# Patient Record
Sex: FEMALE | Race: WHITE | NOT HISPANIC OR LATINO | Employment: FULL TIME | URBAN - NONMETROPOLITAN AREA
[De-identification: names, ages, dates, MRNs, and addresses within clinical notes are randomized per-mention and may not be internally consistent; named-entity substitution may affect disease eponyms.]

---

## 2017-10-05 ENCOUNTER — APPOINTMENT (OUTPATIENT)
Dept: CARDIOLOGY | Facility: HOSPITAL | Age: 37
End: 2017-10-05
Attending: INTERNAL MEDICINE

## 2017-10-05 ENCOUNTER — HOSPITAL ENCOUNTER (INPATIENT)
Facility: HOSPITAL | Age: 37
LOS: 2 days | Discharge: HOME | End: 2017-10-07
Attending: INTERNAL MEDICINE | Admitting: INTERNAL MEDICINE

## 2017-10-05 PROBLEM — I21.3 STEMI (ST ELEVATION MYOCARDIAL INFARCTION): Status: ACTIVE | Noted: 2017-10-05

## 2017-10-05 LAB
ACT BLD: 224 SECONDS (ref 82–152)
BH CV ECHO MEAS - AO MAX PG (FULL): 2 MMHG
BH CV ECHO MEAS - AO MAX PG: 6.4 MMHG
BH CV ECHO MEAS - AO MEAN PG (FULL): 1 MMHG
BH CV ECHO MEAS - AO MEAN PG: 3 MMHG
BH CV ECHO MEAS - AO ROOT AREA (BSA CORRECTED): 1.4
BH CV ECHO MEAS - AO ROOT AREA: 5.7 CM^2
BH CV ECHO MEAS - AO ROOT DIAM: 2.7 CM
BH CV ECHO MEAS - AO V2 MAX: 126 CM/SEC
BH CV ECHO MEAS - AO V2 MEAN: 70.3 CM/SEC
BH CV ECHO MEAS - AO V2 VTI: 29 CM
BH CV ECHO MEAS - AVA(I,A): 2.9 CM^2
BH CV ECHO MEAS - AVA(I,D): 2.9 CM^2
BH CV ECHO MEAS - AVA(V,A): 2.9 CM^2
BH CV ECHO MEAS - AVA(V,D): 2.9 CM^2
BH CV ECHO MEAS - BSA(HAYCOCK): 2.1 M^2
BH CV ECHO MEAS - BSA: 2 M^2
BH CV ECHO MEAS - BZI_BMI: 33.3 KILOGRAMS/M^2
BH CV ECHO MEAS - BZI_METRIC_HEIGHT: 165.1 CM
BH CV ECHO MEAS - BZI_METRIC_WEIGHT: 90.7 KG
BH CV ECHO MEAS - CONTRAST EF 4CH: 67.5 ML/M^2
BH CV ECHO MEAS - EDV(CUBED): 161 ML
BH CV ECHO MEAS - EDV(MOD-SP4): 139 ML
BH CV ECHO MEAS - EDV(TEICH): 143.7 ML
BH CV ECHO MEAS - EF(CUBED): 74.3 %
BH CV ECHO MEAS - EF(TEICH): 65.6 %
BH CV ECHO MEAS - ESV(CUBED): 41.4 ML
BH CV ECHO MEAS - ESV(MOD-SP4): 45.2 ML
BH CV ECHO MEAS - ESV(TEICH): 49.5 ML
BH CV ECHO MEAS - FS: 36.4 %
BH CV ECHO MEAS - IVS/LVPW: 1
BH CV ECHO MEAS - IVSD: 1.1 CM
BH CV ECHO MEAS - LA DIMENSION: 3.5 CM
BH CV ECHO MEAS - LA/AO: 1.3
BH CV ECHO MEAS - LAT PEAK E' VEL: 9.3 CM/SEC
BH CV ECHO MEAS - LV DIASTOLIC VOL/BSA (35-75): 70.3 ML/M^2
BH CV ECHO MEAS - LV MASS(C)D: 233.3 GRAMS
BH CV ECHO MEAS - LV MASS(C)DI: 118 GRAMS/M^2
BH CV ECHO MEAS - LV MAX PG: 4.3 MMHG
BH CV ECHO MEAS - LV MEAN PG: 2 MMHG
BH CV ECHO MEAS - LV SYSTOLIC VOL/BSA (12-30): 22.8 ML/M^2
BH CV ECHO MEAS - LV V1 MAX: 104 CM/SEC
BH CV ECHO MEAS - LV V1 MEAN: 57.4 CM/SEC
BH CV ECHO MEAS - LV V1 VTI: 24.4 CM
BH CV ECHO MEAS - LVIDD: 5.4 CM
BH CV ECHO MEAS - LVIDS: 3.5 CM
BH CV ECHO MEAS - LVLD AP4: 8 CM
BH CV ECHO MEAS - LVLS AP4: 7 CM
BH CV ECHO MEAS - LVOT AREA (M): 3.5 CM^2
BH CV ECHO MEAS - LVOT AREA: 3.5 CM^2
BH CV ECHO MEAS - LVOT DIAM: 2.1 CM
BH CV ECHO MEAS - LVPWD: 1.1 CM
BH CV ECHO MEAS - MED PEAK E' VEL: 8.38 CM/SEC
BH CV ECHO MEAS - MV A MAX VEL: 76.2 CM/SEC
BH CV ECHO MEAS - MV DEC TIME: 0.23 SEC
BH CV ECHO MEAS - MV E MAX VEL: 91.2 CM/SEC
BH CV ECHO MEAS - MV E/A: 1.2
BH CV ECHO MEAS - RAP SYSTOLE: 5 MMHG
BH CV ECHO MEAS - RVDD: 4.5 CM
BH CV ECHO MEAS - RVSP: 21.8 MMHG
BH CV ECHO MEAS - SI(AO): 83.9 ML/M^2
BH CV ECHO MEAS - SI(CUBED): 60.4 ML/M^2
BH CV ECHO MEAS - SI(LVOT): 42.7 ML/M^2
BH CV ECHO MEAS - SI(MOD-SP4): 47.4 ML/M^2
BH CV ECHO MEAS - SI(TEICH): 47.7 ML/M^2
BH CV ECHO MEAS - SV(AO): 166 ML
BH CV ECHO MEAS - SV(CUBED): 119.6 ML
BH CV ECHO MEAS - SV(LVOT): 84.5 ML
BH CV ECHO MEAS - SV(MOD-SP4): 93.8 ML
BH CV ECHO MEAS - SV(TEICH): 94.3 ML
BH CV ECHO MEAS - TR MAX VEL: 205 CM/SEC
E/E' RATIO: 10.9
LEFT ATRIUM VOLUME INDEX: 17.2 ML/M2
LEFT ATRIUM VOLUME: 34 CM3

## 2017-10-05 PROCEDURE — 25010000002 MIDAZOLAM PER 1 MG: Performed by: INTERNAL MEDICINE

## 2017-10-05 PROCEDURE — 93010 ELECTROCARDIOGRAM REPORT: CPT | Performed by: INTERNAL MEDICINE

## 2017-10-05 PROCEDURE — 4A023N7 MEASUREMENT OF CARDIAC SAMPLING AND PRESSURE, LEFT HEART, PERCUTANEOUS APPROACH: ICD-10-PCS | Performed by: INTERNAL MEDICINE

## 2017-10-05 PROCEDURE — 99152 MOD SED SAME PHYS/QHP 5/>YRS: CPT | Performed by: INTERNAL MEDICINE

## 2017-10-05 PROCEDURE — 93005 ELECTROCARDIOGRAM TRACING: CPT | Performed by: INTERNAL MEDICINE

## 2017-10-05 PROCEDURE — 0 IOPAMIDOL PER 1 ML: Performed by: INTERNAL MEDICINE

## 2017-10-05 PROCEDURE — 93306 TTE W/DOPPLER COMPLETE: CPT

## 2017-10-05 PROCEDURE — C1725 CATH, TRANSLUMIN NON-LASER: HCPCS | Performed by: INTERNAL MEDICINE

## 2017-10-05 PROCEDURE — C9606 PERC D-E COR REVASC W AMI S: HCPCS | Performed by: INTERNAL MEDICINE

## 2017-10-05 PROCEDURE — 027034Z DILATION OF CORONARY ARTERY, ONE ARTERY WITH DRUG-ELUTING INTRALUMINAL DEVICE, PERCUTANEOUS APPROACH: ICD-10-PCS | Performed by: INTERNAL MEDICINE

## 2017-10-05 PROCEDURE — 99223 1ST HOSP IP/OBS HIGH 75: CPT | Performed by: INTERNAL MEDICINE

## 2017-10-05 PROCEDURE — 25010000002 HEPARIN (PORCINE) PER 1000 UNITS: Performed by: INTERNAL MEDICINE

## 2017-10-05 PROCEDURE — 25010000002 FENTANYL CITRATE (PF) 100 MCG/2ML SOLUTION: Performed by: INTERNAL MEDICINE

## 2017-10-05 PROCEDURE — 93454 CORONARY ARTERY ANGIO S&I: CPT | Performed by: INTERNAL MEDICINE

## 2017-10-05 PROCEDURE — 92941 PRQ TRLML REVSC TOT OCCL AMI: CPT | Performed by: INTERNAL MEDICINE

## 2017-10-05 PROCEDURE — C1769 GUIDE WIRE: HCPCS | Performed by: INTERNAL MEDICINE

## 2017-10-05 PROCEDURE — B2151ZZ FLUOROSCOPY OF LEFT HEART USING LOW OSMOLAR CONTRAST: ICD-10-PCS | Performed by: INTERNAL MEDICINE

## 2017-10-05 PROCEDURE — 85347 COAGULATION TIME ACTIVATED: CPT

## 2017-10-05 PROCEDURE — 25010000002 DIPHENHYDRAMINE PER 50 MG: Performed by: INTERNAL MEDICINE

## 2017-10-05 PROCEDURE — C1894 INTRO/SHEATH, NON-LASER: HCPCS | Performed by: INTERNAL MEDICINE

## 2017-10-05 PROCEDURE — C1887 CATHETER, GUIDING: HCPCS | Performed by: INTERNAL MEDICINE

## 2017-10-05 PROCEDURE — C1874 STENT, COATED/COV W/DEL SYS: HCPCS | Performed by: INTERNAL MEDICINE

## 2017-10-05 PROCEDURE — B2111ZZ FLUOROSCOPY OF MULTIPLE CORONARY ARTERIES USING LOW OSMOLAR CONTRAST: ICD-10-PCS | Performed by: INTERNAL MEDICINE

## 2017-10-05 PROCEDURE — 99201: CPT

## 2017-10-05 PROCEDURE — 25010000002 PERFLUTREN 6.52 MG/ML SUSPENSION: Performed by: INTERNAL MEDICINE

## 2017-10-05 PROCEDURE — 93306 TTE W/DOPPLER COMPLETE: CPT | Performed by: INTERNAL MEDICINE

## 2017-10-05 DEVICE — XIENCE ALPINE EVEROLIMUS ELUTING CORONARY STENT SYSTEM 3.50 MM X 23 MM / RAPID-EXCHANGE
Type: IMPLANTABLE DEVICE | Status: FUNCTIONAL
Brand: XIENCE ALPINE

## 2017-10-05 RX ORDER — HEPARIN SODIUM 1000 [USP'U]/ML
INJECTION, SOLUTION INTRAVENOUS; SUBCUTANEOUS AS NEEDED
Status: DISCONTINUED | OUTPATIENT
Start: 2017-10-05 | End: 2017-10-05 | Stop reason: HOSPADM

## 2017-10-05 RX ORDER — NICOTINE 21 MG/24HR
1 PATCH, TRANSDERMAL 24 HOURS TRANSDERMAL EVERY 24 HOURS
Status: DISCONTINUED | OUTPATIENT
Start: 2017-10-05 | End: 2017-10-07 | Stop reason: HOSPADM

## 2017-10-05 RX ORDER — ATORVASTATIN CALCIUM 40 MG/1
80 TABLET, FILM COATED ORAL NIGHTLY
Status: DISCONTINUED | OUTPATIENT
Start: 2017-10-05 | End: 2017-10-07 | Stop reason: HOSPADM

## 2017-10-05 RX ORDER — NITROGLYCERIN 5 MG/ML
INJECTION, SOLUTION INTRAVENOUS AS NEEDED
Status: DISCONTINUED | OUTPATIENT
Start: 2017-10-05 | End: 2017-10-05 | Stop reason: HOSPADM

## 2017-10-05 RX ORDER — ASPIRIN 81 MG/1
81 TABLET ORAL DAILY
Status: DISCONTINUED | OUTPATIENT
Start: 2017-10-05 | End: 2017-10-07 | Stop reason: HOSPADM

## 2017-10-05 RX ORDER — SODIUM CHLORIDE 0.9 % (FLUSH) 0.9 %
1-10 SYRINGE (ML) INJECTION AS NEEDED
Status: DISCONTINUED | OUTPATIENT
Start: 2017-10-05 | End: 2017-10-07 | Stop reason: HOSPADM

## 2017-10-05 RX ORDER — DIPHENHYDRAMINE HYDROCHLORIDE 50 MG/ML
INJECTION INTRAMUSCULAR; INTRAVENOUS AS NEEDED
Status: DISCONTINUED | OUTPATIENT
Start: 2017-10-05 | End: 2017-10-05 | Stop reason: HOSPADM

## 2017-10-05 RX ORDER — ACETAMINOPHEN 325 MG/1
650 TABLET ORAL EVERY 4 HOURS PRN
Status: DISCONTINUED | OUTPATIENT
Start: 2017-10-05 | End: 2017-10-05 | Stop reason: SDUPTHER

## 2017-10-05 RX ORDER — SODIUM CHLORIDE 9 MG/ML
100 INJECTION, SOLUTION INTRAVENOUS CONTINUOUS
Status: DISCONTINUED | OUTPATIENT
Start: 2017-10-05 | End: 2017-10-07 | Stop reason: HOSPADM

## 2017-10-05 RX ORDER — ONDANSETRON 2 MG/ML
4 INJECTION INTRAMUSCULAR; INTRAVENOUS EVERY 6 HOURS PRN
Status: DISCONTINUED | OUTPATIENT
Start: 2017-10-05 | End: 2017-10-07 | Stop reason: HOSPADM

## 2017-10-05 RX ORDER — LIDOCAINE HYDROCHLORIDE 20 MG/ML
INJECTION, SOLUTION INFILTRATION; PERINEURAL AS NEEDED
Status: DISCONTINUED | OUTPATIENT
Start: 2017-10-05 | End: 2017-10-05 | Stop reason: HOSPADM

## 2017-10-05 RX ORDER — MIDAZOLAM HYDROCHLORIDE 1 MG/ML
INJECTION INTRAMUSCULAR; INTRAVENOUS AS NEEDED
Status: DISCONTINUED | OUTPATIENT
Start: 2017-10-05 | End: 2017-10-05 | Stop reason: HOSPADM

## 2017-10-05 RX ORDER — NITROGLYCERIN 20 MG/100ML
INJECTION INTRAVENOUS CONTINUOUS PRN
Status: DISCONTINUED | OUTPATIENT
Start: 2017-10-05 | End: 2017-10-05 | Stop reason: HOSPADM

## 2017-10-05 RX ORDER — NITROGLYCERIN 0.4 MG/1
0.4 TABLET SUBLINGUAL
Status: DISCONTINUED | OUTPATIENT
Start: 2017-10-05 | End: 2017-10-07 | Stop reason: HOSPADM

## 2017-10-05 RX ORDER — ACETAMINOPHEN 325 MG/1
650 TABLET ORAL EVERY 4 HOURS PRN
Status: DISCONTINUED | OUTPATIENT
Start: 2017-10-05 | End: 2017-10-07 | Stop reason: HOSPADM

## 2017-10-05 RX ORDER — FENTANYL CITRATE 50 UG/ML
INJECTION, SOLUTION INTRAMUSCULAR; INTRAVENOUS AS NEEDED
Status: DISCONTINUED | OUTPATIENT
Start: 2017-10-05 | End: 2017-10-05 | Stop reason: HOSPADM

## 2017-10-05 RX ADMIN — SODIUM CHLORIDE 100 ML/HR: 9 INJECTION, SOLUTION INTRAVENOUS at 12:51

## 2017-10-05 RX ADMIN — PERFLUTREN 8.48 MG: 6.52 INJECTION, SUSPENSION INTRAVENOUS at 14:04

## 2017-10-05 RX ADMIN — ASPIRIN 81 MG: 81 TABLET ORAL at 12:49

## 2017-10-05 RX ADMIN — TICAGRELOR 90 MG: 90 TABLET ORAL at 20:16

## 2017-10-05 RX ADMIN — METOPROLOL TARTRATE 25 MG: 25 TABLET, FILM COATED ORAL at 20:16

## 2017-10-05 RX ADMIN — ATORVASTATIN CALCIUM 80 MG: 40 TABLET, FILM COATED ORAL at 20:16

## 2017-10-05 RX ADMIN — METOPROLOL TARTRATE 25 MG: 25 TABLET, FILM COATED ORAL at 12:49

## 2017-10-05 RX ADMIN — NICOTINE 1 PATCH: 21 PATCH, EXTENDED RELEASE TRANSDERMAL at 12:49

## 2017-10-06 LAB
ANION GAP SERPL CALCULATED.3IONS-SCNC: 11 MMOL/L (ref 4–13)
ARTICHOKE IGE QN: 202 MG/DL (ref 0–99)
BUN BLD-MCNC: 8 MG/DL (ref 5–21)
BUN/CREAT SERPL: 11.8 (ref 7–25)
CALCIUM SPEC-SCNC: 9 MG/DL (ref 8.4–10.4)
CHLORIDE SERPL-SCNC: 104 MMOL/L (ref 98–110)
CHOLEST SERPL-MCNC: 248 MG/DL (ref 130–200)
CO2 SERPL-SCNC: 25 MMOL/L (ref 24–31)
CREAT BLD-MCNC: 0.68 MG/DL (ref 0.5–1.4)
DEPRECATED RDW RBC AUTO: 44.8 FL (ref 40–54)
ERYTHROCYTE [DISTWIDTH] IN BLOOD BY AUTOMATED COUNT: 13.6 % (ref 12–15)
GFR SERPL CREATININE-BSD FRML MDRD: 97 ML/MIN/1.73
GLUCOSE BLD-MCNC: 91 MG/DL (ref 70–100)
HBA1C MFR BLD: 5.1 %
HCT VFR BLD AUTO: 43.6 % (ref 37–47)
HDLC SERPL-MCNC: 27 MG/DL
HGB BLD-MCNC: 15.2 G/DL (ref 12–16)
LDLC/HDLC SERPL: 6.16 {RATIO}
MAGNESIUM SERPL-MCNC: 1.8 MG/DL (ref 1.4–2.2)
MCH RBC QN AUTO: 31.7 PG (ref 28–32)
MCHC RBC AUTO-ENTMCNC: 34.9 G/DL (ref 33–36)
MCV RBC AUTO: 90.8 FL (ref 82–98)
PLATELET # BLD AUTO: 281 10*3/MM3 (ref 130–400)
PMV BLD AUTO: 9.7 FL (ref 6–12)
POTASSIUM BLD-SCNC: 3.7 MMOL/L (ref 3.5–5.3)
RBC # BLD AUTO: 4.8 10*6/MM3 (ref 4.2–5.4)
SODIUM BLD-SCNC: 140 MMOL/L (ref 135–145)
TRIGL SERPL-MCNC: 273 MG/DL (ref 0–149)
WBC NRBC COR # BLD: 17.7 10*3/MM3 (ref 4.8–10.8)

## 2017-10-06 PROCEDURE — 94799 UNLISTED PULMONARY SVC/PX: CPT

## 2017-10-06 PROCEDURE — 85027 COMPLETE CBC AUTOMATED: CPT | Performed by: INTERNAL MEDICINE

## 2017-10-06 PROCEDURE — 83735 ASSAY OF MAGNESIUM: CPT | Performed by: INTERNAL MEDICINE

## 2017-10-06 PROCEDURE — 99232 SBSQ HOSP IP/OBS MODERATE 35: CPT | Performed by: INTERNAL MEDICINE

## 2017-10-06 PROCEDURE — 83036 HEMOGLOBIN GLYCOSYLATED A1C: CPT | Performed by: INTERNAL MEDICINE

## 2017-10-06 PROCEDURE — 80061 LIPID PANEL: CPT | Performed by: INTERNAL MEDICINE

## 2017-10-06 PROCEDURE — 93010 ELECTROCARDIOGRAM REPORT: CPT | Performed by: INTERNAL MEDICINE

## 2017-10-06 PROCEDURE — 25010000002 ENOXAPARIN PER 10 MG: Performed by: INTERNAL MEDICINE

## 2017-10-06 PROCEDURE — 93005 ELECTROCARDIOGRAM TRACING: CPT | Performed by: INTERNAL MEDICINE

## 2017-10-06 PROCEDURE — 80048 BASIC METABOLIC PNL TOTAL CA: CPT | Performed by: INTERNAL MEDICINE

## 2017-10-06 RX ADMIN — ATORVASTATIN CALCIUM 80 MG: 40 TABLET, FILM COATED ORAL at 21:19

## 2017-10-06 RX ADMIN — NICOTINE 1 PATCH: 21 PATCH, EXTENDED RELEASE TRANSDERMAL at 12:26

## 2017-10-06 RX ADMIN — METOPROLOL TARTRATE 25 MG: 25 TABLET, FILM COATED ORAL at 21:19

## 2017-10-06 RX ADMIN — TICAGRELOR 90 MG: 90 TABLET ORAL at 08:14

## 2017-10-06 RX ADMIN — ASPIRIN 81 MG: 81 TABLET ORAL at 08:14

## 2017-10-06 RX ADMIN — TICAGRELOR 90 MG: 90 TABLET ORAL at 21:19

## 2017-10-06 RX ADMIN — METOPROLOL TARTRATE 25 MG: 25 TABLET, FILM COATED ORAL at 08:14

## 2017-10-06 RX ADMIN — ENOXAPARIN SODIUM 40 MG: 40 INJECTION SUBCUTANEOUS at 08:14

## 2017-10-07 VITALS
BODY MASS INDEX: 33.22 KG/M2 | SYSTOLIC BLOOD PRESSURE: 121 MMHG | TEMPERATURE: 97 F | HEIGHT: 65 IN | OXYGEN SATURATION: 98 % | HEART RATE: 78 BPM | RESPIRATION RATE: 18 BRPM | DIASTOLIC BLOOD PRESSURE: 84 MMHG | WEIGHT: 199.38 LBS

## 2017-10-07 PROCEDURE — 25010000002 ENOXAPARIN PER 10 MG: Performed by: INTERNAL MEDICINE

## 2017-10-07 PROCEDURE — 99239 HOSP IP/OBS DSCHRG MGMT >30: CPT | Performed by: INTERNAL MEDICINE

## 2017-10-07 RX ORDER — ASPIRIN 81 MG/1
81 TABLET ORAL DAILY
Qty: 30 TABLET | Refills: 11
Start: 2017-10-08

## 2017-10-07 RX ORDER — ATORVASTATIN CALCIUM 80 MG/1
80 TABLET, FILM COATED ORAL NIGHTLY
Qty: 30 TABLET | Refills: 11 | Status: SHIPPED | OUTPATIENT
Start: 2017-10-07 | End: 2018-10-01 | Stop reason: SDUPTHER

## 2017-10-07 RX ORDER — NITROGLYCERIN 0.4 MG/1
0.4 TABLET SUBLINGUAL
Qty: 30 TABLET | Refills: 12 | Status: SHIPPED | OUTPATIENT
Start: 2017-10-07 | End: 2021-05-19 | Stop reason: SDUPTHER

## 2017-10-07 RX ADMIN — ENOXAPARIN SODIUM 40 MG: 40 INJECTION SUBCUTANEOUS at 08:20

## 2017-10-07 RX ADMIN — TICAGRELOR 90 MG: 90 TABLET ORAL at 08:20

## 2017-10-07 RX ADMIN — ASPIRIN 81 MG: 81 TABLET ORAL at 08:20

## 2017-10-07 RX ADMIN — METOPROLOL TARTRATE 25 MG: 25 TABLET, FILM COATED ORAL at 10:03

## 2017-10-11 ENCOUNTER — TELEPHONE (OUTPATIENT)
Dept: CARDIAC REHAB | Facility: HOSPITAL | Age: 37
End: 2017-10-11

## 2017-11-09 ENCOUNTER — OFFICE VISIT (OUTPATIENT)
Dept: CARDIOLOGY | Facility: CLINIC | Age: 37
End: 2017-11-09

## 2017-11-09 VITALS
BODY MASS INDEX: 33.82 KG/M2 | HEART RATE: 82 BPM | DIASTOLIC BLOOD PRESSURE: 60 MMHG | WEIGHT: 203 LBS | HEIGHT: 65 IN | SYSTOLIC BLOOD PRESSURE: 110 MMHG | OXYGEN SATURATION: 99 %

## 2017-11-09 DIAGNOSIS — E78.2 MIXED HYPERLIPIDEMIA: ICD-10-CM

## 2017-11-09 DIAGNOSIS — Z72.0 TOBACCO ABUSE: ICD-10-CM

## 2017-11-09 DIAGNOSIS — I25.10 CORONARY ARTERY DISEASE INVOLVING NATIVE CORONARY ARTERY OF NATIVE HEART WITHOUT ANGINA PECTORIS: Primary | ICD-10-CM

## 2017-11-09 PROBLEM — I21.3 STEMI (ST ELEVATION MYOCARDIAL INFARCTION) (HCC): Status: RESOLVED | Noted: 2017-10-05 | Resolved: 2017-11-09

## 2017-11-09 PROBLEM — E78.5 HYPERLIPIDEMIA: Status: ACTIVE | Noted: 2017-11-09

## 2017-11-09 PROCEDURE — 99214 OFFICE O/P EST MOD 30 MIN: CPT | Performed by: INTERNAL MEDICINE

## 2017-11-09 PROCEDURE — 93000 ELECTROCARDIOGRAM COMPLETE: CPT | Performed by: INTERNAL MEDICINE

## 2017-11-09 NOTE — PROGRESS NOTES
Subjective:     Encounter Date:11/09/2017      Patient ID: Leidy Lerma is a 37 y.o. female with coronary artery disease, status pot recent RCA PCI at time of STEMI, hyperlipidemia, tobacco abuse, here for follow-up.    Chief Complaint: Hospital follow-up    Coronary Artery Disease   Presents for follow-up visit. Pertinent negatives include no chest pain, chest pressure, chest tightness, dizziness, leg swelling, muscle weakness, palpitations, shortness of breath or weight gain. Risk factors include hyperlipidemia and obesity. The symptoms have been stable. Compliance with diet is good. Compliance with exercise is good. Compliance with medications is good.   Hyperlipidemia   This is a new (newly diagnosed at hospital visit) problem. The current episode started more than 1 month ago. The problem is uncontrolled. Recent lipid tests were reviewed and are high. Exacerbating diseases include obesity. Pertinent negatives include no chest pain, focal sensory loss, focal weakness, leg pain, myalgias or shortness of breath. Current antihyperlipidemic treatment includes statins. There are no compliance problems.  Risk factors for coronary artery disease include dyslipidemia and obesity.   Nicotine Dependence   Presents for follow-up visit. Symptoms are negative for sore throat. Her urge triggers include company of smokers. The symptoms have been stable. She smokes < 1/2 a pack of cigarettes per day. Compliance with prior treatments has been variable.     The patient has been doing well since being out of the hospital.  No recurrent chest pain, and she is participating in cardiac rehab.  No significant SOB, ARMENTA.  No orthopnea, PND, edema.  She has cut back on both tobacco and alcohol but is still smoking a few cigarettes per day.  She has also noted that, due to her decrease in tobacco, she has noted some anxiety.  No problems with medications.    Current Outpatient Prescriptions:   •  aspirin 81 MG EC tablet, Take 1  tablet by mouth Daily., Disp: 30 tablet, Rfl: 11  •  atorvastatin (LIPITOR) 80 MG tablet, Take 1 tablet by mouth Every Night., Disp: 30 tablet, Rfl: 11  •  metoprolol tartrate (LOPRESSOR) 25 MG tablet, Take 1 tablet by mouth Every 12 (Twelve) Hours., Disp: 60 tablet, Rfl: 11  •  nitroglycerin (NITROSTAT) 0.4 MG SL tablet, Place 1 tablet under the tongue Every 5 (Five) Minutes As Needed for Chest Pain. Take no more than 3 doses in 15 minutes., Disp: 30 tablet, Rfl: 12  •  ticagrelor (BRILINTA) 90 MG tablet tablet, Take 1 tablet by mouth Every 12 (Twelve) Hours., Disp: 60 tablet, Rfl: 11    Allergies   Allergen Reactions   • Penicillins Hives     Social History   Substance Use Topics   • Smoking status: Current Every Day Smoker     Packs/day: 1.00     Types: Cigarettes   • Smokeless tobacco: Never Used   • Alcohol use Yes      Comment: 1-2 drinks a week of liquor     Review of Systems   Constitution: Positive for malaise/fatigue. Negative for chills, fever, weakness, night sweats, weight gain and weight loss.   HENT: Negative for congestion and sore throat.    Cardiovascular: Negative for chest pain, claudication, dyspnea on exertion, irregular heartbeat, leg swelling, orthopnea, palpitations, paroxysmal nocturnal dyspnea and syncope.   Respiratory: Negative for chest tightness, cough, hemoptysis, shortness of breath and wheezing.    Endocrine: Negative for cold intolerance, heat intolerance, polydipsia and polyuria.   Hematologic/Lymphatic: Negative for bleeding problem. Does not bruise/bleed easily.   Musculoskeletal: Negative for muscle weakness and myalgias.   Gastrointestinal: Negative for abdominal pain, hematemesis, hematochezia, melena, nausea and vomiting.   Genitourinary: Negative for bladder incontinence, dysuria and hematuria.   Neurological: Negative for dizziness, focal weakness, headaches, loss of balance, numbness, paresthesias and seizures.   Psychiatric/Behavioral: The patient is nervous/anxious.   "      ECG 12 Lead  Date/Time: 11/9/2017 11:22 AM  Performed by: YAHAIRA JULIO  Authorized by: YAHAIRA JULIO   Comparison: compared with previous ECG from 10/6/2017  Similar to previous ECG  Rhythm: sinus rhythm  Rate: normal  BPM: 77  Conduction: conduction normal  QRS axis: normal  Clinical impression: abnormal ECG  Comments: NSTT inferior             Objective:     Physical Exam   Constitutional: She is oriented to person, place, and time. She appears well-developed and well-nourished. No distress.   HENT:   Head: Normocephalic and atraumatic.   Mouth/Throat: Oropharynx is clear and moist.   Eyes: EOM are normal. Pupils are equal, round, and reactive to light.   Neck: Normal range of motion. Neck supple. No JVD present. No thyromegaly present.   Cardiovascular: Normal rate, regular rhythm, S1 normal, S2 normal, normal heart sounds and intact distal pulses.  Exam reveals no gallop and no friction rub.    No murmur heard.  Pulmonary/Chest: Effort normal and breath sounds normal.   Abdominal: Soft. Bowel sounds are normal. She exhibits no distension. There is no tenderness.   Musculoskeletal: Normal range of motion. She exhibits no edema.   Neurological: She is alert and oriented to person, place, and time. No cranial nerve deficit.   Skin: Skin is warm and dry. No rash noted. No cyanosis or erythema. Nails show no clubbing.   Psychiatric: She has a normal mood and affect.   Vitals reviewed.    /60 (BP Location: Left arm, Patient Position: Sitting)  Pulse 82  Ht 65\" (165.1 cm)  Wt 203 lb (92.1 kg)  SpO2 99%  BMI 33.78 kg/m2    Data/Lab Review:     Echocardiogram on 10/5/17:  · Left ventricular systolic function is normal. Estimated EF appears to be in the range of 56 - 60%.  · The following left ventricular wall segments are hypokinetic: basal inferior.  · Left ventricular wall thickness is consistent with borderline concentric hypertrophy.  · Trace tricuspid valve regurgitation is " present. Estimated right ventricular systolic pressure from tricuspid regurgitation is normal (<35 mmHg).    Cardiac Cath 10/5/2017:    Selective Coronary Angiography:     Left Main Coronary Artery: The left main coronary artery arises from the left coronary cusp and bifurcates into the LAD and left circumflex arteries. Luminal irregularities are present, but no significant disease.      Left Anterior Descending Artery: The left anterior descending artery arises from the distal left main coronary artery and supplies one significant diagonal branch and a second very small diagonal branch and the left anterior descending artery terminates by wrapping the apex.  There is mild disease throughout the course of the left anterior descending artery with irregularities up to 30% or so.  The diagonal branch also has mild to moderate disease but no significant obstructive disease identified throughout the LAD of the diagonal branches..       Left Circumflex: The left circumflex artery arises from the distal left main artery and supplies essentially 2 obtuse marginals.  The first obtuse marginal has mild diffuse disease throughout the course the vessel.  The second obtuse marginal essentially terminates as 2 terminal branches each of which have mild irregularities.  The circumflex has no significant obstructive disease but irregularities up to 30%.      Right Coronary Artery: The right coronary artery arises from the right coronary cusp and is dominant for the posterior circulation.  On the initial angiograms, the right coronary artery is open but does have a subtotal occlusion in the midportion the vessel just at the takeoff of a right ventricular marginal branch.  The proximal portion the vessels diffusely diseased and then there is an ectatic portion just proximal to the subtotally occluded segment.  After this, there are 2 right ventricular marginal branches present.  Distally, the right coronary artery provides flow to a  "small posterior descending artery multiple posterolateral branches.  The entire vessel has diffuse disease throughout its course.      Percutaneous Coronary Intervention to the mid right coronary artery:      Guide Catheter: 6 [place] JR4  Anticoagulation: Unfractionated heparin  Wire(s): 0.014\" Prowater     A 0.014 inch Prowater wire was used to cross the stenosis in the mid right coronary artery and advanced distally in a stable position.  The lesion was then dilated with a 2.0 x 12 mm balloon.  After this, the lesion was stented with a Xience 3.5 x 23 mm drug-eluting stent.  After this, the proximal portion was postdilated with a noncompliant 4.0 x 12 mm balloon.  Follow-up angiography revealed no residual stenosis, BRAN-3 flow down the right coronary artery and preserved flow in the right ventricular marginal branches.  There is no evidence of dissection, perforation, distal embolization.  The proximal portion the vessel remains diffusely diseased and was intentionally left untreated.  The result at the stent site is thought to be a suitable angiographic result.    Lipid Panel 10/6/2017:    Total Cholesterol 130 - 200 mg/dL 248 (H)   Triglycerides 0 - 149 mg/dL 273 (H)   HDL Cholesterol >=50 mg/dL 27 (L)   LDL Cholesterol  0 - 99 mg/dL 202 (H)         Assessment:          Diagnosis Plan   1. Coronary artery disease involving native coronary artery of native heart without angina pectoris  ECG 12 Lead   2. Mixed hyperlipidemia     3. Tobacco abuse          Plan:       1. Coronary artery disease:  Clinically stable at this time after recent hospitalization.  No problems with her medications.  Continue aspirin and Brilinta (both, uninterrupted, for 12 months).  Continue other medications as well.    2. Hyperlipidemia:  Will plan to see back in 6 months and repeat lipid panel at that time as her LDL, while  Not on statin therapy, was 202.  Continue high dose statin therapy.    3. Tobacco abuse:  Counseled for 5 " minutes today.  She knows that she needs to quit and has cut back and will continue efforts at complete cessation.    Follow-up: 6 months unless needed sooner.    EMR Dragon/Transcription disclaimer: Much of this encounter note is an electronic transcription/translation of spoken language to printed text. The electronic translation of spoken language may permit erroneous, or at times, nonsensical words or phrases to be inadvertently transcribed; although I have reviewed the note for such errors, some may still exist.

## 2018-05-09 ENCOUNTER — OFFICE VISIT (OUTPATIENT)
Dept: CARDIOLOGY | Facility: CLINIC | Age: 38
End: 2018-05-09

## 2018-05-09 VITALS
OXYGEN SATURATION: 99 % | WEIGHT: 215 LBS | HEIGHT: 65 IN | DIASTOLIC BLOOD PRESSURE: 70 MMHG | HEART RATE: 87 BPM | BODY MASS INDEX: 35.82 KG/M2 | SYSTOLIC BLOOD PRESSURE: 118 MMHG

## 2018-05-09 DIAGNOSIS — Z72.0 TOBACCO ABUSE: ICD-10-CM

## 2018-05-09 DIAGNOSIS — E78.2 MIXED HYPERLIPIDEMIA: ICD-10-CM

## 2018-05-09 DIAGNOSIS — I25.10 CORONARY ARTERY DISEASE INVOLVING NATIVE CORONARY ARTERY OF NATIVE HEART WITHOUT ANGINA PECTORIS: Primary | ICD-10-CM

## 2018-05-09 PROCEDURE — 99406 BEHAV CHNG SMOKING 3-10 MIN: CPT | Performed by: INTERNAL MEDICINE

## 2018-05-09 PROCEDURE — 99214 OFFICE O/P EST MOD 30 MIN: CPT | Performed by: INTERNAL MEDICINE

## 2018-05-09 PROCEDURE — 93000 ELECTROCARDIOGRAM COMPLETE: CPT | Performed by: INTERNAL MEDICINE

## 2018-05-09 RX ORDER — ERGOCALCIFEROL 1.25 MG/1
50000 CAPSULE ORAL WEEKLY
COMMUNITY
End: 2020-05-13

## 2018-05-09 NOTE — PROGRESS NOTES
Subjective:     Encounter Date:05/09/2018      Patient ID: Leidy Lerma is a 37 y.o. female with coronary artery disease, status pot recent RCA PCI at time of STEMI, hyperlipidemia, tobacco abuse, here for follow-up.    Chief Complaint: Follow-up    Coronary Artery Disease   Presents for follow-up visit. Pertinent negatives include no chest pain, chest pressure, chest tightness, dizziness, leg swelling, muscle weakness, palpitations, shortness of breath or weight gain. The symptoms have been stable. Compliance with diet is variable. Compliance with exercise is variable. Compliance with medications is good.   Nicotine Dependence   Presents for follow-up visit. Her urge triggers include company of smokers. The symptoms have been stable. She smokes 1 pack of cigarettes per day. Compliance with prior treatments has been variable.     This patient presents today for follow-up of coronary artery disease.  She says that she has done well since her last office visit with no recurrent chest pain.  Patient denies a shortness of breath or dyspnea on exertion.  The patient denies orthopnea, PND, edema, lightheadedness, dizziness, syncope.  The patient has not had any trouble with her medications.  She does continue to smoke and does that she needs to cut back.  She has cut back on her alcohol consumption.  Overall, the patient seems pleased without she is feeling at this time otherwise.    Current Outpatient Prescriptions:   •  aspirin 81 MG EC tablet, Take 1 tablet by mouth Daily., Disp: 30 tablet, Rfl: 11  •  atorvastatin (LIPITOR) 80 MG tablet, Take 1 tablet by mouth Every Night., Disp: 30 tablet, Rfl: 11  •  metoprolol tartrate (LOPRESSOR) 25 MG tablet, Take 1 tablet by mouth Every 12 (Twelve) Hours., Disp: 60 tablet, Rfl: 11  •  nitroglycerin (NITROSTAT) 0.4 MG SL tablet, Place 1 tablet under the tongue Every 5 (Five) Minutes As Needed for Chest Pain. Take no more than 3 doses in 15 minutes., Disp: 30 tablet, Rfl:  12  •  ticagrelor (BRILINTA) 90 MG tablet tablet, Take 1 tablet by mouth Every 12 (Twelve) Hours., Disp: 60 tablet, Rfl: 11  •  vitamin D (ERGOCALCIFEROL) 16229 units capsule capsule, Take 50,000 Units by mouth 1 (One) Time Per Week., Disp: , Rfl:     Allergies   Allergen Reactions   • Penicillins Hives     Social History   Substance Use Topics   • Smoking status: Current Every Day Smoker     Packs/day: 1.00     Types: Cigarettes   • Smokeless tobacco: Never Used   • Alcohol use Yes      Comment: 1-2 drinks a week of liquor     Review of Systems   Constitution: Negative for chills, fever, weakness, night sweats, weight gain and weight loss.   Cardiovascular: Negative for chest pain, claudication, dyspnea on exertion, irregular heartbeat, leg swelling, orthopnea, palpitations, paroxysmal nocturnal dyspnea and syncope.   Respiratory: Negative for chest tightness, cough, hemoptysis, shortness of breath and wheezing.    Hematologic/Lymphatic: Negative for bleeding problem. Does not bruise/bleed easily.   Musculoskeletal: Negative for muscle weakness.   Gastrointestinal: Negative for abdominal pain, hematemesis, hematochezia, melena, nausea and vomiting.   Genitourinary: Negative for dysuria and hematuria.   Neurological: Negative for dizziness, headaches, loss of balance and numbness.       ECG 12 Lead  Date/Time: 5/9/2018 10:48 AM  Performed by: YAHAIRA JULIO  Authorized by: YAHAIRA JULIO   Comparison: compared with previous ECG from 11/9/2017  Similar to previous ECG  Rhythm: sinus rhythm  Rate: normal  BPM: 81  Conduction: conduction normal  Clinical impression: abnormal ECG  Comments: NSTT inferior             Objective:     Physical Exam   Constitutional: She is oriented to person, place, and time. She appears well-developed and well-nourished. No distress.   HENT:   Head: Normocephalic and atraumatic.   Mouth/Throat: Oropharynx is clear and moist.   Eyes: EOM are normal. Pupils are equal, round,  "and reactive to light.   Neck: Normal range of motion. Neck supple. No JVD present. No thyromegaly present.   Cardiovascular: Normal rate, regular rhythm, S1 normal, S2 normal, normal heart sounds and intact distal pulses.  Exam reveals no gallop and no friction rub.    No murmur heard.  Pulmonary/Chest: Effort normal and breath sounds normal.   Abdominal: Soft. Bowel sounds are normal. She exhibits no distension. There is no tenderness.   Musculoskeletal: Normal range of motion. She exhibits no edema.   Neurological: She is alert and oriented to person, place, and time. No cranial nerve deficit.   Skin: Skin is warm and dry. No rash noted. No cyanosis or erythema. Nails show no clubbing.   Psychiatric: She has a normal mood and affect.   Vitals reviewed.    /70 (BP Location: Left arm, Patient Position: Sitting)   Pulse 87   Ht 165.1 cm (65\")   Wt 97.5 kg (215 lb)   SpO2 99%   BMI 35.78 kg/m²     Lab Review:     Reviewed the report of an echocardiogram from 10/5/17: Normal left ventricular ejection fraction with borderline concentric hypertrophy and no significant valvular abnormalities.    I once again reviewed the report of the cardiac catheterization from 10/5/17.  The patient underwent PCI to the mid right coronary artery at that time.  The remainder of her coronary arteries had no more than mild/moderate disease.      Assessment:          Diagnosis Plan   1. Coronary artery disease involving native coronary artery of native heart without angina pectoris  ECG 12 Lead   2. Mixed hyperlipidemia     3. Tobacco abuse            Plan:         1. Coronary artery disease:  The patient remains clinically stable and on dual antiplatelet therapy at this time.  She remains on Lipitor and metoprolol as well.     2. Hyperlipidemia:   patient states that she rather recently had a lipid bowel check by her primary care physician in [place].  She was told that her cholesterol was at goal.  She does remain on high " intensity statin therapy and is tolerating this well.     3. Tobacco abuse:  I advised the patient of the risks in continuing to use tobacco. During this visit, I spent 5 minutes counseling the patient regarding smoking cessation.     Follow-up: 6 months unless needed sooner.

## 2018-07-24 ENCOUNTER — OUTSIDE FACILITY SERVICE (OUTPATIENT)
Dept: CARDIOLOGY | Facility: CLINIC | Age: 38
End: 2018-07-24

## 2018-07-24 PROCEDURE — 99254 IP/OBS CNSLTJ NEW/EST MOD 60: CPT | Performed by: NURSE PRACTITIONER

## 2018-07-25 ENCOUNTER — OUTSIDE FACILITY SERVICE (OUTPATIENT)
Dept: CARDIOLOGY | Facility: CLINIC | Age: 38
End: 2018-07-25

## 2018-07-25 PROCEDURE — 99232 SBSQ HOSP IP/OBS MODERATE 35: CPT | Performed by: NURSE PRACTITIONER

## 2018-07-26 ENCOUNTER — OUTSIDE FACILITY SERVICE (OUTPATIENT)
Dept: CARDIOLOGY | Facility: CLINIC | Age: 38
End: 2018-07-26

## 2018-07-26 DIAGNOSIS — Z72.0 TOBACCO ABUSE: Primary | ICD-10-CM

## 2018-07-26 PROCEDURE — 99231 SBSQ HOSP IP/OBS SF/LOW 25: CPT | Performed by: NURSE PRACTITIONER

## 2018-07-26 RX ORDER — VARENICLINE TARTRATE 1 MG/1
1 TABLET, FILM COATED ORAL 2 TIMES DAILY
Qty: 60 TABLET | Refills: 4 | Status: SHIPPED | OUTPATIENT
Start: 2018-08-23 | End: 2021-05-19

## 2018-10-01 RX ORDER — ATORVASTATIN CALCIUM 80 MG/1
80 TABLET, FILM COATED ORAL NIGHTLY
Qty: 30 TABLET | Refills: 11 | Status: SHIPPED | OUTPATIENT
Start: 2018-10-01 | End: 2019-09-22 | Stop reason: SDUPTHER

## 2018-11-12 ENCOUNTER — OFFICE VISIT (OUTPATIENT)
Dept: CARDIOLOGY | Facility: CLINIC | Age: 38
End: 2018-11-12

## 2018-11-12 VITALS
SYSTOLIC BLOOD PRESSURE: 100 MMHG | DIASTOLIC BLOOD PRESSURE: 64 MMHG | WEIGHT: 209 LBS | OXYGEN SATURATION: 99 % | HEIGHT: 65 IN | BODY MASS INDEX: 34.82 KG/M2 | HEART RATE: 82 BPM

## 2018-11-12 DIAGNOSIS — Z72.0 TOBACCO ABUSE: ICD-10-CM

## 2018-11-12 DIAGNOSIS — I25.10 CORONARY ARTERY DISEASE INVOLVING NATIVE CORONARY ARTERY OF NATIVE HEART WITHOUT ANGINA PECTORIS: Primary | ICD-10-CM

## 2018-11-12 DIAGNOSIS — R07.89 ATYPICAL CHEST PAIN: ICD-10-CM

## 2018-11-12 DIAGNOSIS — E78.2 MIXED HYPERLIPIDEMIA: ICD-10-CM

## 2018-11-12 PROCEDURE — 99214 OFFICE O/P EST MOD 30 MIN: CPT | Performed by: INTERNAL MEDICINE

## 2018-11-12 PROCEDURE — 99406 BEHAV CHNG SMOKING 3-10 MIN: CPT | Performed by: INTERNAL MEDICINE

## 2018-11-13 PROBLEM — R07.89 ATYPICAL CHEST PAIN: Status: ACTIVE | Noted: 2018-11-13

## 2018-11-13 NOTE — PROGRESS NOTES
Subjective:     Encounter Date:11/12/2018      Patient ID: Leidy Lerma is a 38 y.o. female with coronary artery disease, status post PCI of the right coronary artery at the time of an inferior STEMI, hyperlipidemia, tobacco abuse who presents today for follow-up.    Chief Complaint: Follow-up    Coronary Artery Disease   Presents for follow-up visit. Symptoms include chest pain (rare episodes with increased stress at home). Pertinent negatives include no dizziness, leg swelling, muscle weakness, palpitations, shortness of breath or weight gain. The symptoms have been stable. Compliance with diet is variable. Compliance with exercise is variable. Compliance with medications is good.   Chest Pain    This is a new problem. The onset quality is sudden. The problem occurs rarely. The problem has been unchanged. The pain is present in the substernal region. The pain is at a severity of 2/10. The quality of the pain is described as sharp. The pain does not radiate. Pertinent negatives include no abdominal pain, claudication, cough, dizziness, exertional chest pressure, fever, headaches, hemoptysis, irregular heartbeat, lower extremity edema, malaise/fatigue, nausea, numbness, orthopnea, palpitations, PND, shortness of breath, syncope, vomiting or weakness. The pain is aggravated by emotional upset (stress, anxiety). She has tried nothing for the symptoms.   Her past medical history is significant for CAD.   Pertinent negatives for past medical history include no muscle weakness.     This patient presents today for follow-up of coronary artery disease.  The patient says that in terms of her coronary artery disease, she feels that she is stable.  She says that she will occasionally have some brief episodes of chest discomfort.  She has had multiple life stressors recently including her mother passed away suddenly along with her grandmother who she cared for passing away suddenly.  In addition, she has had some issues  with her ex- and her children.  All of this has created some increased stress and the patient has noted some mild chest discomfort at those times.  She says that these discomforts are sharp pains in the central portion of the chest that are fleeting, no associated symptoms, no modifying factors.  The patient has been reasonably active and has noticed no significant shortness of breath or dyspnea on exertion.  She has not had any trouble with her medications.  Her blood pressure has been well-controlled but she does not necessarily check her blood pressure very often.  She has continued to smoke but is trying to cut back considerably.  She knows that she needs to quit.  Otherwise, no recent problems.      Current Outpatient Medications:   •  aspirin 81 MG EC tablet, Take 1 tablet by mouth Daily., Disp: 30 tablet, Rfl: 11  •  atorvastatin (LIPITOR) 80 MG tablet, Take 1 tablet by mouth Every Night., Disp: 30 tablet, Rfl: 11  •  metoprolol tartrate (LOPRESSOR) 25 MG tablet, Take 1 tablet by mouth Every 12 (Twelve) Hours., Disp: 60 tablet, Rfl: 11  •  nitroglycerin (NITROSTAT) 0.4 MG SL tablet, Place 1 tablet under the tongue Every 5 (Five) Minutes As Needed for Chest Pain. Take no more than 3 doses in 15 minutes., Disp: 30 tablet, Rfl: 12  •  ticagrelor (BRILINTA) 90 MG tablet tablet, Take 1 tablet by mouth Every 12 (Twelve) Hours., Disp: 60 tablet, Rfl: 11  •  venlafaxine (EFFEXOR) 25 MG tablet, Take 25 mg by mouth Daily., Disp: , Rfl:   •  vitamin D (ERGOCALCIFEROL) 09057 units capsule capsule, Take 50,000 Units by mouth 1 (One) Time Per Week., Disp: , Rfl:   •  varenicline (CHANTIX CONTINUING MONTH OTIS) 1 MG tablet, Take 1 tablet by mouth 2 (Two) Times a Day., Disp: 60 tablet, Rfl: 4  •  varenicline (CHANTIX STARTING MONTH OTIS) 0.5 MG X 11 & 1 MG X 42 tablet, Take 0.5 mg one daily on days 1-3 and 0.5 mg twice daily on days 4-7. Then 1 mg twice daily for a total of 12 weeks., Disp: 42 tablet, Rfl:  0    Allergies   Allergen Reactions   • Penicillins Hives     Social History     Tobacco Use   • Smoking status: Current Every Day Smoker     Packs/day: 1.00     Types: Cigarettes   • Smokeless tobacco: Never Used   Substance Use Topics   • Alcohol use: Yes     Comment: 1-2 drinks a week of liquor     Review of Systems   Constitution: Negative for chills, fever, weakness, malaise/fatigue, night sweats, weight gain and weight loss.   Cardiovascular: Positive for chest pain (rare episodes with increased stress at home). Negative for claudication, dyspnea on exertion, irregular heartbeat, leg swelling, orthopnea, palpitations, paroxysmal nocturnal dyspnea and syncope.   Respiratory: Negative for cough, hemoptysis, shortness of breath and wheezing.    Endocrine: Negative for cold intolerance and heat intolerance.   Hematologic/Lymphatic: Negative for bleeding problem. Does not bruise/bleed easily.   Musculoskeletal: Negative for muscle weakness.   Gastrointestinal: Negative for abdominal pain, hematemesis, hematochezia, melena, nausea and vomiting.   Genitourinary: Negative for dysuria and hematuria.   Neurological: Negative for dizziness, headaches, loss of balance and numbness.         ECG 12 Lead  Date/Time: 11/14/2018 10:32 AM  Performed by: Sergo Aguillon MD  Authorized by: Sergo Aguillon MD   Comparison: compared with previous ECG from 5/9/2018  Similar to previous ECG  Rhythm: sinus rhythm  Rate: normal  BPM: 76  Conduction: conduction normal  ST Segments: ST segments normal  QRS axis: normal  Other findings: LAE  Clinical impression: non-specific ECG               Objective:     Physical Exam   Constitutional: She is oriented to person, place, and time. She appears well-developed and well-nourished. No distress.   HENT:   Head: Normocephalic and atraumatic.   Mouth/Throat: Oropharynx is clear and moist.   Eyes: EOM are normal. Pupils are equal, round, and reactive to light.   Neck: Normal range  "of motion. Neck supple. No JVD present. No thyromegaly present.   Cardiovascular: Normal rate, regular rhythm, S1 normal, S2 normal, normal heart sounds and intact distal pulses. Exam reveals no gallop and no friction rub.   No murmur heard.  Pulmonary/Chest: Effort normal and breath sounds normal.   Abdominal: Soft. Bowel sounds are normal. She exhibits no distension. There is no tenderness.   Musculoskeletal: Normal range of motion. She exhibits no edema.   Neurological: She is alert and oriented to person, place, and time. No cranial nerve deficit.   Skin: Skin is warm and dry. No rash noted. No cyanosis or erythema. Nails show no clubbing.   Psychiatric: She has a normal mood and affect.   Vitals reviewed.    /64 (BP Location: Left arm, Patient Position: Sitting, Cuff Size: Large Adult)   Pulse 82   Ht 165.1 cm (65\")   Wt 94.8 kg (209 lb)   SpO2 99%   BMI 34.78 kg/m²     Data/Lab Review:     Lab Results   Component Value Date    CHOL 248 (H) 10/06/2017    TRIG 273 (H) 10/06/2017    HDL 27 (L) 10/06/2017     (H) 10/06/2017     Cardiac Cath 10/17:    Selective Coronary Angiography:     Left Main Coronary Artery: The left main coronary artery arises from the left coronary cusp and bifurcates into the LAD and left circumflex arteries. Luminal irregularities are present, but no significant disease.     Left Anterior Descending Artery: The left anterior descending artery arises from the distal left main coronary artery and supplies one significant diagonal branch and a second very small diagonal branch and the left anterior descending artery terminates by wrapping the apex.  There is mild disease throughout the course of the left anterior descending artery with irregularities up to 30%or so.  The diagonal branch also has mild to moderate disease but no significant obstructive disease identified throughout the LAD of the diagonal branches..      Left Circumflex: The left circumflex artery arises from " "the distal left main artery and supplies essentially 2 obtuse marginals.  The first obtuse marginal has mild diffuse disease throughout the course the vessel.  The second obtuse marginal essentially terminates as 2 terminal branches each of which have mild irregularities.  The circumflex has no significant obstructive disease but irregularities up to 30%.     Right Coronary Artery: The right coronary artery arises from the right coronary cusp and is dominant for the posterior circulation.  On the initial angiograms, the right coronary artery is open but does have a subtotal occlusion in the midportion the vessel just at the takeoff of a right ventricular marginal branch.  The proximal portion the vessels diffusely diseased and then there is an ectatic portion just proximal to the subtotally occluded segment.  After this, there are 2 right ventricular marginal branches present.  Distally, the right coronary artery provides flow to a small posterior descending artery multiple posterolateral branches.  The entire vessel has diffuse disease throughout its course.     Percutaneous Coronary Intervention to the mid right coronary artery:      Guide Catheter: 6 Kazakh JR4  Anticoagulation: Unfractionated heparin  Wire(s): 0.014\" Prowater     A 0.014 inch Prowater wire was used to cross the stenosis in the mid right coronary artery and advanced distally in a stable position.  The lesion was then dilated with a 2.0 x 12 mm balloon.  After this, the lesion was stented with a Xience 3.5 x 23 mm drug-eluting stent.  After this, the proximal portion was postdilated with a noncompliant 4.0 x 12 mm balloon.  Follow-up angiography revealed no residual stenosis, BRAN-3 flow down the right coronary artery and preserved flow in the right ventricular marginal branches.  There is no evidence of dissection, perforation, distal embolization.  The proximal portion the vessel remains diffusely diseased and was intentionally left untreated.  " The result at the stent site is thought to be a suitable angiographic result.      Assessment:          Diagnosis Plan   1. Coronary artery disease involving native coronary artery of native heart without angina pectoris  ECG 12 Lead   2. Atypical chest pain     3. Mixed hyperlipidemia     4. Tobacco abuse            Plan:       1.  Coronary artery disease: I feel that the patient's likely clinically stable at this time.  She does remain on dual antiplatelet therapy.  Technically, the ticagrelor can stop at this time as the patient is greater than 12 months out from her PCI but she should continue aspirin 81 mg daily.  She also remains on statin therapy as well as beta blocker therapy.    2.  Atypical chest pain: Patient's chest discomfort is very atypical.  This seems to be related to stressful situations.  I have encouraged the patient to call or return if symptoms worsen but no further workup seems indicated at this particular time.    3.  Mixed hyperlipidemia: The patient is stable on her statin therapy.  She tells me that her lipids are followed by her primary care provider and have been at goal.    4.  Tobacco abuse: I advised Leidy of the risks of continuing to use tobacco.  During this visit, I spent 5 minutes counseling the patient regarding tobacco cessation.    Patient's Body mass index is 34.78 kg/m². BMI is above normal parameters. Recommendations include: exercise counseling and nutrition counseling.    Follow-up: 6 months unless needed sooner

## 2018-11-14 PROCEDURE — 93000 ELECTROCARDIOGRAM COMPLETE: CPT | Performed by: INTERNAL MEDICINE

## 2019-04-22 ENCOUNTER — OFFICE VISIT (OUTPATIENT)
Dept: PSYCHIATRY | Age: 39
End: 2019-04-22
Payer: COMMERCIAL

## 2019-04-22 VITALS
HEIGHT: 65 IN | OXYGEN SATURATION: 98 % | SYSTOLIC BLOOD PRESSURE: 120 MMHG | HEART RATE: 91 BPM | DIASTOLIC BLOOD PRESSURE: 81 MMHG | BODY MASS INDEX: 34.99 KG/M2 | WEIGHT: 210 LBS

## 2019-04-22 DIAGNOSIS — F41.1 GAD (GENERALIZED ANXIETY DISORDER): ICD-10-CM

## 2019-04-22 DIAGNOSIS — F33.1 MAJOR DEPRESSIVE DISORDER, RECURRENT EPISODE, MODERATE (HCC): Primary | ICD-10-CM

## 2019-04-22 DIAGNOSIS — F12.10 MARIJUANA ABUSE: ICD-10-CM

## 2019-04-22 PROCEDURE — 90791 PSYCH DIAGNOSTIC EVALUATION: CPT | Performed by: COUNSELOR

## 2019-04-22 RX ORDER — ONDANSETRON 4 MG/1
4 TABLET, ORALLY DISINTEGRATING ORAL EVERY 6 HOURS PRN
COMMUNITY

## 2019-04-22 RX ORDER — ATORVASTATIN CALCIUM 80 MG/1
TABLET, FILM COATED ORAL NIGHTLY
Refills: 11 | COMMUNITY
Start: 2019-03-28

## 2019-04-22 RX ORDER — CLONIDINE HYDROCHLORIDE 0.1 MG/1
0.1 TABLET ORAL DAILY PRN
Refills: 2 | COMMUNITY
Start: 2019-04-04 | End: 2019-12-05 | Stop reason: DRUGHIGH

## 2019-04-22 RX ORDER — VENLAFAXINE HYDROCHLORIDE 150 MG/1
CAPSULE, EXTENDED RELEASE ORAL DAILY
Refills: 2 | COMMUNITY
Start: 2019-04-04 | End: 2019-07-12 | Stop reason: DRUGHIGH

## 2019-04-22 RX ORDER — TICAGRELOR 90 MG/1
90 TABLET ORAL NIGHTLY
Refills: 11 | COMMUNITY
Start: 2019-04-01
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
## 2019-05-13 ENCOUNTER — OFFICE VISIT (OUTPATIENT)
Dept: CARDIOLOGY | Facility: CLINIC | Age: 39
End: 2019-05-13

## 2019-05-13 VITALS
OXYGEN SATURATION: 99 % | BODY MASS INDEX: 35.49 KG/M2 | WEIGHT: 213 LBS | SYSTOLIC BLOOD PRESSURE: 110 MMHG | HEART RATE: 74 BPM | DIASTOLIC BLOOD PRESSURE: 76 MMHG | HEIGHT: 65 IN

## 2019-05-13 DIAGNOSIS — Z72.0 TOBACCO ABUSE: ICD-10-CM

## 2019-05-13 DIAGNOSIS — I25.10 CORONARY ARTERY DISEASE INVOLVING NATIVE CORONARY ARTERY OF NATIVE HEART WITHOUT ANGINA PECTORIS: Primary | ICD-10-CM

## 2019-05-13 DIAGNOSIS — E78.2 MIXED HYPERLIPIDEMIA: ICD-10-CM

## 2019-05-13 PROBLEM — R07.89 ATYPICAL CHEST PAIN: Status: RESOLVED | Noted: 2018-11-13 | Resolved: 2019-05-13

## 2019-05-13 PROCEDURE — 99214 OFFICE O/P EST MOD 30 MIN: CPT | Performed by: INTERNAL MEDICINE

## 2019-05-13 PROCEDURE — 93000 ELECTROCARDIOGRAM COMPLETE: CPT | Performed by: INTERNAL MEDICINE

## 2019-05-13 RX ORDER — ADALIMUMAB 40MG/0.4ML
40 KIT SUBCUTANEOUS
COMMUNITY
Start: 2019-05-02

## 2019-05-13 NOTE — PROGRESS NOTES
Subjective:     Encounter Date: 5/13/2019      Patient ID: Leidy Lerma is a 38 y.o. female with coronary artery disease, status post PCI of the right coronary artery at the time of an inferior STEMI (10/2017), hyperlipidemia, tobacco abuse who presents today for follow-up.    Chief Complaint: Follow-up    Coronary Artery Disease   Presents for follow-up visit. Pertinent negatives include no chest pain, dizziness, leg swelling, palpitations or shortness of breath. The symptoms have been stable. Compliance with diet is variable. Compliance with exercise is variable. Compliance with medications is good.     This patient presents today for routine follow-up of coronary artery disease.  She says that from a cardiac standpoint, she has been doing well.  No recurrent chest pain.  No significant shortness of breath or dyspnea on exertion.  She says that she has had some anxiety and even a panic attack.  She has had multiple life stressors.  Unfortunately, because of this, she has continued to smoke.  She denies orthopnea, PND, edema, palpitations, lightheadedness, dizziness, syncope.  She reports that her blood pressure has been well controlled.  She tells me that her cholesterol has been checked although she does not have access to these records at this time.  She does remain on high intensity statin therapy and has not had any side effects from this.  She also remains on dual antiplatelet therapy.  Some intermittent bruising but nothing significant.  Certainly, no significant bleeding episodes.  Otherwise, she has had some skin issues and has been diagnosed with psoriasis.  She is now on Humira for this, and she is tolerating this well so far.      Current Outpatient Medications:   •  aspirin 81 MG EC tablet, Take 1 tablet by mouth Daily., Disp: 30 tablet, Rfl: 11  •  atorvastatin (LIPITOR) 80 MG tablet, Take 1 tablet by mouth Every Night., Disp: 30 tablet, Rfl: 11  •  HUMIRA PEN 40 MG/0.4ML Pen-injector Kit, Inject  40 mg under the skin into the appropriate area as directed Every 14 (Fourteen) Days., Disp: , Rfl:   •  metoprolol tartrate (LOPRESSOR) 25 MG tablet, Take 1 tablet by mouth Every 12 (Twelve) Hours., Disp: 60 tablet, Rfl: 11  •  nitroglycerin (NITROSTAT) 0.4 MG SL tablet, Place 1 tablet under the tongue Every 5 (Five) Minutes As Needed for Chest Pain. Take no more than 3 doses in 15 minutes., Disp: 30 tablet, Rfl: 12  •  ticagrelor (BRILINTA) 90 MG tablet tablet, Take 1 tablet by mouth Every 12 (Twelve) Hours., Disp: 60 tablet, Rfl: 11  •  VENLAFAXINE HCL PO, Take 150 mg by mouth Daily., Disp: , Rfl:   •  vitamin D (ERGOCALCIFEROL) 68222 units capsule capsule, Take 50,000 Units by mouth 1 (One) Time Per Week., Disp: , Rfl:   •  varenicline (CHANTIX CONTINUING MONTH OTIS) 1 MG tablet, Take 1 tablet by mouth 2 (Two) Times a Day., Disp: 60 tablet, Rfl: 4  •  varenicline (CHANTIX STARTING MONTH OTIS) 0.5 MG X 11 & 1 MG X 42 tablet, Take 0.5 mg one daily on days 1-3 and 0.5 mg twice daily on days 4-7. Then 1 mg twice daily for a total of 12 weeks., Disp: 42 tablet, Rfl: 0    Allergies   Allergen Reactions   • Penicillins Hives     Social History     Tobacco Use   • Smoking status: Current Every Day Smoker     Packs/day: 1.00     Types: Cigarettes   • Smokeless tobacco: Never Used   Substance Use Topics   • Alcohol use: Yes     Comment: 1-2 drinks a week of liquor     Review of Systems   Constitution: Negative for weakness and weight loss.   Cardiovascular: Negative for chest pain, claudication, dyspnea on exertion, irregular heartbeat, leg swelling, orthopnea, palpitations, paroxysmal nocturnal dyspnea and syncope.   Respiratory: Negative for cough, hemoptysis, shortness of breath and wheezing.    Endocrine: Negative for cold intolerance and heat intolerance.   Hematologic/Lymphatic: Negative for bleeding problem. Bruises/bleeds easily.   Skin: Positive for itching and rash.   Gastrointestinal: Negative for abdominal pain,  "hematemesis, hematochezia, melena, nausea and vomiting.   Neurological: Negative for dizziness, headaches and loss of balance.   Psychiatric/Behavioral: The patient is nervous/anxious.        ECG 12 Lead  Date/Time: 5/13/2019 2:06 PM  Performed by: Sergo Aguillon MD  Authorized by: Sergo Aguillon MD   Comparison: compared with previous ECG from 11/12/2018  Similar to previous ECG  Rhythm: sinus rhythm  Rate: normal  BPM: 73  Conduction: conduction normal  QRS axis: normal  Other findings: non-specific ST-T wave changes and left atrial abnormality    Clinical impression: non-specific ECG             Objective:     Physical Exam   Constitutional: She is oriented to person, place, and time. She appears well-developed and well-nourished. No distress.   HENT:   Head: Normocephalic and atraumatic.   Mouth/Throat: Oropharynx is clear and moist.   Eyes: EOM are normal. Pupils are equal, round, and reactive to light.   Neck: Normal range of motion. Neck supple. No JVD present. No thyromegaly present.   Cardiovascular: Normal rate, regular rhythm, S1 normal, S2 normal, normal heart sounds and intact distal pulses. Exam reveals no gallop and no friction rub.   No murmur heard.  Pulmonary/Chest: Effort normal and breath sounds normal.   Abdominal: Soft. Bowel sounds are normal. She exhibits no distension. There is no tenderness.   Musculoskeletal: Normal range of motion. She exhibits no edema.   Neurological: She is alert and oriented to person, place, and time. No cranial nerve deficit.   Skin: Skin is warm and dry. No rash noted. No cyanosis or erythema. Nails show no clubbing.   Multiple psoriasis plaques noted on the lower extremities   Psychiatric: She has a normal mood and affect.   Vitals reviewed.    /76 (BP Location: Left arm, Patient Position: Sitting)   Pulse 74   Ht 165.1 cm (65\")   Wt 96.6 kg (213 lb)   SpO2 99%   BMI 35.45 kg/m²     Data/Lab Review:     Lab Results   Component Value " Date    CHOL 248 (H) 10/06/2017    TRIG 273 (H) 10/06/2017    HDL 27 (L) 10/06/2017     (H) 10/06/2017     Cardiac Cath 10/17:    Selective Coronary Angiography:     Left Main Coronary Artery: The left main coronary artery arises from the left coronary cusp and bifurcates into the LAD and left circumflex arteries. Luminal irregularities are present, but no significant disease.     Left Anterior Descending Artery: The left anterior descending artery arises from the distal left main coronary artery and supplies one significant diagonal branch and a second very small diagonal branch and the left anterior descending artery terminates by wrapping the apex.  There is mild disease throughout the course of the left anterior descending artery with irregularities up to 30%or so.  The diagonal branch also has mild to moderate disease but no significant obstructive disease identified throughout the LAD of the diagonal branches..      Left Circumflex: The left circumflex artery arises from the distal left main artery and supplies essentially 2 obtuse marginals.  The first obtuse marginal has mild diffuse disease throughout the course the vessel.  The second obtuse marginal essentially terminates as 2 terminal branches each of which have mild irregularities.  The circumflex has no significant obstructive disease but irregularities up to 30%.     Right Coronary Artery: The right coronary artery arises from the right coronary cusp and is dominant for the posterior circulation.  On the initial angiograms, the right coronary artery is open but does have a subtotal occlusion in the midportion the vessel just at the takeoff of a right ventricular marginal branch.  The proximal portion the vessels diffusely diseased and then there is an ectatic portion just proximal to the subtotally occluded segment.  After this, there are 2 right ventricular marginal branches present.  Distally, the right coronary artery provides flow to a  "small posterior descending artery multiple posterolateral branches.  The entire vessel has diffuse disease throughout its course.     Percutaneous Coronary Intervention to the mid right coronary artery:      Guide Catheter: 6 Estonian JR4  Anticoagulation: Unfractionated heparin  Wire(s): 0.014\" Prowater     A 0.014 inch Prowater wire was used to cross the stenosis in the mid right coronary artery and advanced distally in a stable position.  The lesion was then dilated with a 2.0 x 12 mm balloon.  After this, the lesion was stented with a Xience 3.5 x 23 mm drug-eluting stent.  After this, the proximal portion was postdilated with a noncompliant 4.0 x 12 mm balloon.  Follow-up angiography revealed no residual stenosis, BRAN-3 flow down the right coronary artery and preserved flow in the right ventricular marginal branches.  There is no evidence of dissection, perforation, distal embolization.  The proximal portion the vessel remains diffusely diseased and was intentionally left untreated.  The result at the stent site is thought to be a suitable angiographic result.      Assessment:          Diagnosis Plan   1. Coronary artery disease involving native coronary artery of native heart without angina pectoris  ECG 12 Lead   2. Mixed hyperlipidemia     3. Tobacco abuse          Plan:       1.  Coronary artery disease: Clinically stable at this time with no ischemic symptoms.  At this time, given that this patient is far greater than 12 months out from her PCI, she can discontinue Brilinta, however she should continue aspirin 81 mg daily.  Also, continue other cardiac medications.    2.  Mixed hyperlipidemia: The patient continues high intensity statin therapy.  Her lipid panel at the time of her event is noted above, but this was prior to initiation of high intensity statin therapy.  The patient will try to have her lipid panel sent to us for review.    3.  Tobacco abuse: I advised Leidy of the risks of continuing to " use tobacco.  During this visit, I spent 5 minutes counseling the patient regarding tobacco cessation.    Patient's Body mass index is 35.45 kg/m². BMI is above normal parameters. Recommendations include: exercise counseling and nutrition counseling.    Follow-up: 12 months unless needed sooner

## 2019-05-31 ENCOUNTER — OFFICE VISIT (OUTPATIENT)
Dept: PSYCHIATRY | Age: 39
End: 2019-05-31
Payer: COMMERCIAL

## 2019-05-31 VITALS
OXYGEN SATURATION: 100 % | HEART RATE: 87 BPM | WEIGHT: 210 LBS | SYSTOLIC BLOOD PRESSURE: 131 MMHG | DIASTOLIC BLOOD PRESSURE: 87 MMHG | HEIGHT: 65 IN | BODY MASS INDEX: 34.99 KG/M2

## 2019-05-31 DIAGNOSIS — F41.0 GENERALIZED ANXIETY DISORDER WITH PANIC ATTACKS: ICD-10-CM

## 2019-05-31 DIAGNOSIS — F33.1 MODERATE EPISODE OF RECURRENT MAJOR DEPRESSIVE DISORDER (HCC): Primary | ICD-10-CM

## 2019-05-31 DIAGNOSIS — F41.1 GENERALIZED ANXIETY DISORDER WITH PANIC ATTACKS: ICD-10-CM

## 2019-05-31 PROCEDURE — 90792 PSYCH DIAG EVAL W/MED SRVCS: CPT | Performed by: NURSE PRACTITIONER

## 2019-05-31 RX ORDER — BUSPIRONE HYDROCHLORIDE 10 MG/1
10 TABLET ORAL 3 TIMES DAILY
Qty: 90 TABLET | Refills: 3 | Status: SHIPPED | OUTPATIENT
Start: 2019-05-31 | End: 2019-06-12 | Stop reason: DRUGHIGH

## 2019-05-31 RX ORDER — VENLAFAXINE HYDROCHLORIDE 75 MG/1
75 CAPSULE, EXTENDED RELEASE ORAL DAILY
Qty: 30 CAPSULE | Refills: 3 | Status: SHIPPED | OUTPATIENT
Start: 2019-05-31 | End: 2019-07-12 | Stop reason: DRUGHIGH

## 2019-05-31 SDOH — HEALTH STABILITY: MENTAL HEALTH: HOW OFTEN DO YOU HAVE A DRINK CONTAINING ALCOHOL?: MONTHLY OR LESS

## 2019-05-31 SDOH — HEALTH STABILITY: MENTAL HEALTH: HOW MANY STANDARD DRINKS CONTAINING ALCOHOL DO YOU HAVE ON A TYPICAL DAY?: 1 OR 2
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
## 2019-06-05 ENCOUNTER — HOSPITAL ENCOUNTER (OUTPATIENT)
Dept: PSYCHIATRY | Age: 39
Setting detail: THERAPIES SERIES
Discharge: HOME | End: 2019-06-05
Payer: COMMERCIAL

## 2019-06-05 VITALS
WEIGHT: 218 LBS | HEART RATE: 85 BPM | TEMPERATURE: 98.7 F | RESPIRATION RATE: 18 BRPM | OXYGEN SATURATION: 98 % | HEIGHT: 65 IN | DIASTOLIC BLOOD PRESSURE: 81 MMHG | SYSTOLIC BLOOD PRESSURE: 118 MMHG | BODY MASS INDEX: 36.32 KG/M2

## 2019-06-05 DIAGNOSIS — F41.0 GENERALIZED ANXIETY DISORDER WITH PANIC ATTACKS: ICD-10-CM

## 2019-06-05 DIAGNOSIS — F33.1 MODERATE EPISODE OF RECURRENT MAJOR DEPRESSIVE DISORDER (HCC): Primary | ICD-10-CM

## 2019-06-05 DIAGNOSIS — F41.1 GENERALIZED ANXIETY DISORDER WITH PANIC ATTACKS: ICD-10-CM

## 2019-06-05 PROCEDURE — 90853 GROUP PSYCHOTHERAPY: CPT | Performed by: SOCIAL WORKER

## 2019-06-05 NOTE — BH NOTE
ADMIT NOTE FOR IOP:  Pt referred to IOP by Fior DILLARD due to depression and anxiety that is affecting her ability to function. Pt reports that she took off from her job for 5 weeks from  to May 6 due to her depression and anxiety. She has returned to work and now has less work load. Pt reports some difficulty with her concentration at sometimes \"zones out at [facility]". Pt reports that her anxiety and depression are at a 6 to 7 with 10 being the highest.  She denies any suicidal thoughts or hx of attempt. She does describes intermittent suicidal thoughts but says she does not think she would ever harm herself. She was given resources for if she became unable to keep herself safe to include call 911, call crisis line, go to nearest ER or [address] and given phone number for [facility] in. Pt denies any thoughts to harm others. She denies any psychosis. She says that she used to drink [place] lot\" but now drinks a couple of drinks about once monthly. She reports that she smokes Marijuana of a \"small amount about 3 times a day\". Pt says that she takes Xanax that is not prescribed to her of 2 mg -2 to 3 tabs a day for about 5 yrs. She reports that she last took 1 mg yesterday. She says she wants off of the Xanax--she was made aware of dangers stopping the med abruptly. Maxine Bender is aware of pt's Xanax use. Pt reports that she is isolative and when not working often stays in the bed. She says that she does not experience \"leyla\" in her life. She reports that she worries \"a lot\" and counts her steps often. Pt reports that her gmother who had alzheimer's  one yr ago and her mother  last August.  Pt says her mother was her main support. Medically pt reports: hx of MI with stent placement about 1 1/2 yr ago and is now on anticoagulant. Pt with hx of psoriasis and is on Humira. Pt has VINOD and uses CPAP.   Pt reports that she continues to smoke and recognizes to need to stop juan with her hx of heart disease. Pt did not want to commit to a quit date but did accept written and verbal info on dangers of smoking and option of treatment to stop. A:  Oriented to IOP and support provided. Medications reviewed and discussed. Nursing assessment completed. R:  Pt with sad and worried affect. Pt can benefit from IOP to stabilize her mood and anxiety and to learn effective coping skills.

## 2019-06-05 NOTE — PLAN OF CARE
Problem: Falls - Risk of:  Goal: Absence of falls  Outcome: Ongoing  Note:   D:  Pt admitted to [facility]. Pt denies hx of falls. She is on several meds to include B/P med, antidepressant and anticoagulant. A:  Pt given verbal and written info on things that may increase the RTF and measures to reduce risk. Discussed benefits of changing position slowly especially from lying to standing. Pt encouraged to inform staff of any dizziness, drowsiness or change in gait. Discussed need to avoid falls if possible due to anticoagulant medication. R:  Pt verbalized understanding of info provided and plans to follow.

## 2019-06-05 NOTE — PROGRESS NOTES
Admission Note      Reason for admission/Target Symptom: [facility]-Cl[facility] APRN referred to IOP due to overwhleming life stressors, multiple grieving, work stressors, single mother, DCBS issues, Xanax and marijuana abuse, struggling with ADLS on weekends-hard time getting out of bed, and isolating. Diagnoses: MDD-Moderate    Due you have access to weapons? NO  Do we have permission to call your family/friend? YES    Pt was admitted to [facility]- Intensive Outpatient Group Therapy Program for 3 days a week from 8:00-12:30pm.     LCSW and Treatment team has  identified patients discharge needs as medication management and therapy. Pt validated need for appointments. Pt was also provided a handout of contact information for drug and alcohol treatment centers and other community support service such as [facility] and [facility] . Individual Session Note    LCSW met with pt for 15+ minutes to complete CSSRS, initial treatment plan, and treatment plan signature sheet. In the last 6 months has the pt been a danger to self: NO  In the last 6 months has the pt been a danger to others: NO    Patient refused/declined practical counseling of tobacco practical counseling during the hospital stay. Community Support Groups Pt was informed about: [facility], [facility], NA, [address] [facility] [address], [address] [facility], [facility]orce [facility], Grief Support, and offered to connect with any other community support groups. Pt was also informed about [facility] numbers, [address] [facility] [address], Local ER, and 911. CARE PLAN/ TREATMENT PLAN:      LCSW met with pt to develop care plan goals, developed by pt and [facility] staff. Pt verbalized agreement of care plan goals and was informed goals can be modified as needed. Treatment team and pt completed the signature sheet.

## 2019-06-05 NOTE — PLAN OF CARE
Problem: Altered Mood, Depressive Behavior:  Goal: Participates in care planning  Description  Participates in care planning  Outcome: Ongoing  Note:                                                                       Group Therapy Note    Date: 6/5/2019  Start Time: 1115  End Time:  1215  Number of Participants: 5    Type of Group: Cognitive Skills    Patient's Goal:  Process emotions and actions in how to relate to others or their relationship with others. Notes: Activity: Draw 3 masks, who others see, who you are, and who you want to be. Pt attended process group as schedule. Pt participated by identified an issue to work on today regarding how they interact and relate to others. Participated in group discussion. Pt gave support and encouragement to group members. Status After Intervention:  Unchanged    Participation Level:  Active Listener and Interactive    Participation Quality: Appropriate, Attentive and Sharing      Speech:  normal      Thought Process/Content: Logical  Linear      Affective Functioning: Congruent      Mood: anxious and depressed      Level of consciousness:  Alert, Oriented x4 and Attentive      Response to Learning: Able to verbalize current knowledge/experience and Progressing to goal      Endings: None Reported    Modes of Intervention: Education, Support, Socialization, Exploration and Clarifying      Discipline Responsible: /Counselor      Signature:  PATRICIO Simmons

## 2019-06-06 ENCOUNTER — HOSPITAL ENCOUNTER (OUTPATIENT)
Dept: PSYCHIATRY | Age: 39
Setting detail: THERAPIES SERIES
Discharge: HOME | End: 2019-06-06
Payer: COMMERCIAL

## 2019-06-06 PROCEDURE — 90853 GROUP PSYCHOTHERAPY: CPT | Performed by: SOCIAL WORKER

## 2019-06-06 PROCEDURE — 90837 PSYTX W PT 60 MINUTES: CPT | Performed by: SOCIAL WORKER

## 2019-06-06 ASSESSMENT — ANXIETY QUESTIONNAIRES
1. FEELING NERVOUS, ANXIOUS, OR ON EDGE: 3-NEARLY EVERY DAY
5. BEING SO RESTLESS THAT IT IS HARD TO SIT STILL: 0-NOT AT ALL SURE
6. BECOMING EASILY ANNOYED OR IRRITABLE: 3-NEARLY EVERY DAY
4. TROUBLE RELAXING: 3-NEARLY EVERY DAY
3. WORRYING TOO MUCH ABOUT DIFFERENT THINGS: 3-NEARLY EVERY DAY
7. FEELING AFRAID AS IF SOMETHING AWFUL MIGHT HAPPEN: 3-NEARLY EVERY DAY
GAD7 TOTAL SCORE: 18
2. NOT BEING ABLE TO STOP OR CONTROL WORRYING: 3-NEARLY EVERY DAY

## 2019-06-06 ASSESSMENT — SLEEP AND FATIGUE QUESTIONNAIRES
DO YOU HAVE DIFFICULTY SLEEPING: YES
DIFFICULTY ARISING: NO
SLEEP PATTERN: DIFFICULTY FALLING ASLEEP;DISTURBED/INTERRUPTED SLEEP;EARLY AWAKENING
RESTFUL SLEEP: YES
DIFFICULTY STAYING ASLEEP: YES
DIFFICULTY FALLING ASLEEP: YES
DO YOU USE A SLEEP AID: NO
AVERAGE NUMBER OF SLEEP HOURS: 5

## 2019-06-06 ASSESSMENT — PATIENT HEALTH QUESTIONNAIRE - PHQ9: SUM OF ALL RESPONSES TO PHQ QUESTIONS 1-9: 14

## 2019-06-06 ASSESSMENT — LIFESTYLE VARIABLES: HISTORY_ALCOHOL_USE: NO

## 2019-06-06 NOTE — PLAN OF CARE
Problem: Anxiety:  Goal: Level of anxiety will decrease  Description  Level of anxiety will decrease  Outcome: Ongoing  Note:                                                                       Group Therapy Note    Date: 6/6/2019  Start Time: 1115  End Time:  1215  Number of Participants: 9    Type of Group: Cognitive Skills    Patient's Goal: Discussion: setting own boundaries and deal breakers in relationships with others. Process emotions and actions in how to relate to others or their relationship with others. Notes:  Pt attended process group as schedule. Pt participated by identified an issue to work on today regarding how they interact and relate to others. Participated in group discussion. Pt gave support and encouragement to group members. Status After Intervention:  Unchanged    Participation Level:  Active Listener and Interactive    Participation Quality: Appropriate, Attentive and Sharing      Speech:  normal      Thought Process/Content: Logical  Linear      Affective Functioning: Congruent      Mood: anxious and depressed      Level of consciousness:  Alert, Oriented x4 and Attentive      Response to Learning: Able to verbalize current knowledge/experience, Able to retain information and Progressing to goal      Endings: None Reported    Modes of Intervention: Education, Support, Socialization, Exploration and Clarifying      Discipline Responsible: /Counselor      Signature:  PATRICIO Toscano

## 2019-06-06 NOTE — PLAN OF CARE
Problem: Altered Mood, Depressive Behavior:  Goal: Able to verbalize acceptance of life and situations over which he or she has no control  Description  Able to verbalize acceptance of life and situations over which he or she has no control  Outcome: Ongoing  Note:                                                                       Group Therapy Note    Date: 6/6/2019  Start Time: 0830  End Time:  0945  Number of Participants: 9    Type of Group: Psychotherapy    Patient's Goal:  Process emotions and actions in how to relate to others or their relationship with others. Notes:  Pt attended process group as schedule. Pt participated by identified an issue to work on today regarding how they interact and relate to others. Participated in group discussion. Pt gave support and encouragement to group members. Pt shared losses, work stressors, family stressors, and friend stressors with group members. Status After Intervention:  Unchanged    Participation Level:  Active Listener and Interactive    Participation Quality: Appropriate, Attentive, Sharing and Supportive      Speech:  normal      Thought Process/Content: Linear      Affective Functioning: Congruent      Mood: anxious and depressed      Level of consciousness:  Alert, Oriented x4 and Attentive      Response to Learning: Able to verbalize current knowledge/experience and Progressing to goal      Endings: None Reported    Modes of Intervention: Education, Support, Socialization, Exploration and Clarifying      Discipline Responsible: /Counselor      Signature:  PATRICIO Lucas

## 2019-06-06 NOTE — PROGRESS NOTES
Individual Session Note    LCSW met with pt for 60+ minutes to complete Psychosocial.   In the last 6 months has the pt been a danger to self: NO  In the last 6 months has the pt been a danger to others: NO  Patient refused/declined practical counseling of tobacco practical counseling during the hospital stay. Community Support Groups Pt was informed about: [facility], [facility], NA, [address] [facility] [address] [place], [address] [facility], Divorce Care, Grief Support, and offered to connect with any other community support groups. Pt was also informed about [facility] numbers, [address] [facility], Local ER, and 911.

## 2019-06-06 NOTE — PLAN OF CARE
Problem: Substance Abuse:  Goal: Participates in care planning  Description  Participates in care planning  Outcome: Ongoing  Note:                                                                       Group Therapy Note    Date: 6/6/2019  Start Time: 1000  End Time:  1100  Number of Participants: 9    Type of Group: Psychoeducation    Patient's Goal: Discussion: building resentment versus verbalizing we can choose to do things for other or choose not to. Process emotions and actions in how to relate to others or their relationship with others. Notes:  Pt attended process group as schedule. Pt participated by identified an issue to work on today regarding how they interact and relate to others. Participated in group discussion. Pt gave support and encouragement to group members. Status After Intervention:  Unchanged    Participation Level:  Active Listener and Interactive    Participation Quality: Appropriate, Attentive, Sharing and Supportive      Speech:  normal      Thought Process/Content: Logical  Linear      Affective Functioning: Congruent      Mood: anxious and depressed      Level of consciousness:  Alert, Oriented x4 and Attentive      Response to Learning: Able to verbalize current knowledge/experience, Able to retain information and Progressing to goal      Endings: None Reported    Modes of Intervention: Education, Support, Socialization, Exploration and Clarifying      Discipline Responsible: /Counselor      Signature:  PATRICIO Obrien

## 2019-06-07 ENCOUNTER — HOSPITAL ENCOUNTER (OUTPATIENT)
Dept: PSYCHIATRY | Age: 39
Setting detail: THERAPIES SERIES
Discharge: HOME | End: 2019-06-07
Payer: COMMERCIAL

## 2019-06-07 VITALS
DIASTOLIC BLOOD PRESSURE: 91 MMHG | OXYGEN SATURATION: 100 % | SYSTOLIC BLOOD PRESSURE: 124 MMHG | RESPIRATION RATE: 18 BRPM | HEART RATE: 75 BPM

## 2019-06-07 PROCEDURE — 90853 GROUP PSYCHOTHERAPY: CPT | Performed by: SOCIAL WORKER

## 2019-06-07 NOTE — PLAN OF CARE
Problem: Altered Mood, Depressive Behavior:  Goal: Able to verbalize support systems  Description  Able to verbalize support systems  Outcome: Ongoing  Note:                                                                       Group Therapy Note    Date: 6/7/2019  Start Time: 0830  End Time:  0945  Number of Participants: 5    Type of Group: Psychotherapy    Patient's Goal:  Process emotions and actions in how to relate to others or their relationship with others. Notes:  Pt attended process group as schedule. Pt participated by identified an issue to work on today regarding how they interact and relate to others. Participated in group discussion. Pt gave support and encouragement to group members. Pt shared about her grandmother, today is the one year anniversary of her death. Status After Intervention:  Unchanged    Participation Level:  Active Listener and Interactive    Participation Quality: Appropriate, Attentive and Sharing      Speech:  normal      Thought Process/Content: Linear      Affective Functioning: Congruent      Mood: anxious and depressed      Level of consciousness:  Alert, Oriented x4 and Attentive      Response to Learning: Able to verbalize current knowledge/experience and Progressing to goal      Endings: None Reported    Modes of Intervention: Education, Support, Socialization, Exploration and Clarifying      Discipline Responsible: /Counselor      Signature:  PATRICIO Prater

## 2019-06-07 NOTE — PLAN OF CARE
Problem: Anxiety:  Goal: Level of anxiety will decrease  Description  Level of anxiety will decrease  Outcome: Ongoing  Note:                                                                       Group Therapy Note    Date: 6/7/2019  Start Time: 200  End Time:  4859  Number of Participants: 5    Type of Group: Cognitive Skills    Patient's Goal: Handout: 27 Day Blog Challenge, discussion on how journaling is a positive coping skill and ways to journal. Process emotions and actions in how to relate to others or their relationship with others. Notes:  Pt attended process group as schedule. Pt participated by identified an issue to work on today regarding how they interact and relate to others. Participated in group discussion. Pt gave support and encouragement to group members. Status After Intervention:  Improved    Participation Level:  Active Listener and Interactive    Participation Quality: Appropriate, Attentive, Sharing and Supportive      Speech:  normal      Thought Process/Content: Logical  Linear      Affective Functioning: Congruent      Mood: anxious and depressed      Level of consciousness:  Alert, Oriented x4 and Attentive      Response to Learning: Able to verbalize current knowledge/experience and Progressing to goal      Endings: None Reported    Modes of Intervention: Education, Support, Socialization, Exploration and Clarifying      Discipline Responsible: /Counselor      Signature:  PATRICIO Reynaga

## 2019-06-11 ENCOUNTER — HOSPITAL ENCOUNTER (OUTPATIENT)
Dept: PSYCHIATRY | Age: 39
Setting detail: THERAPIES SERIES
Discharge: HOME | End: 2019-06-11
Payer: COMMERCIAL

## 2019-06-11 VITALS — RESPIRATION RATE: 18 BRPM | DIASTOLIC BLOOD PRESSURE: 88 MMHG | HEART RATE: 83 BPM | SYSTOLIC BLOOD PRESSURE: 126 MMHG

## 2019-06-11 PROCEDURE — 90853 GROUP PSYCHOTHERAPY: CPT | Performed by: SOCIAL WORKER

## 2019-06-11 NOTE — PLAN OF CARE
Problem: Altered Mood, Depressive Behavior:  Goal: Able to verbalize acceptance of life and situations over which he or she has no control  Description  Able to verbalize acceptance of life and situations over which he or she has no control  Outcome: Ongoing  Note:                                                                       Group Therapy Note    Date: 6/11/2019  Start Time: 1000  End Time:  1100  Number of Participants: 9    Type of Group: Psychoeducation    Wellness Binder Information  Module Name:  Women's Issues  Session Number:  4    Patient's Goal:  To raise awareness of how thoughts influence feelings    Notes:  Pt demonstrated improved awareness of how thoughts influence feelings by actively participating in group discussion.     Status After Intervention:  Unchanged    Participation Level: Interactive    Participation Quality: Attentive      Speech:  normal      Thought Process/Content: Logical      Affective Functioning: Congruent      Mood: anxious and depressed      Level of consciousness:  Alert and Oriented x4      Response to Learning: Able to verbalize current knowledge/experience, Able to verbalize/acknowledge new learning and Progressing to goal      Endings: None Reported    Modes of Intervention: Education      Discipline Responsible: Psychoeducational Specialist      Signature:  Nilton Finney

## 2019-06-12 ENCOUNTER — HOSPITAL ENCOUNTER (OUTPATIENT)
Dept: PSYCHIATRY | Age: 39
Setting detail: THERAPIES SERIES
Discharge: HOME | End: 2019-06-12
Payer: COMMERCIAL

## 2019-06-12 PROCEDURE — 90853 GROUP PSYCHOTHERAPY: CPT

## 2019-06-12 RX ORDER — BUSPIRONE HYDROCHLORIDE 15 MG/1
15 TABLET ORAL 3 TIMES DAILY
Qty: 90 TABLET | Refills: 1 | Status: SHIPPED | OUTPATIENT
Start: 2019-06-12 | End: 2019-07-12 | Stop reason: DRUGHIGH

## 2019-06-12 NOTE — PLAN OF CARE
Problem: Altered Mood, Depressive Behavior:  Goal: Able to verbalize support systems  Description  Able to verbalize support systems  Outcome: Ongoing  Note:                                                                       Group Therapy Note    Date: 6/12/2019  Start Time: 1115  End Time:  1215  Number of Participants: 6    Type of Group: Cognitive Skills    Patient's Goal: Discussion: When trying to control things beyond our control, does it matter if we are right in the end? Process emotions and actions in how to relate to others or their relationship with others. Notes:  Pt attended process group as schedule. Pt participated by identified an issue to work on today regarding how they interact and relate to others. Participated in group discussion with knowledged of rationalization and justifications as cognitive errors. Pt gave support and encouragement to group members. Status After Intervention:  Unchanged    Participation Level:  Active Listener and Interactive    Participation Quality: Appropriate, Attentive, Sharing and Supportive      Speech:  normal      Thought Process/Content: Logical  Linear      Affective Functioning: Congruent      Mood: anxious and depressed      Level of consciousness:  Alert, Oriented x4 and Attentive      Response to Learning: Able to verbalize current knowledge/experience and Progressing to goal      Endings: None Reported    Modes of Intervention: Education, Support, Socialization, Exploration and Clarifying      Discipline Responsible: /Counselor      Signature:  PATRICIO Simmons

## 2019-06-13 ENCOUNTER — HOSPITAL ENCOUNTER (OUTPATIENT)
Dept: PSYCHIATRY | Age: 39
Setting detail: THERAPIES SERIES
Discharge: HOME | End: 2019-06-13
Payer: COMMERCIAL

## 2019-06-13 VITALS — RESPIRATION RATE: 18 BRPM | HEART RATE: 79 BPM | SYSTOLIC BLOOD PRESSURE: 133 MMHG | DIASTOLIC BLOOD PRESSURE: 93 MMHG

## 2019-06-13 PROCEDURE — 90853 GROUP PSYCHOTHERAPY: CPT

## 2019-06-13 NOTE — PLAN OF CARE
Problem: Altered Mood, Depressive Behavior:  Goal: Able to verbalize acceptance of life and situations over which he or she has no control  Description  Able to verbalize acceptance of life and situations over which he or she has no control  Outcome: Ongoing  Note:                                                                       Group Therapy Note    Date: 6/13/2019  Start Time: 1000  End Time:  1100  Number of Participants: 8    Type of Group: Psychoeducation    Wellness Binder Information  Module Name:  Stress  Session Number:  5    Patient's Goal:  To raise awareness of the effectiveness of diversionary coping skills    Notes:  Pt demonstrated improved awareness of the effectiveness of diversionary coping skills by actively participating in group discussion.     Status After Intervention:  Unchanged    Participation Level: Interactive    Participation Quality: Attentive      Speech:  normal      Thought Process/Content: Logical      Affective Functioning: Congruent      Mood: anxious and depressed      Level of consciousness:  Alert and Oriented x4      Response to Learning: Able to verbalize current knowledge/experience, Able to verbalize/acknowledge new learning and Progressing to goal      Endings: None Reported    Modes of Intervention: Education      Discipline Responsible: Psychoeducational Specialist      Signature:  Ileene Castleman

## 2019-06-13 NOTE — PLAN OF CARE
Problem: Altered Mood, Depressive Behavior:  Goal: Able to verbalize and/or display a decrease in depressive symptoms  Description  Able to verbalize and/or display a decrease in depressive symptoms  Outcome: Ongoing  Note:                                                                       Group Therapy Note    Date: 6/13/2019  Start Time: 0830  End Time:  0945  Number of Participants: 8    Type of Group: Psychotherapy    Patient's Goal:  Process emotions and actions in how to relate to others or their relationship with others. Notes:  Pt attended process group as schedule. Pt participated by identified an issue to work on today regarding how they interact and relate to others. Participated in group discussion. Pt gave support and encouragement to group members. Pt reported having a discussion with her 2 children on her behaviors that effected them and what they would like see change. Status After Intervention:  Improved    Participation Level:  Active Listener and Interactive    Participation Quality: Appropriate, Attentive, Sharing and Supportive      Speech:  normal      Thought Process/Content: Logical  Linear      Affective Functioning: Congruent      Mood: anxious and depressed      Level of consciousness:  Alert, Oriented x4 and Attentive      Response to Learning: Able to verbalize current knowledge/experience and Progressing to goal      Endings: None Reported    Modes of Intervention: Education, Support, Socialization, Exploration and Clarifying      Discipline Responsible: /Counselor      Signature:  PATRICIO Nichols

## 2019-06-13 NOTE — PLAN OF CARE
Problem: Altered Mood, Depressive Behavior:  Goal: STG-Medication therapy compliance  Description  Pt will report any med side effects to staff. Outcome: Ongoing  Note:   D:  Pt is attending IOP this am.  She reports a fair concentration, motivation, appetite and sleep. She denies any thoughts to harm herself or others. She reports she is now justing taking the Xanax once daily and plans to continue to taper and stop. Buspar was increased yesterday. Pt say she wants to learn how to eliminate/deal with stressors so she can relax and enjoy life again. A:  support provided. Pt provided opportunity to ask any med/treatment questions. R:  Pt with sad affect and tense body language. She has good interaction with peers and staff.

## 2019-06-18 ENCOUNTER — HOSPITAL ENCOUNTER (OUTPATIENT)
Dept: PSYCHIATRY | Age: 39
Setting detail: THERAPIES SERIES
Discharge: HOME | End: 2019-06-18
Payer: COMMERCIAL

## 2019-06-18 PROCEDURE — 90853 GROUP PSYCHOTHERAPY: CPT | Performed by: SOCIAL WORKER

## 2019-06-18 NOTE — PLAN OF CARE
Problem: Altered Mood, Depressive Behavior:  Goal: Participates in care planning  Description  Participates in care planning  Outcome: Completed  Note:                                                                       Group Therapy Note    Date: 6/18/2019  Start Time: 1115  End Time:  1215  Number of Participants: 8    Type of Group: Cognitive Skills    Patient's Goal:  Process emotions and actions in how to relate to others or their relationship with others. Notes: Discussion: Stages of change and healthy language about wanting to change versus knowing change would be good. Pt attended process group as schedule. Pt participated by identified an issue to work on today regarding how they interact and relate to others. Participated in group discussion. Pt gave support and encouragement to group members. Status After Intervention:  Unchanged    Participation Level:  Active Listener and Interactive    Participation Quality: Appropriate, Attentive, Sharing and Supportive      Speech:  normal      Thought Process/Content: Logical  Linear      Affective Functioning: Congruent      Mood: anxious and depressed      Level of consciousness:  Alert, Oriented x4 and Attentive      Response to Learning: Able to verbalize current knowledge/experience and Progressing to goal      Endings: None Reported    Modes of Intervention: Education, Support, Socialization, Exploration and Clarifying      Discipline Responsible: /Counselor      Signature:  PATRICIO Reynaga

## 2019-06-18 NOTE — PLAN OF CARE
Problem: Falls - Risk of:  Goal: Absence of falls  Outcome: Ongoing  Note:   D:  Pt here for IOP this am.  Gait is steady. No c/o dizziness or drowsiness.

## 2019-06-18 NOTE — PLAN OF CARE
Problem: Altered Mood, Depressive Behavior:  Goal: STG-Medication therapy compliance  Description  Pt will report any med side effects to staff. Outcome: Ongoing  Note:   D:  Pt here for IOP this am.  She reports a low level of depression and moderate level of anxiety. She reports fair concentration, motivation and sleep. She reports a good appetite. She reports med compliance with no c/o side effects. Pt reports that she feels her meds are being effective. She reports that she has cut the Xanax lachelle to 1/2 mg at night. She admits that she is using Marijuana with last use last night. A:  support provided. Pt provided opportunity to ask any med/treatment questions. R:  Pt with good interactions with peer and staff. Pt tends to look worried.

## 2019-06-18 NOTE — PLAN OF CARE
Problem: Altered Mood, Depressive Behavior:  Goal: Ability to disclose and discuss suicidal ideas will improve  Description  Ability to disclose and discuss suicidal ideas will improve  Outcome: Ongoing  Note:                                                                       Group Therapy Note    Date: 6/18/2019  Start Time: 0830  End Time:  0945  Number of Participants: 8    Type of Group: Psychotherapy    Patient's Goal:  Process emotions and actions in how to relate to others or their relationship with others. Notes: Pt attended process group as schedule. Pt participated by identified an issue to work on today regarding how they interact and relate to others. Participated in group discussion. Pt gave support and encouragement to group members. Pt reported she was 50% done with moving out of her old house. Pt shared about stressors with changing her work schedule. Status After Intervention:  Unchanged    Participation Level:  Active Listener and Interactive    Participation Quality: Appropriate, Attentive, Sharing and Supportive      Speech:  normal      Thought Process/Content: Logical  Linear      Affective Functioning: Congruent      Mood: anxious and depressed      Level of consciousness:  Alert, Oriented x4 and Attentive      Response to Learning: Able to verbalize current knowledge/experience and Progressing to goal      Endings: None Reported    Modes of Intervention: Education, Support, Socialization, Exploration and Clarifying      Discipline Responsible: /Counselor      Signature:  PATRICIO Gomez

## 2019-06-18 NOTE — PROGRESS NOTES
ADMINISTRATIVE NOTE:  Reviewed and approved group notes authored by Demond fall. We discussed progress of this patient and group issues and participation level.

## 2019-06-19 ENCOUNTER — HOSPITAL ENCOUNTER (OUTPATIENT)
Dept: PSYCHIATRY | Age: 39
Setting detail: THERAPIES SERIES
Discharge: HOME | End: 2019-06-19
Payer: COMMERCIAL

## 2019-06-19 VITALS
RESPIRATION RATE: 18 BRPM | OXYGEN SATURATION: 99 % | SYSTOLIC BLOOD PRESSURE: 137 MMHG | HEART RATE: 85 BPM | DIASTOLIC BLOOD PRESSURE: 94 MMHG

## 2019-06-19 PROCEDURE — 90853 GROUP PSYCHOTHERAPY: CPT | Performed by: SOCIAL WORKER

## 2019-06-19 NOTE — PLAN OF CARE
Problem: Substance Abuse:  Goal: Participates in care planning  Description  Participates in care planning  Outcome: Completed  Note:                                                                       Group Therapy Note    Date: 6/19/2019  Start Time: 1000  End Time:  1100  Number of Participants: 7    Type of Group: Relapse Prevention    Patient's Goal: Discussion: Why having a support systems is important and how to build healthy relationships. Process emotions and actions in how to relate to others or their relationship with others. Notes:  Pt attended process group as schedule. Pt participated by identified an issue to work on today regarding how they interact and relate to others. Participated in group discussion. Pt gave support and encouragement to group members. Status After Intervention:  Unchanged    Participation Level:  Active Listener and Interactive    Participation Quality: Appropriate, Attentive, Sharing and Supportive      Speech:  normal      Thought Process/Content: Logical  Linear      Affective Functioning: Congruent      Mood: anxious and depressed      Level of consciousness:  Alert, Oriented x4 and Attentive      Response to Learning: Able to verbalize current knowledge/experience and Progressing to goal      Endings: None Reported    Modes of Intervention: Education, Support, Socialization, Exploration and Clarifying      Discipline Responsible: /Counselor      Signature:  PATRICIO Mckeon

## 2019-06-19 NOTE — PLAN OF CARE
Problem: Anxiety:  Goal: Level of anxiety will decrease  Description  Level of anxiety will decrease  Outcome: Ongoing  Note:                                                                       Group Therapy Note    Date: 6/19/2019  Start Time: 1115  End Time:  1215  Number of Participants: 7    Type of Group: Cognitive Skills    Patient's Goal: Discussion: Taking ownership of our consequences without find rationalizes and justifications for our behaviors. Process emotions and actions in how to relate to others or their relationship with others. Notes:  Pt attended process group as schedule. Pt participated by identified an issue to work on today regarding how they interact and relate to others. Participated in group discussion. Pt gave support and encouragement to group members. Status After Intervention:  Unchanged    Participation Level:  Active Listener and Interactive    Participation Quality: Appropriate, Attentive, Sharing and Supportive      Speech:  normal      Thought Process/Content: Logical  Linear      Affective Functioning: Congruent      Mood: anxious and depressed      Level of consciousness:  Alert, Oriented x4 and Attentive      Response to Learning: Able to verbalize current knowledge/experience and Progressing to goal      Endings: None Reported    Modes of Intervention: Education, Support, Socialization, Exploration and Clarifying      Discipline Responsible: /Counselor      Signature:  PATRICIO Hope

## 2019-06-20 ENCOUNTER — HOSPITAL ENCOUNTER (OUTPATIENT)
Dept: PSYCHIATRY | Age: 39
Setting detail: THERAPIES SERIES
Discharge: HOME | End: 2019-06-20
Payer: COMMERCIAL

## 2019-06-20 VITALS
SYSTOLIC BLOOD PRESSURE: 144 MMHG | OXYGEN SATURATION: 100 % | DIASTOLIC BLOOD PRESSURE: 98 MMHG | HEART RATE: 88 BPM | RESPIRATION RATE: 18 BRPM

## 2019-06-20 PROCEDURE — 90853 GROUP PSYCHOTHERAPY: CPT | Performed by: SOCIAL WORKER

## 2019-06-20 NOTE — PLAN OF CARE
Problem: Altered Mood, Depressive Behavior:  Goal: Able to verbalize acceptance of life and situations over which he or she has no control  Description  Able to verbalize acceptance of life and situations over which he or she has no control  Outcome: Ongoing  Note:                                                                       Group Therapy Note    Date: 6/20/2019  Start Time: 0830  End Time:  0945  Number of Participants: 5    Type of Group: Psychotherapy     Patient's Goal: Process emotions and actions in how to relate to others or their relationship with others. Notes:  Pt attended process group as schedule. Pt participated by identified an issue to work on today regarding how they interact and relate to others. Participated in group discussion. Pt gave support and encouragement to group members. Pt processed her focus on her brother's \"perfections\" and how damaging this has been to her self esteem. Status After Intervention:  Improved    Participation Level:  Active Listener and Interactive    Participation Quality: Appropriate, Attentive, Sharing and Supportive      Speech:  normal      Thought Process/Content: Logical  Linear      Affective Functioning: Congruent      Mood: anxious and depressed      Level of consciousness:  Alert, Oriented x4 and Attentive      Response to Learning: Able to verbalize current knowledge/experience and Progressing to goal      Endings: None Reported    Modes of Intervention: Education, Support, Socialization, Exploration and Clarifying      Discipline Responsible: /Counselor      Signature:  PATRICIO Herman

## 2019-06-20 NOTE — PLAN OF CARE
Problem: Anxiety:  Goal: Level of anxiety will decrease  Description  Level of anxiety will decrease  Outcome: Ongoing  Note:                                                                       Group Therapy Note    Date: 6/20/2019  Start Time: 1115  End Time:  7434  Number of Participants: 5    Type of Group: Cognitive Skills    Patient's Goal: Discussion: Reflective Communication. Process emotions and actions in how to relate to others or their relationship with others. Notes:  Pt attended process group as schedule. Pt participated by identified an issue to work on today regarding how they interact and relate to others. Participated in group discussion. Pt gave support and encouragement to group members. Status After Intervention:  Improved    Participation Level:  Active Listener and Interactive    Participation Quality: Appropriate, Attentive, Sharing and Supportive      Speech:  normal      Thought Process/Content: Logical  Linear      Affective Functioning: Congruent      Mood: anxious and depressed      Level of consciousness:  Alert, Oriented x4 and Attentive      Response to Learning: Able to verbalize current knowledge/experience and Progressing to goal      Endings: None Reported    Modes of Intervention: Education, Support, Socialization, Exploration and Clarifying      Discipline Responsible: /Counselor      Signature:  PATRICIO Hancock

## 2019-06-24 ENCOUNTER — HOSPITAL ENCOUNTER (OUTPATIENT)
Dept: PSYCHIATRY | Age: 39
Setting detail: THERAPIES SERIES
Discharge: HOME | End: 2019-06-24
Payer: COMMERCIAL

## 2019-06-24 VITALS
DIASTOLIC BLOOD PRESSURE: 86 MMHG | OXYGEN SATURATION: 98 % | RESPIRATION RATE: 18 BRPM | TEMPERATURE: 98 F | SYSTOLIC BLOOD PRESSURE: 122 MMHG

## 2019-06-24 PROCEDURE — 90853 GROUP PSYCHOTHERAPY: CPT | Performed by: SOCIAL WORKER

## 2019-06-24 NOTE — PLAN OF CARE
Problem: Altered Mood, Depressive Behavior:  Goal: Able to verbalize acceptance of life and situations over which he or she has no control  Description  Able to verbalize acceptance of life and situations over which he or she has no control  6/24/2019 1503 by HARPREET Caballero  Outcome: Ongoing  Note:   Group Therapy Note    Date: 6/24/2019  Start Time: 1000  End Time:  1100  Number of Participants: 8    Type of Group: Relapse Prevention     Patient's Goal:  To improve knowledge of coping skills and be able to provide three coping strategies/skills used in daily life. Notes: Pt attended group as scheduled. Pt demonstrated improved knowledge of coping by participating in group discussion regarding importance of coping skills and different coping strategies used by peers. Pt was able to identify three examples of coping skills used such as; writing in journal, adult coloring, and talking with a friend. Status After Intervention:  Unchanged    Participation Level:  Active Listener and Interactive    Participation Quality: Appropriate and Attentive      Speech:  normal      Thought Process/Content: Logical      Affective Functioning: Congruent      Mood: depressed      Level of consciousness:  Alert, Oriented x4 and Attentive      Response to Learning: Able to verbalize current knowledge/experience and Able to verbalize/acknowledge new learning      Endings: None Reported    Modes of Intervention: Education, Socialization, Exploration and Problem-solving      Discipline Responsible: /Counselor      Signature:  HARPREET Caballero

## 2019-06-24 NOTE — PLAN OF CARE
Problem: Altered Mood, Depressive Behavior:  Goal: STG-Medication therapy compliance  Description  Pt will report any med side effects to staff. Outcome: Ongoing  Note:   D:  Pt here for IOP this am.  Pt reports that she is working on trying to accept that she does not have to be perfect. She reports that her biggest stressor is her work. She reports fair concentration and motivation level. She reports good appetite and fair sleep. She reports med compliance and feels that her meds are helping her. She did not take any Xanax last night. She admits that she continues to use marijuana with last use last noc. A:  support provided. Pt provided opportunity to ask any med/treatment questions. Vandana DILLARD given update on pt this am.  R:  Pt with worried affect at times. She admits that anxiety and feeling overwhelmed continue to be an issues for her. She has been motivated to taper herself off of the Xanax that she has been using and take Buspar to manage her anxiety. She is still adjusting to this transition.

## 2019-06-24 NOTE — PLAN OF CARE
Problem: Altered Mood, Depressive Behavior:  Goal: Able to verbalize acceptance of life and situations over which he or she has no control  Description  Able to verbalize acceptance of life and situations over which he or she has no control  6/24/2019 1503 by HARPREET Oviedo  Outcome: Ongoing  Note:    Group Therapy Note    Date: 6/24/2019  Start Time: 1115  End Time:  1215  Number of Participants: 8    Type of Group: Cognitive Skills    Patient's Goal:  Pt to complete worksheet regarding comfort zones. Pt will be able to identify comfort zone, two advantages and disadvantages of that zone, as well as, a significant change the pt would like to make. Notes:  Pt attended group as scheduled. Pt able to identify comfort zone, two advantages and disadvantages of that zone, as well as, a significant change the pt would like to make. Pt participated in group discussion regarding comfort zones and fears that arise with the thought of making a big change in life, how one might handle the \"worst case scenarios\" and coping skills that may help such as; deep breathing. Pt completed worksheet provided. Status After Intervention:  Unchanged    Participation Level:  Active Listener and Interactive    Participation Quality: Appropriate, Attentive and Sharing      Speech:  normal      Thought Process/Content: Logical      Affective Functioning: Congruent      Mood: depressed      Level of consciousness:  Alert, Oriented x4 and Attentive      Response to Learning: Able to verbalize current knowledge/experience, Able to verbalize/acknowledge new learning and Progressing to goal      Endings: None Reported    Modes of Intervention: Exploration, Clarifying and Activity      Discipline Responsible: /Counselor      Signature:  HARPREET Oviedo

## 2019-06-24 NOTE — PLAN OF CARE
Problem: Altered Mood, Depressive Behavior:  Goal: STG-Medication therapy compliance  Description  Pt will report any med side effects to staff.   6/24/2019 1307 by Val Chan RN  Outcome: Ongoing  Note:   D:  Pt reports that she has not taken a Xanax since last Tuesday. Pt made aware that [address] [place] says she may take the Buspar 15 mg up to 4 times a day if needed for anxiety. Pt verbalized understanding.

## 2019-06-24 NOTE — PLAN OF CARE
Problem: Altered Mood, Depressive Behavior:  Goal: Able to verbalize acceptance of life and situations over which he or she has no control  Description  Able to verbalize acceptance of life and situations over which he or she has no control  Outcome: Ongoing  Note:                                                                       Group Therapy Note    Date: 6/24/2019  Start Time: 830  End Time:  945  Number of Participants: 8    Type of Group: Psychotherapy    Pt Goal: Pt will attend group as scheduled, identify an issue pt would like to work on regarding bettering relationships with others and/or self, pt will be participate in group discussion. Note: Pt attended group as scheduled. Pt participated by identifying an issue pt would like to work on today regarding interacting/relating with others to better relationships. Pt shared that she continues to struggle with learning how to be, \"comfortable and happy with imperfection. \" Pt went on to share that she knows that she cannot \"control everything\" and feels proud of herself for \"starting to see that. \" Pt participated in group discussion exploring issues identified by group members and pt. Status After Intervention:  Unchanged    Participation Level:  Active Listener and Interactive    Participation Quality: Appropriate, Attentive, Sharing and Supportive      Speech:  normal      Thought Process/Content: Logical      Affective Functioning: Congruent      Mood: anxious and depressed      Level of consciousness:  Alert, Oriented x4 and Attentive      Response to Learning: Able to verbalize current knowledge/experience, Able to verbalize/acknowledge new learning, Capable of insight and Progressing to goal      Endings: None Reported    Modes of Intervention: Support, Socialization, Exploration and Clarifying      Discipline Responsible: /Counselor      Signature:  HARPREET Sorto

## 2019-06-26 ENCOUNTER — HOSPITAL ENCOUNTER (OUTPATIENT)
Dept: PSYCHIATRY | Age: 39
Setting detail: THERAPIES SERIES
Discharge: HOME | End: 2019-06-26
Payer: COMMERCIAL

## 2019-06-26 VITALS
TEMPERATURE: 98.8 F | DIASTOLIC BLOOD PRESSURE: 90 MMHG | RESPIRATION RATE: 18 BRPM | OXYGEN SATURATION: 98 % | SYSTOLIC BLOOD PRESSURE: 140 MMHG | HEART RATE: 81 BPM

## 2019-06-26 PROCEDURE — 90853 GROUP PSYCHOTHERAPY: CPT | Performed by: SOCIAL WORKER

## 2019-06-26 NOTE — PLAN OF CARE
Problem: Altered Mood, Depressive Behavior:  Goal: Able to verbalize acceptance of life and situations over which he or she has no control  Description  Able to verbalize acceptance of life and situations over which he or she has no control  6/26/2019 1500 by HARPREET Alvarez  Outcome: Ongoing  Note:                                                                         Group Therapy Note    Date: 6/26/2019  Start Time: 1115  End Time:  1215  Number of Participants: 7    Type of Group: Cognitive Skills      Pt's Goal: Pt to be able to practice self-flection, identify difficult moments that the pt made it through and what may have helped pt to do so. Handout: You Did. Notes: Pt attended group as scheduled. Pt showed improve knowledge regarding self-reflection by being able to identify difficult moments that the pt has endured throughout life as well as, joined in group discussion regarding what the pt felt was beneficial to making it through those difficulties. Pt was provided handout: You Did. Status After Intervention:  Unchanged    Participation Level:  Active Listener and Interactive    Participation Quality: Appropriate, Attentive and Sharing      Speech:  normal      Thought Process/Content: Logical      Affective Functioning: Congruent      Mood: depressed      Level of consciousness:  Alert, Oriented x4 and Attentive      Response to Learning: Able to verbalize current knowledge/experience and Able to verbalize/acknowledge new learning      Endings: None Reported    Modes of Intervention: Support, Socialization, Exploration and Clarifying      Discipline Responsible: /Counselor      Signature:  HARPREET Alvarez

## 2019-06-26 NOTE — BH NOTE
D:  Pt attended IOP this am.  B/P 140/98 P=81 at 11:00. Pt reported that she had just been out to smoke. Discussed the dangers of smoking especially with her hx of MI. Pt says that she is aware. Pt says that she has increased her B/P meds back up to what she was taking in the past.  Pt was encouraged to discuss her B/P and meds with her PCP. \"He told me in the past I could stop the B/P med but I kept taking it at half the dose and I just recently went back up to the full dose\". Pt again encouraged to discuss with her PCP.

## 2019-06-26 NOTE — PLAN OF CARE
Problem: Altered Mood, Depressive Behavior:  Goal: Able to verbalize acceptance of life and situations over which he or she has no control  Description  Able to verbalize acceptance of life and situations over which he or she has no control  Outcome: Ongoing  Note:                                                                       Group Therapy Note    Date: 6/26/2019  Start Time: 830  End Time:  945  Number of Participants: 8    Type of Group: Psychotherapy  Pt Goal: Pt will attend group as scheduled, identify an issue pt would like to work on regarding bettering relationships with others and/or self, pt will be participate in group discussion. Note: Pt attended group as scheduled. Pt participated by identifying an issue pt would like to work on today regarding interacting/relating with others to better relationships. Pt shared that her biggest stressor is \"work\" and \"trying to control my emotions, I just get so worked up sometimes about how things go or are handled. \" Group discussed not being able to control actions of others however, being in control of how the self reacts to situations. Pt received support and encouragement from peers. Pt participated in group discussion exploring issues identified by group members and pt. Status After Intervention:  Unchanged    Participation Level:  Active Listener and Interactive    Participation Quality: Appropriate, Attentive, Sharing and Supportive      Speech:  normal      Thought Process/Content: Logical      Affective Functioning: Congruent      Mood: depressed      Level of consciousness:  Alert, Oriented x4 and Attentive      Response to Learning: Able to verbalize current knowledge/experience, Able to verbalize/acknowledge new learning, Able to retain information and Progressing to goal      Endings: None Reported    Modes of Intervention: Support, Socialization, Exploration and Clarifying      Discipline Responsible: Social Worker/Counselor      Signature:  HARPREET Fernandez

## 2019-06-26 NOTE — PLAN OF CARE
Problem: Altered Mood, Depressive Behavior:  Goal: Able to verbalize acceptance of life and situations over which he or she has no control  Description  Able to verbalize acceptance of life and situations over which he or she has no control  6/26/2019 1459 by HARPREET Salinas  Outcome: Ongoing  Note:                                                                       Group Therapy Note    Date: 6/26/2019  Start Time: 1000  End Time:  1100  Number of Participants: 8    Type of Group: Relapse Prevention     Patient's Goal:  Pt will be able to identify negative automatic thoughts, what triggered the thought, and replace them with new rational thoughts to improve mood. Notes: Pt attended group as scheduled. Pt demonstrated improved knowledge of replacing automatic negative thoughts with a new rational thought to improve mood by actively participating in group discussion. Pt able to identify trigger that led to negative thought. Pt received support and encouragement from group. Status After Intervention:  Unchanged    Participation Level:  Active Listener and Interactive    Participation Quality: Appropriate, Attentive, Sharing and Supportive      Speech:  normal      Thought Process/Content: Logical      Affective Functioning: Congruent      Mood: depressed      Level of consciousness:  Alert, Oriented x4 and Attentive      Response to Learning: Able to verbalize current knowledge/experience, Able to verbalize/acknowledge new learning, Able to retain information and Progressing to goal      Endings: None Reported    Modes of Intervention: Education, Support, Exploration, Clarifying and Problem-solving      Discipline Responsible: /Counselor      Signature:  HARPREET Salinas

## 2019-06-26 NOTE — PROGRESS NOTES
ADMINISTRATIVE NOTE:  Reviewed and approved group notes authored by Demond fall. We discussed progress of this patient and group issues and participation level.   Electronically signed by Bushra Mckenzie on 6/26/2019 at 8:24 AM

## 2019-06-28 ENCOUNTER — HOSPITAL ENCOUNTER (OUTPATIENT)
Dept: PSYCHIATRY | Age: 39
Setting detail: THERAPIES SERIES
Discharge: HOME | End: 2019-06-28
Payer: COMMERCIAL

## 2019-06-28 VITALS
RESPIRATION RATE: 18 BRPM | HEART RATE: 85 BPM | SYSTOLIC BLOOD PRESSURE: 123 MMHG | OXYGEN SATURATION: 98 % | DIASTOLIC BLOOD PRESSURE: 88 MMHG

## 2019-06-28 PROCEDURE — 90853 GROUP PSYCHOTHERAPY: CPT | Performed by: SOCIAL WORKER

## 2019-06-28 NOTE — PLAN OF CARE
Problem: Altered Mood, Depressive Behavior:  Goal: Able to verbalize acceptance of life and situations over which he or she has no control  Description  Able to verbalize acceptance of life and situations over which he or she has no control  6/28/2019 1502 by HARPREET Smallwood  Outcome: Ongoing  Note:                                                                       Group Therapy Note    Date: 6/28/2019  Start Time: 1000  End Time:  1100  Number of Participants: 8    Type of Group: Relapse Prevention     Patient's Goal:  To improve knowledge of coping skills and be able to provide three coping strategies/skills used in daily life. Notes: Pt attended group as scheduled. Pt demonstrated improved knowledge of coping by participating in group discussion regarding importance of coping skills and different coping strategies used by peers. Pt was able to identify examples of coping skills used such as; writing in journal, adult coloring, attending support groups, finding a hobby, and reaching out to a friend. Status After Intervention:  Unchanged    Participation Level:  Active Listener and Interactive    Participation Quality: Appropriate, Attentive and Sharing      Speech:  normal      Thought Process/Content: Logical      Affective Functioning: Congruent      Mood: depressed      Level of consciousness:  Alert, Oriented x4 and Attentive      Response to Learning: Able to verbalize current knowledge/experience, Able to verbalize/acknowledge new learning, Able to retain information, and Progressing to goal      Endings: None Reported    Modes of Intervention: Education, Exploration, Clarifying and Problem-solving      Discipline Responsible: /Counselor      Signature:  HARPREET Smallwood

## 2019-06-28 NOTE — PLAN OF CARE
Problem: Altered Mood, Depressive Behavior:  Goal: Able to verbalize and/or display a decrease in depressive symptoms  Description  Able to verbalize and/or display a decrease in depressive symptoms  Outcome: Ongoing  Note:                                                                       Group Therapy Note    Date: 6/28/2019  Start Time: 1115  End Time:  1215  Number of Participants: 8    Type of Group: Cognitive Skills    Patient's Goal: Pt to able to discuss benefits of keeping a gratitude journal, identify at least two benefits, and practice journaling. Notes: Pt attended group as scheduled. Pt showed improved knowledge of benefits of keeping a gratitude journal and identified at least two reasons to journal, such as; reducing stress, improving self-esteem/feelings of self worth, and increasing happiness. Pt joined group discussion regarding journaling tips and prompts. Pt participated in activity provided; gratitude journal by completing one entry. Status After Intervention:  unchanged    Participation Level:  Active Listener and Interactive    Participation Quality: Appropriate, Attentive,     Speech:  normal      Thought Process/Content: Logical      Affective Functioning: Congruent      Mood: depressed      Level of consciousness:  Alert, Oriented x4 and Attentive      Response to Learning: Able to verbalize current knowledge/experience, Able to verbalize/acknowledge new learning,       Endings: None Reported    Modes of Intervention: Socialization, Exploration, Clarifying, Problem-solving and Activity      Discipline Responsible: /Counselor      Signature:  HARPREET Villa

## 2019-06-28 NOTE — PROGRESS NOTES
ADMINISTRATIVE NOTE:  Reviewed and approved group notes authored by Demond fall. We discussed progress of this patient and group issues and participation level.   Electronically signed by PATRICIO Retana on 6/28/2019 at 9:09 AM

## 2019-06-28 NOTE — PLAN OF CARE
Problem: Altered Mood, Depressive Behavior:  Goal: STG-Medication therapy compliance  Description  Pt will report any med side effects to staff. Outcome: Ongoing  Note:   D:  Pt here for IOP this am.  She reports that she \"hopes I don't stay in the bed all weekend\". Pt admits that she is having difficulty being motivated when at home and is staying in bed a lot. Pt says overall that her anxiety is improving but continues to have periods of high anxiety. She states that she got so anxious at work this week that she started crying. Pt reports that she has completed tapering herself off of the Xanax and has not had any in 11 days. Pt says she is weaning herself off of the Marijuana. \"I am down to 1 bowl at night. Pt reports fair concentration and appetite. She reports fair sleep. She reports med compliance and feels that her meds are working. She denies any thoughts to harm herself or others. A:  support provided. Discussed ways to structure herself over the weekend so that she does not stay in the bed. Pt appears pleased with her ability to stop the Xanax use. The Buspar has been increased to 4 times a day as needed.

## 2019-06-28 NOTE — PROGRESS NOTES
ADMINISTRATIVE NOTE:  Reviewed and approved group notes authored by Demond fall. We discussed progress of this patient and group issues and participation level.   Electronically signed by PATRICIO Henson on 6/28/2019 at 3:35 PM

## 2019-07-02 ENCOUNTER — HOSPITAL ENCOUNTER (OUTPATIENT)
Dept: PSYCHIATRY | Age: 39
Setting detail: THERAPIES SERIES
Discharge: HOME | End: 2019-07-02
Payer: COMMERCIAL

## 2019-07-02 VITALS
RESPIRATION RATE: 18 BRPM | TEMPERATURE: 98.8 F | OXYGEN SATURATION: 99 % | DIASTOLIC BLOOD PRESSURE: 89 MMHG | HEART RATE: 74 BPM | SYSTOLIC BLOOD PRESSURE: 129 MMHG

## 2019-07-02 PROCEDURE — 90853 GROUP PSYCHOTHERAPY: CPT | Performed by: SOCIAL WORKER

## 2019-07-02 NOTE — PLAN OF CARE
Problem: Altered Mood, Depressive Behavior:  Goal: Able to verbalize acceptance of life and situations over which he or she has no control  Description  Able to verbalize acceptance of life and situations over which he or she has no control  7/2/2019 1457 by HARPREET Braun  Outcome: Ongoing  Note:                                                                         Group Therapy Note    Date: 7/2/2019  Start Time: 1115  End Time:  1215  Number of Participants: 3    Type of Group: Cognitive Skills    Patient Goal: To improve knowledge of guided imagery/visualization technique used to help reduce; anxiety, stress, and physical/emotional pains. Notes: Pt attended group as scheduled. Pt showed improved knowledge of coping skill; guided imagery/visualization technique as a tool to assist with reducing anxiety, stress, and physical/emotional pains by joining discussion and actively participating in activity provided; media-[place], guided imagery. Pt also completed one entry in gratitude journal at end of group session. Status After Intervention:  Unchanged    Participation Level:  Active Listener and Interactive    Participation Quality: Appropriate, Attentive and Sharing      Speech:  normal      Thought Process/Content: Logical      Affective Functioning: Congruent      Mood: depressed      Level of consciousness:  Alert, Oriented x4 and Attentive      Response to Learning: Able to verbalize current knowledge/experience, Able to verbalize/acknowledge new learning, Able to retain information and Progressing to goal      Endings: None Reported    Modes of Intervention: Education, Exploration, Problem-solving, Activity and Media      Discipline Responsible: /Counselor    Signature:  HARPREET Braun

## 2019-07-02 NOTE — PLAN OF CARE
Problem: Altered Mood, Depressive Behavior:  Goal: Able to verbalize acceptance of life and situations over which he or she has no control  Description  Able to verbalize acceptance of life and situations over which he or she has no control  Outcome: Ongoing  Note:                                                                       Group Therapy Note    Date: 7/2/2019  Start Time: 830  End Time:  945  Number of Participants: 5    Type of Group: Psychotherapy    Pt Goal: Pt will attend group as scheduled, identify an issue pt would like to work on regarding bettering relationships with others and/or self, pt will be participate in group discussion. Note: Pt attended group as scheduled. Pt participated by identifying an issue pt would like to work on today regarding interacting/relating with others to better relationships. Pt shared with group that she struggles with feelings of imperfection in her relationships with others and self. Group discussed allowing oneself to acknowledge that he/she does not have control over \"everything\" and that it is \"okay to just be okay. \" Pt reported that she plans to begin journaling more to observe patterns in her moods. Pt received support and encouragement from group today as well as provided others with support. Pt participated in group discussion exploring issues identified by group members and pt. Status After Intervention:  Unchanged    Participation Level:  Active Listener and Interactive    Participation Quality: Appropriate, Attentive, Sharing and Supportive      Speech:  normal      Thought Process/Content: Logical      Affective Functioning: Congruent      Mood: depressed      Level of consciousness:  Alert, Oriented x4 and Attentive      Response to Learning: Able to verbalize current knowledge/experience, Able to verbalize/acknowledge new learning, Capable of insight and Progressing to goal      Endings: None Reported    Modes of Intervention: Support, Socialization, Exploration and Clarifying      Discipline Responsible: /Counselor      Signature:  HARPREET Dudley

## 2019-07-02 NOTE — PLAN OF CARE
Problem: Altered Mood, Depressive Behavior:  Goal: Able to verbalize acceptance of life and situations over which he or she has no control  Description  Able to verbalize acceptance of life and situations over which he or she has no control  7/2/2019 1456 by HARPREET Ceron  Outcome: Ongoing  Note:   Group Therapy Note     Date: 7/2/2019  Start Time: 1000  End Time:  1100  Number of Participants: 5     Type of Group: Relapse Prevention     Patient's Goal:  To improve knowledge of coping skills and be able to provide coping strategies/skills used in daily life. Notes: Pt attended group as scheduled. Pt demonstrated improved knowledge of coping by participating in group discussion regarding importance of coping skills and different coping strategies used by peers. Pt was able to identify examples of coping skills used in daily life that is found helpful such as; reading, adult coloring, attending support groups, and talking with a friend. Status After Intervention:  Unchanged     Participation Level:  Active Listener and Interactive     Participation Quality: Appropriate, Attentive, Sharing and Supportive        Speech:  normal        Thought Process/Content: Logical        Affective Functioning: Congruent        Mood: depressed        Level of consciousness:  Alert, Oriented x4 and Attentive        Response to Learning: Able to verbalize current knowledge/experience and Able to verbalize/acknowledge new learning        Endings: None Reported     Modes of Intervention: Socialization, Exploration and Clarifying        Discipline Responsible: /Counselor        Signature:  HARPREET Ceron

## 2019-07-03 ENCOUNTER — HOSPITAL ENCOUNTER (OUTPATIENT)
Dept: PSYCHIATRY | Age: 39
Setting detail: THERAPIES SERIES
Discharge: HOME | End: 2019-07-03
Payer: COMMERCIAL

## 2019-07-03 PROCEDURE — 90853 GROUP PSYCHOTHERAPY: CPT | Performed by: SOCIAL WORKER

## 2019-07-03 NOTE — PLAN OF CARE
Problem: Altered Mood, Depressive Behavior:  Goal: Able to verbalize and/or display a decrease in depressive symptoms  Description  Able to verbalize and/or display a decrease in depressive symptoms  Outcome: Ongoing  Note:                                                                       Group Therapy Note    Date: 7/3/2019  Start Time: 1000  End Time:  1100  Number of Participants: 5    Type of Group: Psychoeducation      Patient's Goal:  Pt to increase knowledge of depression and participate in activity provided; Depression Jeopardy     Notes:  Pt attended group as discussed. Pt show improved knowledge of depression signs/symptoms, causes, medication therapies provided, and coping skills by actively participating in group discussion. Pt also participated in activity provided; Depression Jeopardy. Status After Intervention:  Unchanged    Participation Level:  Active Listener and Interactive    Participation Quality: Appropriate, Attentive and Sharing      Speech:  normal      Thought Process/Content: Logical      Affective Functioning: Congruent      Mood: depressed      Level of consciousness:  Alert, Oriented x4 and Attentive      Response to Learning: Able to verbalize current knowledge/experience, Able to verbalize/acknowledge new learning, Able to retain information and Progressing to goal      Endings: None Reported    Modes of Intervention: Education, Socialization, Exploration and Activity      Discipline Responsible: /Counselor      Signature:  HARPREET Travis
Clarifying      Discipline Responsible: /Counselor      Signature:  HARPREET Espino

## 2019-07-08 ENCOUNTER — HOSPITAL ENCOUNTER (OUTPATIENT)
Dept: PSYCHIATRY | Age: 39
Setting detail: THERAPIES SERIES
Discharge: HOME | End: 2019-07-08
Payer: COMMERCIAL

## 2019-07-08 PROCEDURE — 90853 GROUP PSYCHOTHERAPY: CPT | Performed by: SOCIAL WORKER

## 2019-07-08 NOTE — PLAN OF CARE
Problem: Altered Mood, Depressive Behavior:  Goal: STG-Medication therapy compliance  Description  Pt will report any med side effects to staff. Outcome: Ongoing  Note:   D:  Pt here for IOP this am.  She reports a moderate level of depression and low level of anxiety. She reports good appetite and sleep. She reports med compliance and feels that they are helpful. She denies any thoughts to harm herself or others. A:  support provided. Pt provided opportunity to ask any med/treatment questions. R:  Pt with sad affect that brightens when she interacts with others. She has some anxiety related to leaving the support of the group when discharged later this week. Pt has been encouraged to attend [facility] and [facility].

## 2019-07-10 ENCOUNTER — HOSPITAL ENCOUNTER (OUTPATIENT)
Dept: PSYCHIATRY | Age: 39
Setting detail: THERAPIES SERIES
Discharge: HOME | End: 2019-07-10
Payer: COMMERCIAL

## 2019-07-10 PROCEDURE — 90853 GROUP PSYCHOTHERAPY: CPT | Performed by: SOCIAL WORKER

## 2019-07-12 ENCOUNTER — HOSPITAL ENCOUNTER (OUTPATIENT)
Dept: PSYCHIATRY | Age: 39
Setting detail: THERAPIES SERIES
Discharge: HOME | End: 2019-07-12
Payer: COMMERCIAL

## 2019-07-12 VITALS
OXYGEN SATURATION: 95 % | BODY MASS INDEX: 36.28 KG/M2 | DIASTOLIC BLOOD PRESSURE: 81 MMHG | WEIGHT: 218 LBS | TEMPERATURE: 97.7 F | HEART RATE: 89 BPM | RESPIRATION RATE: 18 BRPM | SYSTOLIC BLOOD PRESSURE: 125 MMHG

## 2019-07-12 DIAGNOSIS — F41.1 GENERALIZED ANXIETY DISORDER: ICD-10-CM

## 2019-07-12 DIAGNOSIS — F33.0 MILD EPISODE OF RECURRENT MAJOR DEPRESSIVE DISORDER (HCC): ICD-10-CM

## 2019-07-12 PROCEDURE — 90853 GROUP PSYCHOTHERAPY: CPT | Performed by: SOCIAL WORKER

## 2019-07-12 RX ORDER — VENLAFAXINE HYDROCHLORIDE 75 MG/1
75 CAPSULE, EXTENDED RELEASE ORAL DAILY
Qty: 30 CAPSULE | Refills: 3 | Status: SHIPPED | OUTPATIENT
Start: 2019-07-12 | End: 2019-10-30 | Stop reason: SDUPTHER

## 2019-07-12 RX ORDER — VENLAFAXINE HYDROCHLORIDE 150 MG/1
150 CAPSULE, EXTENDED RELEASE ORAL DAILY
Qty: 30 CAPSULE | Refills: 3 | Status: SHIPPED | OUTPATIENT
Start: 2019-07-12 | End: 2019-10-30 | Stop reason: SDUPTHER

## 2019-07-12 RX ORDER — BUSPIRONE HYDROCHLORIDE 10 MG/1
20 TABLET ORAL 3 TIMES DAILY
Qty: 180 TABLET | Refills: 3 | Status: SHIPPED | OUTPATIENT
Start: 2019-07-12 | End: 2019-12-05 | Stop reason: SDUPTHER

## 2019-07-12 NOTE — PROGRESS NOTES
Discharge Note     Pt discharging on this date from the [facility] Intensive Outpatient Group Therapy Program. Pt denies SI, HI, and AVH at this time. Pt reports improvement in behavior and is leaving program in overall good condition. LCSW and pt discussed pt's follow up appointments and importance of attending appointments as scheduled, pt voiced understanding and agreement. Pt and LCSW also discussed pt safety plan and pt able to verbally identify: warning signs, coping strategies, places and people that help make the pt feel better/distract negative thoughts, friends/family/agencies/professionals the pt can reach out to in a crisis, and something that is important to the pt/worth living for. Pt provided the national suicide prevention hotline number (1-499-284-211.729.6411) as well as local community behavioral health [facility] crisis number for emergencies (6-588-853-1415).

## 2019-07-12 NOTE — DISCHARGE SUMMARY
7/12/2019 11:57 AM   Progress Note    IN:  1130  OUT: [address] 1980      Chief Complaint   Patient presents with    Anxiety         Subjective:  Patient is a 45 y.o. female diagnosed with MDD, moderate/CANDE and presents today for follow-up. Last seen in clinic on 6/5/19 and prior records were reviewed. Today patient states, \"I have really gotten good benefit from the group therapy. \" She reports that the program has been good for her and that she has learned coping skills and has been using them. She is sad that the program is closing. She reports that she has been able to stop using Xanax and that it has been 25 days since she stopped. She reports that she did have a day of withdrawal symptoms but that she got over that and is fine now. Patient reports side effects as follows: none. No evidence of EPS, no cogwheeling or abnormal motor movements. Absent  suicidal ideation. Reports compliance with medications as good .      Current Substance Use:  See history    BP: /81   Pulse 89   Temp 97.7 °F (36.5 °C) (Tympanic)   Resp 18   Wt 218 lb (98.9 kg)   SpO2 95%   BMI 36.28 kg/m²       Review of Systems - 14 point review:  Negative except for: psoriasis, hx of MI, HTN      Constitutional: (fevers, chills, night sweats, wt loss/gain, change in appetite, fatigue, somnolence)    HEENT: (ear pain or discharge, hearing loss, ear ringing, sinus pressure, nosebleed, nasal discharge, sore throat, oral sores, tooth pain, bleeding gums, hoarse voice, neck pain)      Cardiovascular: (HTN, chest pain, elevated cholesterol/lipids, palpitations, leg swelling, leg pain with walking)    Respiratory: (cough, wheezing, snoring, SOB with activity (dyspnea), SOB while lying flat (orthopnea), awakening with severe SOB (paroxysmal nocturnal dyspnea))    Gastrointestinal: (NVD, constipation, abdominal pain, bright red stools, black tarry stools, stool incontinence)     Genitourinary:  (pelvic pain, burning or frequency of urination, urinary urgency, blood in urine incomplete bladder emptying, urinary incontinence, STD; MEN: testicular pain or swelling, erectile dysfunction; WOMEN: LMP, heavy menstrual bleeding (menorrhagia), irregular periods, postmenopausal bleeding, menstrual pain (dymenorrhea, vaginal discharge)    Musculoskeletal: (bone pain/fracture, joint pain or swelling, musle pain)    Integumentary: (rashes, acne, non-healing sores, itching, breast lumps, breast pain, nipple discharge, hair loss)    Neurologic: (HA, muscle weakness, paresthesias (numbness, coldness, crawling or prickling), memory loss, seizure, dizziness)    Psychiatric:  (anxiety, sadness, irritability/anger, insomnia, suicidality)    Endocrine: (heat or cold intolerance, excessive thirst (polydipsia), excessive hunger (polyphagia))    Immune/Allergic: (hives, seasonal or environmental allergies, HIV exposure)    Hematologic/Lymphatic: (lymph node enlargement, easy bleeding or bruising)    History obtained via chart review and patient    PCP is Paula Ward, DO       Current Meds:    Prior to Admission medications    Medication Sig Start Date End Date Taking?  Authorizing Provider   busPIRone (BUSPAR) 10 MG tablet Take 2 tablets by mouth 3 times daily 7/12/19  Yes GILMA Perez NP   venlafaxine (EFFEXOR XR) 150 MG extended release capsule Take 1 capsule by mouth daily 7/12/19  Yes GILMA Perez NP   venlafaxine (EFFEXOR XR) 75 MG extended release capsule Take 1 capsule by mouth daily 7/12/19  Yes GILMA Perez NP   adalimumab (HUMIRA) 40 MG/0.8ML injection Inject 40 mg into the skin every 14 days Indications: Psoriasis    Historical Provider, MD   cloNIDine (CATAPRES) 0.1 MG tablet Take 0.1 mg by mouth daily as needed for High Blood Pressure (for elevated blood pressure.)  4/4/19   Historical Provider, MD   metoprolol tartrate (LOPRESSOR) 25 MG tablet 25 mg nightly  4/1/19   Historical Provider, MD   atorvastatin

## 2019-07-15 ENCOUNTER — FOLLOWUP TELEPHONE ENCOUNTER (OUTPATIENT)
Dept: PSYCHIATRY | Age: 39
End: 2019-07-15

## 2019-07-23 ENCOUNTER — OFFICE VISIT (OUTPATIENT)
Dept: PSYCHIATRY | Age: 39
End: 2019-07-23
Payer: COMMERCIAL

## 2019-07-23 VITALS
BODY MASS INDEX: 36.78 KG/M2 | SYSTOLIC BLOOD PRESSURE: 134 MMHG | HEART RATE: 75 BPM | DIASTOLIC BLOOD PRESSURE: 80 MMHG | WEIGHT: 221 LBS

## 2019-07-23 DIAGNOSIS — F41.1 GENERALIZED ANXIETY DISORDER: ICD-10-CM

## 2019-07-23 DIAGNOSIS — F33.0 MILD EPISODE OF RECURRENT MAJOR DEPRESSIVE DISORDER (HCC): Primary | ICD-10-CM

## 2019-07-23 PROCEDURE — 99213 OFFICE O/P EST LOW 20 MIN: CPT | Performed by: NURSE PRACTITIONER

## 2019-07-23 NOTE — PATIENT INSTRUCTIONS
Plan:  Continue:  Effexor XR, 225mg, daily  Buspirone, 20mg, three times daily      Follow up: Return in about 3 months (around 10/23/2019).

## 2019-07-30 ENCOUNTER — OFFICE VISIT (OUTPATIENT)
Dept: PSYCHIATRY | Age: 39
End: 2019-07-30
Payer: COMMERCIAL

## 2019-07-30 DIAGNOSIS — F33.0 MDD (MAJOR DEPRESSIVE DISORDER), RECURRENT EPISODE, MILD (HCC): Primary | ICD-10-CM

## 2019-07-30 PROCEDURE — 90837 PSYTX W PT 60 MINUTES: CPT | Performed by: SOCIAL WORKER

## 2019-08-13 ENCOUNTER — OFFICE VISIT (OUTPATIENT)
Dept: PSYCHIATRY | Age: 39
End: 2019-08-13
Payer: COMMERCIAL

## 2019-08-13 DIAGNOSIS — F33.0 MDD (MAJOR DEPRESSIVE DISORDER), RECURRENT EPISODE, MILD (HCC): Primary | ICD-10-CM

## 2019-08-13 PROCEDURE — 90837 PSYTX W PT 60 MINUTES: CPT | Performed by: SOCIAL WORKER

## 2019-08-28 ENCOUNTER — OFFICE VISIT (OUTPATIENT)
Dept: PSYCHIATRY | Age: 39
End: 2019-08-28
Payer: COMMERCIAL

## 2019-08-28 DIAGNOSIS — F33.0 MILD EPISODE OF RECURRENT MAJOR DEPRESSIVE DISORDER (HCC): Primary | ICD-10-CM

## 2019-08-28 PROCEDURE — 90837 PSYTX W PT 60 MINUTES: CPT | Performed by: SOCIAL WORKER

## 2019-08-28 NOTE — PROGRESS NOTES
Therapy Progress Note  Pb Davis MSW, LCSW  8/28/2019  3:02 PM  4:10 PM      Time spent with Patient: 68 minutes  This is patient's third  Therapy appointment. Reason for Consult:  anxiety  Referring Provider: No referring provider defined for this encounter. Pieter Banda ,a 45 y.o. female, for initial evaluation visit. Pt provided informed consent for the behavioral health program. Discussed with patient model of service to include the limits of confidentiality (i.e. abuse reporting, suicide intervention, etc.) and short-term intervention focused approach. Discussed no show and late cancellation policy. Pt indicated understanding. S:  Pt reported she was able to passively remove herself out of a scary relationships, she has decided to move on from a long term flirty relationship, and she is working toward a relationship with a  from [employer]. Pt reported the progress she has made in her home with cleaning and organizing, conversation with her daughter about sex and birth control, and relationship with her son. Pt shared the frustrations and difficulties with work relationships and building her a support network. Pt denies Suicidal Ideations, Homicidal Ideation, Auditory Hallucinations, Visual Hallucinations, Tactical Hallucinations.         MSE:    Appearance    alert, cooperative, no distress  Appetite normal  Sleep disturbance No, two hours of sleep at night, sometimes its 4 hours- since 7/31/19 appointment with GILMA  Fatigue No  Loss of pleasure No  Impulsive behavior No  Speech    normal rate and normal volume  Mood    Within Normal Limits  Affect    normal affect  Thought Content    intact  Thought Process    goal directed  Associations    logical connections  Insight    Good  Judgment    Intact  Orientation    oriented to person, place, time, and general circumstances  Memory    recent and remote memory intact  Attention/Concentration    intact  Morbid ideation No  Suicide Assessment no suicidal ideation      History:  Social History     Socioeconomic History    Marital status: Single     Spouse name: Not on file    Number of children: 2    Years of education: assoc degree    Highest education level: Not on file   Occupational History    Not on file   Social Needs    Financial resource strain: Not on file    Food insecurity:     Worry: Not on file     Inability: Not on file    Transportation needs:     Medical: Not on file     Non-medical: Not on file   Tobacco Use    Smoking status: Current Every Day Smoker     Packs/day: 1.00     Start date: 1996    Smokeless tobacco: Never Used    Tobacco comment: 6/5/19 pt given in on smoking and smoking cessation. Substance and Sexual Activity    Alcohol use: Yes     Alcohol/week: 2.0 standard drinks     Types: 1 Glasses of wine, 1 Shots of liquor per week     Frequency: Monthly or less     Drinks per session: 1 or 2     Comment: \"I used to drink a lot. I drink 2 to 3 drinks a month now\".  Drug use: Yes     Types: Marijuana     Comment: uses marijuana every day, stopped Xanax 6/17/19.      Sexual activity: Not Currently   Lifestyle    Physical activity:     Days per week: Not on file     Minutes per session: Not on file    Stress: Not on file   Relationships    Social connections:     Talks on phone: Not on file     Gets together: Not on file     Attends Sabianist service: Not on file     Active member of club or organization: Not on file     Attends meetings of clubs or organizations: Not on file     Relationship status: Not on file    Intimate partner violence:     Fear of current or ex partner: Not on file     Emotionally abused: Not on file     Physically abused: Not on file     Forced sexual activity: Not on file   Other Topics Concern    Not on file   Social History Narrative    PREVIOUS MEDICATION TRIALS    Ambien       Medications:   Current Outpatient Medications   Medication Sig Dispense Refill    busPIRone (BUSPAR) 10 Types: Marijuana     Comment: uses marijuana every day, stopped Xanax 6/17/19.  Sexual activity: Not Currently   Lifestyle    Physical activity:     Days per week: Not on file     Minutes per session: Not on file    Stress: Not on file   Relationships    Social connections:     Talks on phone: Not on file     Gets together: Not on file     Attends Jehovah's witness service: Not on file     Active member of club or organization: Not on file     Attends meetings of clubs or organizations: Not on file     Relationship status: Not on file    Intimate partner violence:     Fear of current or ex partner: Not on file     Emotionally abused: Not on file     Physically abused: Not on file     Forced sexual activity: Not on file   Other Topics Concern    Not on file   Social History Narrative    PREVIOUS MEDICATION TRIALS    Ambien       TOBACCO:   reports that she has been smoking. She started smoking about 23 years ago. She has been smoking about 1.00 pack per day. She has never used smokeless tobacco.  ETOH:   reports that she drinks about 2.0 standard drinks of alcohol per week. Family History:   Family History   Problem Relation Age of Onset    High Blood Pressure Mother     Depression Mother     Anxiety Disorder Mother     Heart Attack Father     Heart Disease Father     High Cholesterol Father        Diagnosis:    Major depressive disorder; recurrent and mild      Diagnosis Date    CAD (coronary artery disease)     MI about 1 1/2 yrs ago    CHF (congestive heart failure) (HCC)     Hyperlipidemia     Hypertension     kidney stones     VINOD (obstructive sleep apnea)     uses CPAP    Psoriasis     Psychiatric problem     hx of depression and anxiety since a teenager. Problems related to the social environment and Other psychosocial and environmental problems    Plan:  1. Continue medication management  2. CBT to target cognitive distortions  3. Discuss therapeutic goals  4.  Assertive

## 2019-09-05 ENCOUNTER — TELEPHONE (OUTPATIENT)
Dept: PSYCHIATRY | Age: 39
End: 2019-09-05

## 2019-09-05 RX ORDER — DOXEPIN HYDROCHLORIDE 10 MG/1
10 CAPSULE ORAL NIGHTLY
Qty: 30 CAPSULE | Refills: 3 | Status: SHIPPED | OUTPATIENT
Start: 2019-09-05 | End: 2019-12-20

## 2019-09-23 RX ORDER — TICAGRELOR 90 MG/1
TABLET ORAL
Qty: 60 TABLET | Refills: 11 | Status: SHIPPED | OUTPATIENT
Start: 2019-09-23 | End: 2021-05-19

## 2019-09-23 RX ORDER — ATORVASTATIN CALCIUM 80 MG/1
TABLET, FILM COATED ORAL
Qty: 30 TABLET | Refills: 11 | Status: SHIPPED | OUTPATIENT
Start: 2019-09-23 | End: 2020-08-31 | Stop reason: SDUPTHER

## 2019-10-03 ENCOUNTER — TELEPHONE (OUTPATIENT)
Dept: PSYCHIATRY | Age: 39
End: 2019-10-03

## 2019-10-30 RX ORDER — VENLAFAXINE HYDROCHLORIDE 150 MG/1
150 CAPSULE, EXTENDED RELEASE ORAL DAILY
Qty: 30 CAPSULE | Refills: 3 | Status: SHIPPED | OUTPATIENT
Start: 2019-10-30 | End: 2020-03-05 | Stop reason: SDUPTHER

## 2019-10-30 RX ORDER — VENLAFAXINE HYDROCHLORIDE 75 MG/1
75 CAPSULE, EXTENDED RELEASE ORAL DAILY
Qty: 30 CAPSULE | Refills: 3 | Status: SHIPPED | OUTPATIENT
Start: 2019-10-30 | End: 2020-07-31

## 2019-12-05 ENCOUNTER — OFFICE VISIT (OUTPATIENT)
Dept: PSYCHIATRY | Age: 39
End: 2019-12-05
Payer: COMMERCIAL

## 2019-12-05 VITALS
TEMPERATURE: 97.5 F | SYSTOLIC BLOOD PRESSURE: 116 MMHG | WEIGHT: 223 LBS | HEIGHT: 65 IN | OXYGEN SATURATION: 96 % | HEART RATE: 88 BPM | DIASTOLIC BLOOD PRESSURE: 78 MMHG | BODY MASS INDEX: 37.15 KG/M2

## 2019-12-05 DIAGNOSIS — F41.0 GENERALIZED ANXIETY DISORDER WITH PANIC ATTACKS: ICD-10-CM

## 2019-12-05 DIAGNOSIS — F51.05 INSOMNIA DUE TO OTHER MENTAL DISORDER: ICD-10-CM

## 2019-12-05 DIAGNOSIS — F99 INSOMNIA DUE TO OTHER MENTAL DISORDER: ICD-10-CM

## 2019-12-05 DIAGNOSIS — R41.840 POOR CONCENTRATION: ICD-10-CM

## 2019-12-05 DIAGNOSIS — F41.1 GENERALIZED ANXIETY DISORDER WITH PANIC ATTACKS: ICD-10-CM

## 2019-12-05 DIAGNOSIS — F33.1 MODERATE EPISODE OF RECURRENT MAJOR DEPRESSIVE DISORDER (HCC): Primary | ICD-10-CM

## 2019-12-05 PROCEDURE — 99214 OFFICE O/P EST MOD 30 MIN: CPT | Performed by: NURSE PRACTITIONER

## 2019-12-05 RX ORDER — CLONIDINE HYDROCHLORIDE 0.1 MG/1
0.1 TABLET ORAL NIGHTLY
Qty: 30 TABLET | Refills: 3 | Status: SHIPPED | OUTPATIENT
Start: 2019-12-05 | End: 2020-03-05 | Stop reason: SDUPTHER

## 2019-12-05 RX ORDER — BUSPIRONE HYDROCHLORIDE 15 MG/1
30 TABLET ORAL 2 TIMES DAILY
Qty: 120 TABLET | Refills: 3 | Status: SHIPPED | OUTPATIENT
Start: 2019-12-05 | End: 2020-03-05 | Stop reason: SDUPTHER

## 2019-12-20 RX ORDER — DOXEPIN HYDROCHLORIDE 10 MG/1
10 CAPSULE ORAL NIGHTLY
Qty: 30 CAPSULE | Refills: 3 | Status: SHIPPED | OUTPATIENT
Start: 2019-12-20 | End: 2020-01-14

## 2020-01-02 ENCOUNTER — OFFICE VISIT (OUTPATIENT)
Dept: PSYCHIATRY | Age: 40
End: 2020-01-02
Payer: COMMERCIAL

## 2020-01-02 PROCEDURE — 90837 PSYTX W PT 60 MINUTES: CPT | Performed by: SOCIAL WORKER

## 2020-01-02 NOTE — PROGRESS NOTES
Therapy Progress Note  Art Garcia MSW, LCSW  1/2/2020  2:39 PM  3:39 PM      Time spent with Patient: 60 minutes  This is patient's fourth  Therapy appointment. Reason for Consult:  depression and anxiety  Referring Provider: No referring provider defined for this encounter. Michaela Goel ,a 44 y.o. female, for initial evaluation visit. Pt provided informed consent for the behavioral health program. Discussed with patient model of service to include the limits of confidentiality (i.e. abuse reporting, suicide intervention, etc.) and short-term intervention focused approach. Discussed no show and late cancellation policy. Pt indicated understanding. S:  Pt reported multiple stressors including: trying to find a relationship, ex-husbands new fiance, pending court issues, relationship issues with her children, and a friend who is an severe alcoholic. Pt explained details of multiple stressors. Pt has reached out to ex-'s fiance's ex- and learned of ex-'s fiance's; also hired the same  ex-'s fiance's ex- used during their custody posadas. Pt denies Suicidal Ideations, Homicidal Ideation, Auditory Hallucinations, Visual Hallucinations, Tactical Hallucinations.         MSE:    Appearance    alert, cooperative, moderate distress  Appetite normal  Sleep disturbance No  Fatigue No  Loss of pleasure No  Impulsive behavior Yes  Speech    normal rate and normal volume  Mood    Anxious  Depressed  Affect    depressed affect  Thought Content    cognitive distortions and all or nothing thinking  Thought Process    circumstantial  Associations    logical connections  Insight    Fair  Judgment    Intact  Orientation    oriented to person, place, time, and general circumstances  Memory    recent and remote memory intact  Attention/Concentration    intact  Morbid ideation No  Suicide Assessment    no suicidal ideation      History:  Social History     Socioeconomic History    Glasses of wine, 1 Shots of liquor per week     Frequency: Monthly or less     Drinks per session: 1 or 2     Comment: \"I used to drink a lot. I drink 2 to 3 drinks a month now\".  Drug use: Yes     Types: Marijuana     Comment: uses marijuana every day, stopped Xanax 6/17/19.  Sexual activity: Not Currently   Lifestyle    Physical activity:     Days per week: Not on file     Minutes per session: Not on file    Stress: Not on file   Relationships    Social connections:     Talks on phone: Not on file     Gets together: Not on file     Attends [employer] service: Not on file     Active member of club or organization: Not on file     Attends meetings of clubs or organizations: Not on file     Relationship status: Not on file    Intimate partner violence:     Fear of current or ex partner: Not on file     Emotionally abused: Not on file     Physically abused: Not on file     Forced sexual activity: Not on file   Other Topics Concern    Not on file   Social History Narrative    PREVIOUS MEDICATION TRIALS    Ambien    Effexor XR (current, 150mg)    Prozac (several years, 40mg, doesn't remember why she stopped)    Elavil    Zoloft (several years, doesn't remember the dose, stopped taking because she was pregnant)    Xanax         No negative effect with taking SSRI's       TOBACCO:   reports that she has been smoking. She started smoking about 24 years ago. She has been smoking about 1.00 pack per day. She has never used smokeless tobacco.  ETOH:   reports current alcohol use of about 2.0 standard drinks of alcohol per week.     Family History:   Family History   Problem Relation Age of Onset    High Blood Pressure Mother     Depression Mother     Anxiety Disorder Mother     Heart Attack Father     Heart Disease Father     High Cholesterol Father        Diagnosis:    Major depressive disorder; recurrent and moderate      Diagnosis Date    CAD (coronary artery disease)     MI about 1 1/2 yrs ago    CHF

## 2020-01-14 ENCOUNTER — TELEPHONE (OUTPATIENT)
Dept: PSYCHIATRY | Age: 40
End: 2020-01-14

## 2020-01-14 RX ORDER — AMITRIPTYLINE HYDROCHLORIDE 25 MG/1
25 TABLET, FILM COATED ORAL NIGHTLY
Qty: 30 TABLET | Refills: 3 | Status: SHIPPED | OUTPATIENT
Start: 2020-01-14 | End: 2020-05-04

## 2020-01-14 NOTE — PROGRESS NOTES
1/14/2020 1720    Patient reported to LUI Hogan RN, that her pharmacy can no longer get a supply of Doxepin. Will substitute and  Amitriptyline, 25mg, QHS.     GRACE SimmonsNP

## 2020-01-23 ENCOUNTER — OFFICE VISIT (OUTPATIENT)
Dept: PSYCHIATRY | Age: 40
End: 2020-01-23
Payer: COMMERCIAL

## 2020-01-23 PROCEDURE — 90837 PSYTX W PT 60 MINUTES: CPT | Performed by: SOCIAL WORKER

## 2020-01-23 NOTE — PROGRESS NOTES
Therapy Progress Note  Dakota Wynn MSW, LCSW  1/23/2020  2:53 PM  3:53 PM      Time spent with Patient: 60 minutes  This is patient's fifth  Therapy appointment. Reason for Consult:  depression and anxiety  Referring Provider: No referring provider defined for this encounter. Myra Urbina ,a 44 y.o. female, for initial evaluation visit. Pt provided informed consent for the behavioral health program. Discussed with patient model of service to include the limits of confidentiality (i.e. abuse reporting, suicide intervention, etc.) and short-term intervention focused approach. Discussed no show and late cancellation policy. Pt indicated understanding. S:  Pt reported fighting with her daughter has calmed down and [employer] has closed their case. Pt reported she has not found a love interest. Pt discussed issues with her friend who is an alcoholic who is in denial. Pt shared her home clutter is overwhelming. Pt developed a plan to clean her home prior to next appointment and an award of a 90 minute massage. Pt denies Suicidal Ideations, Homicidal Ideation, Auditory Hallucinations, Visual Hallucinations, Tactical Hallucinations.         MSE:    Appearance    alert, cooperative, no distress  Appetite normal  Sleep disturbance No  Fatigue No  Loss of pleasure No  Impulsive behavior Yes  Speech    normal rate and normal volume  Mood    Anxious  Depressed  Affect    normal affect  Thought Content    cognitive distortions  Thought Process    circumstantial  Associations    logical connections  Insight    Fair  Judgment    Intact  Orientation    oriented to person, place, time, and general circumstances  Memory    recent and remote memory intact  Attention/Concentration    intact  Morbid ideation No  Suicide Assessment    no suicidal ideation      History:  Social History     Socioeconomic History    Marital status: Single     Spouse name: Not on file    Number of children: 2    Years of education: assoc degree    Highest education level: Not on file   Occupational History    Not on file   Social Needs    Financial resource strain: Not on file    Food insecurity:     Worry: Not on file     Inability: Not on file    Transportation needs:     Medical: Not on file     Non-medical: Not on file   Tobacco Use    Smoking status: Current Every Day Smoker     Packs/day: 1.00     Start date: [address] tobacco: Never Used    Tobacco comment: 6/5/19 pt given in on smoking and smoking cessation. Substance and Sexual Activity    Alcohol use: Yes     Alcohol/week: 2.0 standard drinks     Types: 1 Glasses of wine, 1 Shots of liquor per week     Frequency: Monthly or less     Drinks per session: 1 or 2     Comment: \"I used to drink a lot. I drink 2 to 3 drinks a month now\".  Drug use: Yes     Types: Marijuana     Comment: uses marijuana every day, stopped Xanax 6/17/19.      Sexual activity: Not Currently   Lifestyle    Physical activity:     Days per week: Not on file     Minutes per session: Not on file    Stress: Not on file   Relationships    Social connections:     Talks on phone: Not on file     Gets together: Not on file     Attends Evangelical service: Not on file     Active member of club or organization: Not on file     Attends meetings of clubs or organizations: Not on file     Relationship status: Not on file    Intimate partner violence:     Fear of current or ex partner: Not on file     Emotionally abused: Not on file     Physically abused: Not on file     Forced sexual activity: Not on file   Other Topics Concern    Not on file   Social History Narrative    PREVIOUS MEDICATION TRIALS    Ambien    Effexor XR (current, 150mg)    Prozac (several years, 40mg, doesn't remember why she stopped)    Elavil    Zoloft (several years, doesn't remember the dose, stopped taking because she was pregnant)    Xanax         No negative effect with taking SSRI's       Medications:   Current Outpatient Medications Medication Sig Dispense Refill    amitriptyline (ELAVIL) 25 MG tablet Take 1 tablet by mouth nightly 30 tablet 3    busPIRone (BUSPAR) 15 MG tablet Take 30 mg by mouth 2 times daily 120 tablet 3    cloNIDine (CATAPRES) 0.1 MG tablet Take 1 tablet by mouth nightly 30 tablet 3    venlafaxine (EFFEXOR XR) 150 MG extended release capsule Take 1 capsule by mouth daily 30 capsule 3    venlafaxine (EFFEXOR XR) 75 MG extended release capsule Take 1 capsule by mouth daily 30 capsule 3    adalimumab (HUMIRA) 40 MG/0.8ML injection Inject 40 mg into the skin every 14 days Indications: Psoriasis      metoprolol tartrate (LOPRESSOR) 25 MG tablet 25 mg nightly   11    atorvastatin (LIPITOR) 80 MG tablet Take by mouth nightly   11    BRILINTA 90 MG TABS tablet Take 90 mg by mouth nightly   11    ondansetron (ZOFRAN-ODT) 4 MG disintegrating tablet Take 4 mg by mouth every 6 hours as needed for Nausea or Vomiting      aspirin 81 MG tablet Take 81 mg by mouth daily       No current facility-administered medications for this visit. Social History:   Social History     Socioeconomic History    Marital status: Single     Spouse name: Not on file    Number of children: 2    Years of education: assoc degree    Highest education level: Not on file   Occupational History    Not on file   Social Needs    Financial resource strain: Not on file    Food insecurity:     Worry: Not on file     Inability: Not on file    Transportation needs:     Medical: Not on file     Non-medical: Not on file   Tobacco Use    Smoking status: Current Every Day Smoker     Packs/day: 1.00     Start date: 1996    Smokeless tobacco: Never Used    Tobacco comment: 6/5/19 pt given in on smoking and smoking cessation. Substance and Sexual Activity    Alcohol use:  Yes     Alcohol/week: 2.0 standard drinks     Types: 1 Glasses of wine, 1 Shots of liquor per week     Frequency: Monthly or less     Drinks per session: 1 or 2     Comment: \"I uses CPAP    Psoriasis     Psychiatric problem     hx of depression and anxiety since a teenager. Problems with primary support group and Problems related to the social environment    Plan:  1. Continue medication management  2. CBT to target cognitive distortions  3. Discuss therapeutic goals  4. Assertive Communications  5.  Healthy choices with relationships    Pt interventions:  Trained in strategies for increasing balanced thinking, Provided education, Established rapport and Supportive techniques      Tamar Smith MSWKENN

## 2020-02-12 ENCOUNTER — OFFICE VISIT (OUTPATIENT)
Dept: PSYCHIATRY | Age: 40
End: 2020-02-12
Payer: COMMERCIAL

## 2020-02-12 PROCEDURE — 90837 PSYTX W PT 60 MINUTES: CPT | Performed by: SOCIAL WORKER

## 2020-02-12 NOTE — PROGRESS NOTES
Therapy Progress Note  Elyssa Marking LORRAINE, KENN  2/12/2020  3:00 PM  4:36 PM      Time spent with Patient: 96 minutes  This is patient's sixth  Therapy appointment. Reason for Consult:  depression and anxiety  Referring Provider: No referring provider defined for this encounter. Olivia Russell ,a 44 y.o. female, for initial evaluation visit. Pt provided informed consent for the behavioral health program. Discussed with patient model of service to include the limits of confidentiality (i.e. abuse reporting, suicide intervention, etc.) and short-term intervention focused approach. Discussed no show and late cancellation policy. Pt indicated understanding. S:  Pt reported she was unable to complete the tasks to clean and organize her home. Pt reported she was having overwhelming thoughts that LCSW would be angry if these tasks were not completed. Discussed negative thought stopping and positive affirmations. Pt showed pictures of her rooms in her home with discussion of how she will clean the home. Pt discussed verbally fighting with her ex  (the kids father) about custody and using her boundaries to manipulate him. Pt reported she wants him to hurt as bad as he has hurt her. Pt was using all or nothing statements, justification, rationalization, magnification, magical thinking, over generalization, personalization, emotional reasoning, and should statements. Pt was tearful, raised her voice, and was defensive. LCSW was supportive and confronted pt's cognitive distortion and the negative effects of her behaviors on her children and herself. Pt denies Suicidal Ideations, Homicidal Ideation, Auditory Hallucinations, Visual Hallucinations, Tactical Hallucinations.         MSE:    Appearance    alert, cooperative, mild distress  Appetite normal  Sleep disturbance No  Fatigue No  Loss of pleasure No  Impulsive behavior No  Speech    normal rate and normal volume  Mood    Anxious  Depressed  Affect current or ex partner: Not on file     Emotionally abused: Not on file     Physically abused: Not on file     Forced sexual activity: Not on file   Other Topics Concern    Not on file   Social History Narrative    PREVIOUS MEDICATION TRIALS    Ambien    Effexor XR (current, 150mg)    Prozac (several years, 40mg, doesn't remember why she stopped)    Elavil    Zoloft (several years, doesn't remember the dose, stopped taking because she was pregnant)    Xanax         No negative effect with taking SSRI's       Medications:   Current Outpatient Medications   Medication Sig Dispense Refill    amitriptyline (ELAVIL) 25 MG tablet Take 1 tablet by mouth nightly 30 tablet 3    busPIRone (BUSPAR) 15 MG tablet Take 30 mg by mouth 2 times daily 120 tablet 3    cloNIDine (CATAPRES) 0.1 MG tablet Take 1 tablet by mouth nightly 30 tablet 3    venlafaxine (EFFEXOR XR) 150 MG extended release capsule Take 1 capsule by mouth daily 30 capsule 3    venlafaxine (EFFEXOR XR) 75 MG extended release capsule Take 1 capsule by mouth daily 30 capsule 3    adalimumab (HUMIRA) 40 MG/0.8ML injection Inject 40 mg into the skin every 14 days Indications: Psoriasis      metoprolol tartrate (LOPRESSOR) 25 MG tablet 25 mg nightly   11    atorvastatin (LIPITOR) 80 MG tablet Take by mouth nightly   11    BRILINTA 90 MG TABS tablet Take 90 mg by mouth nightly   11    ondansetron (ZOFRAN-ODT) 4 MG disintegrating tablet Take 4 mg by mouth every 6 hours as needed for Nausea or Vomiting      aspirin 81 MG tablet Take 81 mg by mouth daily       No current facility-administered medications for this visit.         Social History:   Social History     Socioeconomic History    Marital status: Single     Spouse name: Not on file    Number of children: 2    Years of education: assoc degree    Highest education level: Not on file   Occupational History    Not on file   Social Needs    Financial resource strain: Not on file    Food insecurity: about 2.0 standard drinks of alcohol per week. Family History:   Family History   Problem Relation Age of Onset    High Blood Pressure Mother     Depression Mother     Anxiety Disorder Mother     Heart Attack Father     Heart Disease Father     High Cholesterol Father        Diagnosis:    Major depressive disorder; recurrent and moderate      Diagnosis Date    CAD (coronary artery disease)     MI about 1 1/2 yrs ago    CHF (congestive heart failure) (HCC)     Hyperlipidemia     Hypertension     kidney stones     VINOD (obstructive sleep apnea)     uses CPAP    Psoriasis     Psychiatric problem     hx of depression and anxiety since a teenager. Problems with primary support group and Problems related to the social environment    Plan:  1. Continue medication management  2. CBT to target cognitive distortions  3. Discuss therapeutic goals  4. Setting boundaries   5.  Assertive Communications    Pt interventions:  Practiced assertive communication, Trained in strategies for increasing balanced thinking, Discussed and set plan for behavioral activation, Trained in improving communication skills, Provided education, Established rapport and Supportive techniques      Loretta Orozco MSW, LCSW

## 2020-03-04 ENCOUNTER — OFFICE VISIT (OUTPATIENT)
Dept: PSYCHIATRY | Age: 40
End: 2020-03-04
Payer: COMMERCIAL

## 2020-03-04 PROCEDURE — 90837 PSYTX W PT 60 MINUTES: CPT | Performed by: SOCIAL WORKER

## 2020-03-04 NOTE — PROGRESS NOTES
oriented to person, place, time, and general circumstances  Memory    recent and remote memory intact  Attention/Concentration    intact  Morbid ideation Yes  Suicide Assessment    no suicidal ideation      History:  Social History     Socioeconomic History    Marital status: Single     Spouse name: Not on file    Number of children: 2    Years of education: assoc degree    Highest education level: Not on file   Occupational History    Not on file   Social Needs    Financial resource strain: Not on file    Food insecurity:     Worry: Not on file     Inability: Not on file    Transportation needs:     Medical: Not on file     Non-medical: Not on file   Tobacco Use    Smoking status: Current Every Day Smoker     Packs/day: 1.00     Start date: 1996    Smokeless tobacco: Never Used    Tobacco comment: 6/5/19 pt given in on smoking and smoking cessation. Substance and Sexual Activity    Alcohol use: Yes     Alcohol/week: 2.0 standard drinks     Types: 1 Glasses of wine, 1 Shots of liquor per week     Frequency: Monthly or less     Drinks per session: 1 or 2     Comment: \"I used to drink a lot. I drink 2 to 3 drinks a month now\".  Drug use: Yes     Types: Marijuana     Comment: uses marijuana every day, stopped Xanax 6/17/19.      Sexual activity: Not Currently   Lifestyle    Physical activity:     Days per week: Not on file     Minutes per session: Not on file    Stress: Not on file   Relationships    Social connections:     Talks on phone: Not on file     Gets together: Not on file     Attends Christian service: Not on file     Active member of club or organization: Not on file     Attends meetings of clubs or organizations: Not on file     Relationship status: Not on file    Intimate partner violence:     Fear of current or ex partner: Not on file     Emotionally abused: Not on file     Physically abused: Not on file     Forced sexual activity: Not on file   Other Topics Concern    Not on file Social History Narrative    PREVIOUS MEDICATION TRIALS    Ambien    Effexor XR (current, 150mg)    Prozac (several years, 40mg, doesn't remember why she stopped)    Elavil    Zoloft (several years, doesn't remember the dose, stopped taking because she was pregnant)    Xanax         No negative effect with taking SSRI's       Medications:   Current Outpatient Medications   Medication Sig Dispense Refill    amitriptyline (ELAVIL) 25 MG tablet Take 1 tablet by mouth nightly 30 tablet 3    busPIRone (BUSPAR) 15 MG tablet Take 30 mg by mouth 2 times daily 120 tablet 3    cloNIDine (CATAPRES) 0.1 MG tablet Take 1 tablet by mouth nightly 30 tablet 3    venlafaxine (EFFEXOR XR) 150 MG extended release capsule Take 1 capsule by mouth daily 30 capsule 3    venlafaxine (EFFEXOR XR) 75 MG extended release capsule Take 1 capsule by mouth daily 30 capsule 3    adalimumab (HUMIRA) 40 MG/0.8ML injection Inject 40 mg into the skin every 14 days Indications: Psoriasis      metoprolol tartrate (LOPRESSOR) 25 MG tablet 25 mg nightly   11    atorvastatin (LIPITOR) 80 MG tablet Take by mouth nightly   11    BRILINTA 90 MG TABS tablet Take 90 mg by mouth nightly   11    ondansetron (ZOFRAN-ODT) 4 MG disintegrating tablet Take 4 mg by mouth every 6 hours as needed for Nausea or Vomiting      aspirin 81 MG tablet Take 81 mg by mouth daily       No current facility-administered medications for this visit.         Social History:   Social History     Socioeconomic History    Marital status: Single     Spouse name: Not on file    Number of children: 2    Years of education: assoc degree    Highest education level: Not on file   Occupational History    Not on file   Social Needs    Financial resource strain: Not on file    Food insecurity:     Worry: Not on file     Inability: Not on file    Transportation needs:     Medical: Not on file     Non-medical: Not on file   Tobacco Use    Smoking status: Current Every Day Smoker Packs/day: 1.00     Start date: 56    Smokeless tobacco: Never Used    Tobacco comment: 6/5/19 pt given in on smoking and smoking cessation. Substance and Sexual Activity    Alcohol use: Yes     Alcohol/week: 2.0 standard drinks     Types: 1 Glasses of wine, 1 Shots of liquor per week     Frequency: Monthly or less     Drinks per session: 1 or 2     Comment: \"I used to drink a lot. I drink 2 to 3 drinks a month now\".  Drug use: Yes     Types: Marijuana     Comment: uses marijuana every day, stopped Xanax 6/17/19.  Sexual activity: Not Currently   Lifestyle    Physical activity:     Days per week: Not on file     Minutes per session: Not on file    Stress: Not on file   Relationships    Social connections:     Talks on phone: Not on file     Gets together: Not on file     Attends Advent service: Not on file     Active member of club or organization: Not on file     Attends meetings of clubs or organizations: Not on file     Relationship status: Not on file    Intimate partner violence:     Fear of current or ex partner: Not on file     Emotionally abused: Not on file     Physically abused: Not on file     Forced sexual activity: Not on file   Other Topics Concern    Not on file   Social History Narrative    PREVIOUS MEDICATION TRIALS    Ambien    Effexor XR (current, 150mg)    Prozac (several years, 40mg, doesn't remember why she stopped)    Elavil    Zoloft (several years, doesn't remember the dose, stopped taking because she was pregnant)    Xanax         No negative effect with taking SSRI's       TOBACCO:   reports that she has been smoking. She started smoking about 24 years ago. She has been smoking about 1.00 pack per day. She has never used smokeless tobacco.  ETOH:   reports current alcohol use of about 2.0 standard drinks of alcohol per week.     Family History:   Family History   Problem Relation Age of Onset    High Blood Pressure Mother     Depression Mother     Anxiety

## 2020-03-05 ENCOUNTER — OFFICE VISIT (OUTPATIENT)
Dept: PSYCHIATRY | Age: 40
End: 2020-03-05
Payer: COMMERCIAL

## 2020-03-05 VITALS
HEART RATE: 82 BPM | SYSTOLIC BLOOD PRESSURE: 138 MMHG | HEIGHT: 65 IN | DIASTOLIC BLOOD PRESSURE: 98 MMHG | WEIGHT: 226 LBS | OXYGEN SATURATION: 98 % | BODY MASS INDEX: 37.65 KG/M2

## 2020-03-05 PROCEDURE — 99214 OFFICE O/P EST MOD 30 MIN: CPT | Performed by: NURSE PRACTITIONER

## 2020-03-05 RX ORDER — CLONIDINE HYDROCHLORIDE 0.1 MG/1
0.1 TABLET ORAL NIGHTLY
Qty: 30 TABLET | Refills: 3 | Status: SHIPPED | OUTPATIENT
Start: 2020-03-05 | End: 2020-07-31

## 2020-03-05 RX ORDER — BUSPIRONE HYDROCHLORIDE 15 MG/1
30 TABLET ORAL 2 TIMES DAILY
Qty: 120 TABLET | Refills: 3 | Status: SHIPPED | OUTPATIENT
Start: 2020-03-05 | End: 2020-07-31 | Stop reason: SDUPTHER

## 2020-03-05 RX ORDER — VENLAFAXINE HYDROCHLORIDE 150 MG/1
150 CAPSULE, EXTENDED RELEASE ORAL DAILY
Qty: 30 CAPSULE | Refills: 3 | Status: SHIPPED | OUTPATIENT
Start: 2020-03-05 | End: 2020-06-04 | Stop reason: SDUPTHER
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
## 2020-03-17 ENCOUNTER — OFFICE VISIT (OUTPATIENT)
Dept: PSYCHIATRY | Age: 40
End: 2020-03-17
Payer: COMMERCIAL

## 2020-03-17 PROCEDURE — 90837 PSYTX W PT 60 MINUTES: CPT | Performed by: SOCIAL WORKER
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
## 2020-04-13 ENCOUNTER — VIRTUAL VISIT (OUTPATIENT)
Dept: PSYCHIATRY | Age: 40
End: 2020-04-13
Payer: COMMERCIAL

## 2020-04-13 PROCEDURE — 98968 PH1 ASSMT&MGMT NQHP 21-30: CPT | Performed by: SOCIAL WORKER

## 2020-04-13 NOTE — PROGRESS NOTES
Comment: \"I used to drink a lot. I drink 2 to 3 drinks a month now\".  Drug use: Yes     Types: Marijuana     Comment: uses marijuana every day, stopped Xanax 6/17/19.      Sexual activity: Not Currently   Lifestyle    Physical activity     Days per week: Not on file     Minutes per session: Not on file    Stress: Not on file   Relationships    Social connections     Talks on phone: Not on file     Gets together: Not on file     Attends Anabaptist service: Not on file     Active member of club or organization: Not on file     Attends meetings of clubs or organizations: Not on file     Relationship status: Not on file    Intimate partner violence     Fear of current or ex partner: Not on file     Emotionally abused: Not on file     Physically abused: Not on file     Forced sexual activity: Not on file   Other Topics Concern    Not on file   Social History Narrative    PREVIOUS MEDICATION TRIALS    Ambien    Effexor XR (current, 150mg)    Prozac (several years, 40mg, doesn't remember why she stopped)    Elavil    Zoloft (several years, doesn't remember the dose, stopped taking because she was pregnant)    Xanax         No negative effect with taking SSRI's       Medications:   Current Outpatient Medications   Medication Sig Dispense Refill    busPIRone (BUSPAR) 15 MG tablet Take 30 mg by mouth 2 times daily 120 tablet 3    cloNIDine (CATAPRES) 0.1 MG tablet Take 1 tablet by mouth nightly 30 tablet 3    venlafaxine (EFFEXOR XR) 150 MG extended release capsule Take 1 capsule by mouth daily 30 capsule 3    amitriptyline (ELAVIL) 25 MG tablet Take 1 tablet by mouth nightly 30 tablet 3    venlafaxine (EFFEXOR XR) 75 MG extended release capsule Take 1 capsule by mouth daily 30 capsule 3    adalimumab (HUMIRA) 40 MG/0.8ML injection Inject 40 mg into the skin every 14 days Indications: Psoriasis      metoprolol tartrate (LOPRESSOR) 25 MG tablet 25 mg nightly   11    atorvastatin (LIPITOR) 80 MG tablet Take by mouth nightly   11    BRILINTA 90 MG TABS tablet Take 90 mg by mouth nightly   11    ondansetron (ZOFRAN-ODT) 4 MG disintegrating tablet Take 4 mg by mouth every 6 hours as needed for Nausea or Vomiting      aspirin 81 MG tablet Take 81 mg by mouth daily       No current facility-administered medications for this visit. Social History:   Social History     Socioeconomic History    Marital status: Single     Spouse name: Not on file    Number of children: 2    Years of education: assoc degree    Highest education level: Not on file   Occupational History    Not on file   Social Needs    Financial resource strain: Not on file    Food insecurity     Worry: Not on file     Inability: Not on file   [employer] needs     Medical: Not on file     Non-medical: Not on file   Tobacco Use    Smoking status: Current Every Day Smoker     Packs/day: 1.00     Start date: 1996    Smokeless tobacco: Never Used    Tobacco comment: 6/5/19 pt given in on smoking and smoking cessation. Substance and Sexual Activity    Alcohol use: Yes     Alcohol/week: 2.0 standard drinks     Types: 1 Glasses of wine, 1 Shots of liquor per week     Frequency: Monthly or less     Drinks per session: 1 or 2     Comment: \"I used to drink a lot. I drink 2 to 3 drinks a month now\".  Drug use: Yes     Types: Marijuana     Comment: uses marijuana every day, stopped Xanax 6/17/19.      Sexual activity: Not Currently   Lifestyle    Physical activity     Days per week: Not on file     Minutes per session: Not on file    Stress: Not on file   Relationships    Social connections     Talks on phone: Not on file     Gets together: Not on file     Attends Gnosticist service: Not on file     Active member of club or organization: Not on file     Attends meetings of clubs or organizations: Not on file     Relationship status: Not on file    Intimate partner violence     Fear of current or ex partner: Not on file     Emotionally abused:

## 2020-05-13 ENCOUNTER — OFFICE VISIT (OUTPATIENT)
Dept: CARDIOLOGY | Facility: CLINIC | Age: 40
End: 2020-05-13

## 2020-05-13 VITALS — WEIGHT: 210 LBS | HEIGHT: 65 IN | BODY MASS INDEX: 34.99 KG/M2

## 2020-05-13 DIAGNOSIS — Z72.0 TOBACCO ABUSE: ICD-10-CM

## 2020-05-13 DIAGNOSIS — E78.2 MIXED HYPERLIPIDEMIA: ICD-10-CM

## 2020-05-13 DIAGNOSIS — I25.10 CORONARY ARTERY DISEASE INVOLVING NATIVE CORONARY ARTERY OF NATIVE HEART WITHOUT ANGINA PECTORIS: Primary | ICD-10-CM

## 2020-05-13 PROCEDURE — 99442 PR PHYS/QHP TELEPHONE EVALUATION 11-20 MIN: CPT | Performed by: INTERNAL MEDICINE

## 2020-05-13 RX ORDER — BUSPIRONE HYDROCHLORIDE 30 MG/1
30 TABLET ORAL 2 TIMES DAILY
COMMUNITY

## 2020-05-13 NOTE — PROGRESS NOTES
Subjective:     Encounter Date:05/13/2020      Patient ID: Leidy Lerma is a 39 y.o. female with coronary artery disease, status post PCI of the right coronary artery at the time of an inferior STEMI (10/2017), hyperlipidemia, tobacco abuse who presents today for follow-up.    You have chosen to receive care through a telephone visit. Do you consent to use a telephone visit for your medical care today? Yes    Chief Complaint: Follow-up of coronary artery disease    Coronary Artery Disease   Presents for follow-up visit. Symptoms include leg swelling (Ankle edema at the end of the day, resolved by morning hours). Pertinent negatives include no chest pain, dizziness, palpitations, shortness of breath or weight gain. The symptoms have been stable. Compliance with diet is variable. Compliance with exercise is variable. Compliance with medications is good.     This patient presents today for routine follow-up of coronary artery disease.  She says that she has been doing well.  She denies any recurrent chest pain.  She denies any significant shortness of breath or dyspnea on exertion.  She remains physically active with no limitations to activities.  No palpitations, lightheadedness, dizziness, syncope, orthopnea, PND.  The patient has noticed some ankle edema at the end of the day.  However, she says that she has been on her feet over the past few months most of the day every day and only notices this in the evening hours and this has resolved by the morning hours.  Once again, no orthopnea, PND.  No significant weight change.  The patient's blood pressure has been well controlled.  She is due for a lipid panel.  She remains on dual antiplatelet therapies.  She was instructed before that she could feel safe to discontinue Brilinta, however she has elected to remain on this.  She has not had any bleeding difficulties.  She says that she has been weaning herself down off the Brilinta and is only taking once per day.   She says that her insurance will no longer pay for Brilinta, therefore she is planning to stop soon.  Unfortunately, she continues to smoke.      Current Outpatient Medications:   •  aspirin 81 MG EC tablet, Take 1 tablet by mouth Daily., Disp: 30 tablet, Rfl: 11  •  atorvastatin (LIPITOR) 80 MG tablet, TAKE 1 TABLET BY MOUTH NIGHTLY, Disp: 30 tablet, Rfl: 11  •  BRILINTA 90 MG tablet tablet, TAKE 1 TABLET BY MOUTH EVERY 12 HOURS (Patient taking differently: Take 90 mg by mouth 1 (One) Time.), Disp: 60 tablet, Rfl: 11  •  busPIRone (BUSPAR) 30 MG tablet, Take 30 mg by mouth 2 (Two) Times a Day., Disp: , Rfl:   •  HUMIRA PEN 40 MG/0.4ML Pen-injector Kit, Inject 40 mg under the skin into the appropriate area as directed Every 14 (Fourteen) Days., Disp: , Rfl:   •  metoprolol tartrate (LOPRESSOR) 25 MG tablet, TAKE 1 TABLET BY MOUTH EVERY 12 HOURS, Disp: 60 tablet, Rfl: 11  •  nitroglycerin (NITROSTAT) 0.4 MG SL tablet, Place 1 tablet under the tongue Every 5 (Five) Minutes As Needed for Chest Pain. Take no more than 3 doses in 15 minutes., Disp: 30 tablet, Rfl: 12  •  VENLAFAXINE HCL PO, Take 150 mg by mouth Daily., Disp: , Rfl:   •  varenicline (CHANTIX CONTINUING MONTH OTIS) 1 MG tablet, Take 1 tablet by mouth 2 (Two) Times a Day., Disp: 60 tablet, Rfl: 4  •  varenicline (CHANTIX STARTING MONTH OTIS) 0.5 MG X 11 & 1 MG X 42 tablet, Take 0.5 mg one daily on days 1-3 and 0.5 mg twice daily on days 4-7. Then 1 mg twice daily for a total of 12 weeks., Disp: 42 tablet, Rfl: 0    Allergies   Allergen Reactions   • Penicillins Hives     Social History     Tobacco Use   • Smoking status: Current Every Day Smoker     Packs/day: 1.00     Types: Cigarettes   • Smokeless tobacco: Never Used   Substance Use Topics   • Alcohol use: Yes     Comment: 1-2 drinks a week of liquor     Review of Systems   Constitution: Negative for fever, weight gain and weight loss.   Cardiovascular: Positive for leg swelling (Ankle edema at the end of  the day, resolved by morning hours). Negative for chest pain, dyspnea on exertion, orthopnea, palpitations, paroxysmal nocturnal dyspnea and syncope.   Respiratory: Negative for cough, shortness of breath and wheezing.    Endocrine: Negative for cold intolerance and heat intolerance.   Hematologic/Lymphatic: Negative for bleeding problem. Does not bruise/bleed easily.   Gastrointestinal: Negative for abdominal pain, hematemesis, hematochezia, melena, nausea and vomiting.   Neurological: Negative for dizziness, headaches, loss of balance and weakness.     Procedures -none today although I did review the most recent ECG from 5/13/2019 showing sinus rhythm with a ventricular rate of 73 bpm and nonspecific ST changes       Objective:       Physical exam: Unavailable as this is a telephone visit.    Data/Lab Review:     I did review the report of the patient's echocardiogram from 10/5/2017 showing normal left ventricular systolic function with basal inferior hypokinesis, borderline LVH, trace tricuspid valve regurgitation.    I reviewed the report of the patient's cardiac catheterization from 10/5/2017 at the time of an inferior STEMI.  She had mild disease in both the LAD and circumflex and an acutely occluded right coronary artery where a drug-eluting stent was placed in the right coronary artery.        Assessment:          Diagnosis Plan   1. Coronary artery disease involving native coronary artery of native heart without angina pectoris     2. Mixed hyperlipidemia  Lipid Panel   3. Tobacco abuse          Plan:       1.  Coronary artery disease: Clinically stable at this time.  We did discuss the rationale for dual antiplatelet use, and the fact that dual antiplatelet therapy is no longer indicated for her.  Also discussed that by taking Brilinta only once per day, she is essentially not getting any drug for 12 hours of the day.  I think that it is reasonable for her to just discontinue Brilinta.  She will continue  to take 1 a day, however until her current medications run out.  She was instructed to remain on aspirin without any interruption.    2.  Mixed hyperlipidemia: The patient is due for a lipid panel at this time.  She remains on high intensity statin therapy and is tolerating this well.    Leidy Lerma  reports that she has been smoking cigarettes. She has been smoking about 1.00 pack per day. She has never used smokeless tobacco.. I have educated her on the risk of diseases from using tobacco products such as heart diease. I advised her to quit and she is not willing to quit. I spent 3  minutes counseling the patient.     This visit has been rescheduled as a phone visit to comply with patient safety concerns in accordance with CDC recommendations. Total time of discussion was 11 minutes.    Follow-up: 12 months unless otherwise needed sooner.

## 2020-06-04 ENCOUNTER — VIRTUAL VISIT (OUTPATIENT)
Dept: PSYCHIATRY | Age: 40
End: 2020-06-04
Payer: COMMERCIAL

## 2020-06-04 PROCEDURE — 99443 PR PHYS/QHP TELEPHONE EVALUATION 21-30 MIN: CPT | Performed by: NURSE PRACTITIONER

## 2020-06-04 RX ORDER — VENLAFAXINE HYDROCHLORIDE 150 MG/1
150 CAPSULE, EXTENDED RELEASE ORAL DAILY
Qty: 90 CAPSULE | Refills: 0 | Status: SHIPPED | OUTPATIENT
Start: 2020-06-04 | End: 2020-09-04 | Stop reason: SDUPTHER

## 2020-06-04 NOTE — PROGRESS NOTES
encounter. 9. Additional comments: She reports that her medications continue to work. She is having some sleep issues with not being able to fall asleep. She is going to try taking Melatonin for sleep initiation. Also reviewed with her Circadian Rhythm and light stimulation. She is going back to work next week and she thinks this may help with sleep. Will include sleep hygiene tips with her AVS. If these things do not work, may consider increasing Amitriptyline. Will make no other changes and will follow up in about 3 months. She says that she has a custody hearing coming up which, if it doesn't go well, may be upsetting. She was encouraged to call sooner if her mood declines or anxiety increases. She verbalized understanding. 10.Over 50% of the total visit time of 25  minutes was spent on counseling and/or coordination of care of:                        1. Moderate episode of recurrent major depressive disorder ([facility] [address]    2. Generalized anxiety disorder with panic attacks    3. Insomnia due to other mental disorder    4.  Poor concentration                      Psychotherapy Topics: mood/medication effectiveness       Dejah Juares PMHNP-BC

## 2020-07-31 RX ORDER — BUSPIRONE HYDROCHLORIDE 15 MG/1
TABLET ORAL
Qty: 120 TABLET | Refills: 2 | OUTPATIENT
Start: 2020-07-31

## 2020-07-31 RX ORDER — VENLAFAXINE HYDROCHLORIDE 75 MG/1
75 CAPSULE, EXTENDED RELEASE ORAL DAILY
Qty: 60 CAPSULE | Refills: 1 | Status: SHIPPED | OUTPATIENT
Start: 2020-07-31 | End: 2020-11-23

## 2020-07-31 RX ORDER — CLONIDINE HYDROCHLORIDE 0.1 MG/1
0.1 TABLET ORAL NIGHTLY
Qty: 90 TABLET | Refills: 0 | Status: SHIPPED | OUTPATIENT
Start: 2020-07-31 | End: 2020-09-04 | Stop reason: SDUPTHER

## 2020-07-31 RX ORDER — BUSPIRONE HYDROCHLORIDE 30 MG/1
30 TABLET ORAL 2 TIMES DAILY
Qty: 180 TABLET | Refills: 0 | Status: SHIPPED | OUTPATIENT
Start: 2020-07-31 | End: 2020-09-04 | Stop reason: SDUPTHER

## 2020-07-31 NOTE — TELEPHONE ENCOUNTER
Britton Rodriguez called to request a refill on her medication and pharmacy sent in a request also. Last office visit : 6/4/2020 with [address]  Next office visit : 9/4/2020 with Martha DILLARD    Requested Prescriptions     Pending Prescriptions Disp Refills    busPIRone (BUSPAR) 15 MG tablet [Pharmacy Med Name: busPIRone HCl 15 MG Oral Tablet] 120 tablet 0     Sig: Take 2 tablets by mouth twice daily    cloNIDine (CATAPRES) 0.1 MG tablet [Pharmacy Med Name: cloNIDine HCl 0.1 MG Oral Tablet] 30 tablet 0     Sig: Take 1 tablet by mouth nightly    venlafaxine (EFFEXOR XR) 75 MG extended release capsule [Pharmacy Med Name: Venlafaxine HCl ER 75 MG Oral Capsule Extended Release 24 Hour] 30 capsule 0     Sig: Take 1 capsule by mouth once daily            Nereyda Louise RN        6/4/2020 4:10 PM                               Progress Note     IN:  9716  OUT: 1600        Britton Rodriguez 1980                                 Chief Complaint   Patient presents with    Follow-up    Anxiety    Depression    Medication Check           Subjective:  Patient is a 44 y.o. female diagnosed with MDD and presents today for follow-up. Last seen in clinic on 3/5/20 and prior records were reviewed.     Today patient states, \"Yeah, I'm struggling a little bit with sleep. \" She says that she is working from home and goes back to work next week. She says that the month of April was stressful but that the workload has settled some. She continues to see GALI Gonzalez for therapy.     Patient reports side effects as follows: none. No evidence of EPS, no cogwheeling or abnormal motor movements.     Absent  suicidal ideation.   Reports compliance with medications as good .      Current Substance Use:  See history     BP: There were no vitals taken for this visit.        Review of Systems - 14 point review:  Negative except being treated for: psoriasis, hx of MI, HTN, anxiety, depression, insomnia, cannabis dependence        Constitutional: (fevers, chills, night sweats, wt loss/gain, change in appetite, fatigue, somnolence)     HEENT: (ear pain or discharge, hearing loss, ear ringing, sinus pressure, nosebleed, nasal discharge, sore throat, oral sores, tooth pain, bleeding gums, hoarse voice, neck pain)      Cardiovascular: (HTN, chest pain, elevated cholesterol/lipids, palpitations, leg swelling, leg pain with walking)     Respiratory: (cough, wheezing, snoring, SOB with activity (dyspnea), SOB while lying flat (orthopnea), awakening with severe SOB (paroxysmal nocturnal dyspnea))     Gastrointestinal: (NVD, constipation, abdominal pain, bright red stools, black tarry stools, stool incontinence)     Genitourinary:  (pelvic pain, burning or frequency of urination, urinary urgency, blood in urine incomplete bladder emptying, urinary incontinence, STD; MEN: testicular pain or swelling, erectile dysfunction; WOMEN: LMP, heavy menstrual bleeding (menorrhagia), irregular periods, postmenopausal bleeding, menstrual pain (dymenorrhea, vaginal discharge)     Musculoskeletal: (bone pain/fracture, joint pain or swelling, musle pain)     Integumentary: (rashes, acne, non-healing sores, itching, breast lumps, breast pain, nipple discharge, hair loss)     Neurologic: (HA, muscle weakness, paresthesias (numbness, coldness, crawling or prickling), memory loss, seizure, dizziness)     Psychiatric:  (anxiety, sadness, irritability/anger, insomnia, suicidality)     Endocrine: (heat or cold intolerance, excessive thirst (polydipsia), excessive hunger (polyphagia))     Immune/Allergic: (hives, seasonal or environmental allergies, HIV exposure)     Hematologic/Lymphatic: (lymph node enlargement, easy bleeding or bruising)     History obtained via chart review and patient     PCP is Candance Knows, DO         Current Meds:     Home Medications           Prior to Admission medications    Medication Sig Start Date End Date Taking? Authorizing Provider   venlafaxine (EFFEXOR XR) 150 MG extended release capsule Take 1 capsule by mouth daily 6/4/20   Yes GILMA Lim NP   amitriptyline (ELAVIL) 25 MG tablet Take 1 tablet by mouth nightly 5/4/20     GILMA Lim NP   busPIRone (BUSPAR) 15 MG tablet Take 30 mg by mouth 2 times daily 3/5/20     GILMA Lim NP   cloNIDine (CATAPRES) 0.1 MG tablet Take 1 tablet by mouth nightly 3/5/20     GILMA Lim NP   venlafaxine (EFFEXOR XR) 75 MG extended release capsule Take 1 capsule by mouth daily 10/30/19     GILMA Lim NP   adalimumab (HUMIRA) 40 MG/0.8ML injection Inject 40 mg into the skin every 14 days Indications: Psoriasis       Historical Provider, MD   metoprolol tartrate (LOPRESSOR) 25 MG tablet 25 mg nightly  4/1/19     Historical Provider, MD   atorvastatin (LIPITOR) 80 MG tablet Take by mouth nightly  3/28/19     Historical Provider, MD   BRILINTA 90 MG TABS tablet Take 90 mg by mouth nightly  4/1/19     Historical Provider, MD   ondansetron (ZOFRAN-ODT) 4 MG disintegrating tablet Take 4 mg by mouth every 6 hours as needed for Nausea or Vomiting       Historical Provider, MD   aspirin 81 MG tablet Take 81 mg by mouth daily       Historical Provider, MD        Social History   Social History            Socioeconomic History    Marital status: Single       Spouse name: None    Number of children: 2    Years of education: assoc degree    Highest education level: None   Occupational History    None   Social Needs    Financial resource strain: None    Food insecurity       Worry: None       Inability: None    Transportation needs       Medical: None       Non-medical: None   Tobacco Use    Smoking status: Current Every Day Smoker       Packs/day: 1.00       Start date: [address]    Smokeless tobacco: Never Used    Tobacco comment: 6/5/19 pt given in on smoking and smoking cessation. Substance and Sexual Activity    Alcohol use:  Yes       Alcohol/week: 2.0 standard drinks       Types: 1 Glasses of wine, 1 Shots of liquor per week       Frequency: Monthly or less       Drinks per session: 1 or 2       Comment: \"I used to drink a lot. I drink 2 to 3 drinks a month now\".  Drug use: Yes       Types: Marijuana       Comment: uses marijuana every day, stopped Xanax 6/17/19.  Sexual activity: Not Currently   Lifestyle    Physical activity       Days per week: None       Minutes per session: None    Stress: None   Relationships    Social connections       Talks on phone: None       Gets together: None       Attends Hinduism service: None       Active member of club or organization: None       Attends meetings of clubs or organizations: None       Relationship status: None    Intimate partner violence       Fear of current or ex partner: None       Emotionally abused: None       Physically abused: None       Forced sexual activity: None   Other Topics Concern    None   Social History Narrative     PREVIOUS MEDICATION TRIALS     Ambien     Effexor XR (current, 150mg)     Prozac (several years, 40mg, doesn't remember why she stopped)     Elavil     Zoloft (several years, doesn't remember the dose, stopped taking because she was pregnant)     Xanax           No negative effect with taking SSRI's           MSE:  Patient is  A & O x 4. Appearance:  JOY. Cognition:  Recent memory intact , remote memory intact , good fund of knowledge, average  intelligence level. Speech:  normal  Language: Naming: intact;  Word Finding: intact  Conversation no evidence of delusions  Behavior:  Cooperative  Mood: \"pretty good\"  Affect: congruent with mood  Thought Content: negative delusions, negative hallucinations, negative obsessions,  negativehomicidal and negative suicidal   Thought Process: linear, goal directed and coherent  Associations: logical connections  Attention Span and concentration: Normal   Judgement Insight:  normal and appropriate  Gait and 150 MG extended release capsule       Sig: Take 1 capsule by mouth daily       Dispense:  90 capsule       Refill:  0       She adds this to 150mg for a 225mg dose. No orders of the defined types were placed in this encounter.       9. Additional comments: She reports that her medications continue to work. She is having some sleep issues with not being able to fall asleep. She is going to try taking Melatonin for sleep initiation. Also reviewed with her Circadian Rhythm and light stimulation. She is going back to work next week and she thinks this may help with sleep. Will include sleep hygiene tips with her AVS. If these things do not work, may consider increasing Amitriptyline. Will make no other changes and will follow up in about 3 months. She says that she has a custody hearing coming up which, if it doesn't go well, may be upsetting. She was encouraged to call sooner if her mood declines or anxiety increases. She verbalized understanding.     10. Over 50% of the total visit time of 25  minutes was spent on counseling and/or coordination of care of:                         1. Moderate episode of recurrent major depressive disorder ([facility] [address]    2. Generalized anxiety disorder with panic attacks    3. Insomnia due to other mental disorder    4.  Poor concentration                       Psychotherapy Topics: mood/medication effectiveness         [facility] Buchanan [facility]

## 2020-08-03 ENCOUNTER — TELEPHONE (OUTPATIENT)
Dept: PSYCHIATRY | Age: 40
End: 2020-08-03

## 2020-08-31 RX ORDER — AMITRIPTYLINE HYDROCHLORIDE 25 MG/1
25 TABLET, FILM COATED ORAL NIGHTLY
Qty: 90 TABLET | Refills: 1 | Status: SHIPPED | OUTPATIENT
Start: 2020-08-31 | End: 2021-03-16

## 2020-08-31 RX ORDER — ATORVASTATIN CALCIUM 80 MG/1
80 TABLET, FILM COATED ORAL NIGHTLY
Qty: 90 TABLET | Refills: 4 | Status: SHIPPED | OUTPATIENT
Start: 2020-08-31 | End: 2021-09-13

## 2020-08-31 NOTE — TELEPHONE ENCOUNTER
Pharmacy requesting refill request for patient Sandro Sabillon      Last office visit : 6/4/2020   Next office visit : 9/4/2020     Requested Prescriptions     Pending Prescriptions Disp Refills    amitriptyline (ELAVIL) 25 MG tablet [Pharmacy Med Name: Amitriptyline HCl 25 MG Oral Tablet] 30 tablet 0     Sig: Take 1 tablet by mouth nightly            Clement Shane  Virtual Visit     6/4/2020  [address] [facility]   GILMA Delgado - NP   Nurse Practitioner Psychiatric/Mental Health   Moderate episode of recurrent major depressive disorder ([facility] [address] +3 more   Dx   Follow-up , Anxiety , Depression , Medication Check   Reason for Visit    Progress Notes              Scan on 6/4/2020 4:03 PM by Estrella Sauer MA: PHQ-[id number] [email or web address] on 6/4/2020 4:03 PM by Estrella Sauer MA: PHQ-9 CANDE-7    Progress Notes     Expand All Collapse All  []Expand All by Default  Sandro Sabillon is a 44 y.o. female evaluated via telephone on 6/4/2020.       Consent:  She and/or health care decision maker is aware that that she may receive a bill for this telephone service, depending on her insurance coverage, and has provided verbal consent to proceed: Yes        Documentation:  I communicated with the patient and/or health care decision maker about depression, anxiety, medication check.    Details of this discussion including any medical advice provided: see below        I affirm this is a Patient Initiated Episode with a Patient who has not had a related appointment within my department in the past 7 days or scheduled within the next 24 hours.     Patient identification was verified at the start of the visit: Yes     Total Time: minutes: 21-30 minutes     Note: not billable if this call serves to triage the patient into an appointment for the relevant concern        Megan Bakerjerald      6/4/2020 4:10 PM                               Progress Note     IN:  Jose Guadalupe Coker  OUT: Radha 1980                                 Chief Complaint   Patient presents with    Follow-up    Anxiety    Depression    Medication Check           Subjective:  Patient is a 44 y.o. female diagnosed with MDD and presents today for follow-up. Last seen in clinic on 3/5/20 and prior records were reviewed.     Today patient states, \"Yeah, I'm struggling a little bit with sleep. \" She says that she is working from home and goes back to work next week. She says that the month of April was stressful but that the workload has settled some. She continues to see GALI Gonzalez for therapy.     Patient reports side effects as follows: none. No evidence of EPS, no cogwheeling or abnormal motor movements.     Absent  suicidal ideation.   Reports compliance with medications as good .      Current Substance Use:  See history     BP: There were no vitals taken for this visit.        Review of Systems - 14 point review:  Negative except being treated for: psoriasis, hx of MI, HTN, anxiety, depression, insomnia, cannabis dependence        Constitutional: (fevers, chills, night sweats, wt loss/gain, change in appetite, fatigue, somnolence)     HEENT: (ear pain or discharge, hearing loss, ear ringing, sinus pressure, nosebleed, nasal discharge, sore throat, oral sores, tooth pain, bleeding gums, hoarse voice, neck pain)      Cardiovascular: (HTN, chest pain, elevated cholesterol/lipids, palpitations, leg swelling, leg pain with walking)     Respiratory: (cough, wheezing, snoring, SOB with activity (dyspnea), SOB while lying flat (orthopnea), awakening with severe SOB (paroxysmal nocturnal dyspnea))     Gastrointestinal: (NVD, constipation, abdominal pain, bright red stools, black tarry stools, stool incontinence)     Genitourinary:  (pelvic pain, burning or frequency of urination, urinary urgency, blood in urine incomplete bladder emptying, urinary incontinence, STD; MEN: testicular pain or swelling, erectile dysfunction; WOMEN: LMP, heavy menstrual bleeding (menorrhagia), irregular periods, postmenopausal bleeding, menstrual pain (dymenorrhea, vaginal discharge)     Musculoskeletal: (bone pain/fracture, joint pain or swelling, musle pain)     Integumentary: (rashes, acne, non-healing sores, itching, breast lumps, breast pain, nipple discharge, hair loss)     Neurologic: (HA, muscle weakness, paresthesias (numbness, coldness, crawling or prickling), memory loss, seizure, dizziness)     Psychiatric:  (anxiety, sadness, irritability/anger, insomnia, suicidality)     Endocrine: (heat or cold intolerance, excessive thirst (polydipsia), excessive hunger (polyphagia))     Immune/Allergic: (hives, seasonal or environmental allergies, HIV exposure)     Hematologic/Lymphatic: (lymph node enlargement, easy bleeding or bruising)     History obtained via chart review and patient     PCP is Gwen Layne, DO         Current Meds:     Home Medications           Prior to Admission medications    Medication Sig Start Date End Date Taking?  Authorizing Provider   venlafaxine (EFFEXOR XR) 150 MG extended release capsule Take 1 capsule by mouth daily 6/4/20   Yes Clearnce Handler, APRN - NP   amitriptyline (ELAVIL) 25 MG tablet Take 1 tablet by mouth nightly 5/4/20     Clearnce Handler, APRN - NP   busPIRone (BUSPAR) 15 MG tablet Take 30 mg by mouth 2 times daily 3/5/20     Clearnce Handler, APRN - NP   cloNIDine (CATAPRES) 0.1 MG tablet Take 1 tablet by mouth nightly 3/5/20     Clearnce Handler, APRN - NP   venlafaxine (EFFEXOR XR) 75 MG extended release capsule Take 1 capsule by mouth daily 10/30/19     Clearnce Handler, APRN - NP   adalimumab (HUMIRA) 40 MG/0.8ML injection Inject 40 mg into the skin every 14 days Indications: Psoriasis       Historical Provider, MD   metoprolol tartrate (LOPRESSOR) 25 MG tablet 25 mg nightly  4/1/19     Historical Provider, MD   atorvastatin (LIPITOR) 80 MG tablet Take by mouth nightly  3/28/19     Historical Provider, MD   BRILINTA 90 MG TABS tablet Take 90 mg by mouth nightly  4/1/19     Historical Provider, MD   ondansetron (ZOFRAN-ODT) 4 MG disintegrating tablet Take 4 mg by mouth every 6 hours as needed for Nausea or Vomiting       Historical Provider, MD   aspirin 81 MG tablet Take 81 mg by mouth daily       Historical Provider, MD        Social History   Social History            Socioeconomic History    Marital status: Single       Spouse name: None    Number of children: 2    Years of education: assoc degree    Highest education level: None   Occupational History    None   Social Needs    Financial resource strain: None    Food insecurity       Worry: None       Inability: None    Transportation needs       Medical: None       Non-medical: None   Tobacco Use    Smoking status: Current Every Day Smoker       Packs/day: 1.00       Start date: [address]    Smokeless tobacco: Never Used    Tobacco comment: 6/5/19 pt given in on smoking and smoking cessation. Substance and Sexual Activity    Alcohol use: Yes       Alcohol/week: 2.0 standard drinks       Types: 1 Glasses of wine, 1 Shots of liquor per week       Frequency: Monthly or less       Drinks per session: 1 or 2       Comment: \"I used to drink a lot. I drink 2 to 3 drinks a month now\".  Drug use: Yes       Types: Marijuana       Comment: uses marijuana every day, stopped Xanax 6/17/19.      Sexual activity: Not Currently   Lifestyle    Physical activity       Days per week: None       Minutes per session: None    Stress: None   Relationships    Social connections       Talks on phone: None       Gets together: None       Attends Cheondoism service: None       Active member of club or organization: None       Attends meetings of clubs or organizations: None       Relationship status: None    Intimate partner violence       Fear of current or ex partner: None       Emotionally abused: None       Physically abused: None       Forced sexual activity: None   Other Topics Concern    None   Social History Narrative     PREVIOUS MEDICATION TRIALS     Ambien     Effexor XR (current, 150mg)     Prozac (several years, 40mg, doesn't remember why she stopped)     Elavil     Zoloft (several years, doesn't remember the dose, stopped taking because she was pregnant)     Xanax           No negative effect with taking SSRI's           MSE:  Patient is  A & O x 4. Appearance:  JOY. Cognition:  Recent memory intact , remote memory intact , good fund of knowledge, average  intelligence level. Speech:  normal  Language: Naming: intact; Word Finding: intact  Conversation no evidence of delusions  Behavior:  Cooperative  Mood: \"pretty good\"  Affect: congruent with mood  Thought Content: negative delusions, negative hallucinations, negative obsessions,  negativehomicidal and negative suicidal   Thought Process: linear, goal directed and coherent  Associations: logical connections  Attention Span and concentration: Normal   Judgement Insight:  normal and appropriate  Gait and Station:JOY   Sleep: avg 6 hrs (is having problems with sleep initiation)  Appetite: ok        No results found for: NA, K, CL, CO2, BUN, CREATININE, GLUCOSE, CALCIUM, PROT, LABALBU, BILITOT, ALKPHOS, AST, ALT, LABGLOM, GFRAA, AGRATIO, GLOB  No results found for: NA, K, CL, CO2, BUN, CREATININE, GLUCOSE, CALCIUM   No results found for: CHOL  No results found for: TRIG  No results found for: HDL  No results found for: LDLCHOLESTEROL, LDLCALC  No results found for: LABVLDL, VLDL  No results found for: CHOLHDLRATIO  No results found for: LABA1C  No results found for: EAG  No results found for: TSHFT4, TSH  No results found for: VITD25     Assessments Administered:     PHQ9: 10, moderate   GAD7:   9, mild       Assessment:    1. Moderate episode of recurrent major depressive disorder ([facility] [address]    2. Generalized anxiety disorder with panic attacks    3. Insomnia due to other mental disorder    4.  Poor concentration         Plan:  Continue:  Effexor XR, 225mg, daily  Buspirone, 15mg, 2 tabs twice daily  Amitriptyline, 25mg, nightly  Clonidine, 0.1mg, nightly (for focus, and anxiety)     Start:  Melatonin, 3mg up to 10mg, nightly as needed for sleep initiation     Follow up: Return in about 3 months (around 9/4/2020).     1. The risks, benefits, side effects, indications, contraindications, and adverse effects of the medications have been discussed. Yes.  2. The pt has verbalized understanding and has capacity to give informed consent. 3. The [employer] report has been reviewed according to [facility] regulations. 4. Supportive therapy offered. 5. Follow up:    Return in about 3 months (around 9/4/2020). 6. The patient has been advised to call with any problems. 7. Controlled substance Treatment Plan: none. 8. The above listed medications have been continued, modifications in meds and other orders/labs as follows:                 Encounter Medications         Orders Placed This Encounter   Medications    venlafaxine (EFFEXOR XR) 150 MG extended release capsule       Sig: Take 1 capsule by mouth daily       Dispense:  90 capsule       Refill:  0       She adds this to 150mg for a 225mg dose. No orders of the defined types were placed in this encounter.       9. Additional comments: She reports that her medications continue to work. She is having some sleep issues with not being able to fall asleep. She is going to try taking Melatonin for sleep initiation. Also reviewed with her Circadian Rhythm and light stimulation. She is going back to work next week and she thinks this may help with sleep. Will include sleep hygiene tips with her AVS. If these things do not work, may consider increasing Amitriptyline. Will make no other changes and will follow up in about 3 months. She says that she has a custody hearing coming up which, if it doesn't go well, may be upsetting. She was encouraged to call sooner if her mood declines or anxiety increases. She verbalized understanding.     10. Over 50% of the total visit time of 25  minutes was spent on counseling and/or coordination of care of:                         1. Moderate episode of recurrent major depressive disorder ([facility] [address]    2. Generalized anxiety disorder with panic attacks    3. Insomnia due to other mental disorder    4. Poor concentration                       Psychotherapy Topics: mood/medication effectiveness         [facility]      Instructions         Return in about 3 months (around 9/4/2020). Plan:  Continue:  Effexor XR, 225mg, daily  Buspirone, 15mg, 2 tabs twice daily  Amitriptyline, 25mg, nightly  Clonidine, 0.1mg, nightly (for focus, and anxiety)     Start:  Melatonin, 3mg up to 10mg, nightly as needed for sleep initiation     Follow up: Return in about 3 months (around 9/4/2020).    SLEEP HYGIENE     Avoid caffeine in the late afternoon and evening.     Do 30 min of exercise (such as walking) in the evening (but not before bedtime). When you are physically tired you are better able to go to sleep.     Start dimming the lights an hour prior to bedtime. Reduce stress. Do stress reduction activities before bedtime.     Attempt to go to bed and get up in the morning at the same time every day, even on the weekends.     Use bed/bedroom for sleep or sex only.     Never lie in bed for more than 15 minutes if not able to sleep.     Discontinue use of devices an hour prior to bedtime. Anything with \"blue\" light disrupts sleep and the production of melatonin. This includes the television, computer, phones.     Use relaxation techniques such as listening to wave sounds or thunderstorm sounds. These can be found on [employer] and they are available with a blacked out screen.     Use relaxation exercises such as deep breathing or tensing/relaxing your muscles to assist with sleep. Relaxation exercises can also be found on YouTube.     Deep breathing can be used anytime to relieve anxiety.  Take a deep breath in, hold for as long as you can

## 2020-09-04 ENCOUNTER — VIRTUAL VISIT (OUTPATIENT)
Dept: PSYCHIATRY | Age: 40
End: 2020-09-04
Payer: COMMERCIAL

## 2020-09-04 PROCEDURE — 99443 PR PHYS/QHP TELEPHONE EVALUATION 21-30 MIN: CPT | Performed by: NURSE PRACTITIONER

## 2020-09-04 RX ORDER — BUSPIRONE HYDROCHLORIDE 30 MG/1
30 TABLET ORAL 2 TIMES DAILY
Qty: 180 TABLET | Refills: 1 | Status: SHIPPED | OUTPATIENT
Start: 2020-09-04 | End: 2021-04-12 | Stop reason: SDUPTHER

## 2020-09-04 RX ORDER — CLONIDINE HYDROCHLORIDE 0.1 MG/1
0.1 TABLET ORAL NIGHTLY
Qty: 90 TABLET | Refills: 1 | Status: SHIPPED | OUTPATIENT
Start: 2020-09-04 | End: 2021-04-19

## 2020-09-04 RX ORDER — VENLAFAXINE HYDROCHLORIDE 150 MG/1
150 CAPSULE, EXTENDED RELEASE ORAL DAILY
Qty: 90 CAPSULE | Refills: 1 | Status: SHIPPED | OUTPATIENT
Start: 2020-09-04 | End: 2020-11-23

## 2020-09-04 NOTE — PATIENT INSTRUCTIONS
Plan:  Continue:  Effexor XR, 225mg, daily  Buspirone, 30mg, twice daily  Amitriptyline, 25mg, nightly  Clonidine, 0.1mg, nightly (for focus, and anxiety)  Melatonin, 3mg up to 10mg, nightly as needed for sleep initiation    Follow up: Return in about 3 months (around 12/4/2020).

## 2020-09-04 NOTE — PROGRESS NOTES
Leopoldo Hock is a 36 y.o. female evaluated via telephone on 9/4/2020. Consent:  She and/or health care decision maker is aware that that she may receive a bill for this telephone service, depending on her insurance coverage, and has provided verbal consent to proceed: Yes      Documentation:  I communicated with the patient and/or health care decision maker about anxiety/depression. Details of this discussion including any medical advice provided: see below      I affirm this is a Patient Initiated Episode with a Patient who has not had a related appointment within my department in the past 7 days or scheduled within the next 24 hours. Patient identification was verified at the start of the visit: Yes    Total Time: minutes: 21-30 minutes    Note: not billable if this call serves to triage the patient into an appointment for the relevant concern      Tramaine Pate       9/4/2020 1:03 PM   Progress Note    IN:  [id number]  OUT: [address] [facility] 1980      Chief Complaint   Patient presents with    Follow-up    Anxiety    Depression         Subjective:  Patient is a 36 y.o. female diagnosed with MDD and presents today for follow-up. Last seen in clinic on 6/4/20 and prior records were reviewed. Today patient states, \"I'm doing pretty good, a little up and down, overall better. \" She reports that she has gotten things done around the house and feels good about this. She still reports going back and forth with her ex-. She reports anxiety around this. She has some anxiety around her ex-'s new fiance because the woman has done sex videos and there was a man sitting at her ex-'s house while her child was there. Patient reports side effects as follows: none. No evidence of EPS, no cogwheeling or abnormal motor movements. Absent  suicidal ideation. Reports compliance with medications as good .      Current Substance Use:  See history    BP: There were no vitals taken for this visit.       Review of Systems - 14 point review:  Negative except being treated for: psoriasis, hx of MI, HTN, anxiety, depression, insomnia, cannabis dependence      Constitutional: (fevers, chills, night sweats, wt loss/gain, change in appetite, fatigue, somnolence)    HEENT: (ear pain or discharge, hearing loss, ear ringing, sinus pressure, nosebleed, nasal discharge, sore throat, oral sores, tooth pain, bleeding gums, hoarse voice, neck pain)      Cardiovascular: (HTN, chest pain, elevated cholesterol/lipids, palpitations, leg swelling, leg pain with walking)    Respiratory: (cough, wheezing, snoring, SOB with activity (dyspnea), SOB while lying flat (orthopnea), awakening with severe SOB (paroxysmal nocturnal dyspnea))    Gastrointestinal: (NVD, constipation, abdominal pain, bright red stools, black tarry stools, stool incontinence)     Genitourinary:  (pelvic pain, burning or frequency of urination, urinary urgency, blood in urine incomplete bladder emptying, urinary incontinence, STD; MEN: testicular pain or swelling, erectile dysfunction; WOMEN: LMP, heavy menstrual bleeding (menorrhagia), irregular periods, postmenopausal bleeding, menstrual pain (dymenorrhea, vaginal discharge)    Musculoskeletal: (bone pain/fracture, joint pain or swelling, musle pain)    Integumentary: (rashes, acne, non-healing sores, itching, breast lumps, breast pain, nipple discharge, hair loss)    Neurologic: (HA, muscle weakness, paresthesias (numbness, coldness, crawling or prickling), memory loss, seizure, dizziness)    Psychiatric:  (anxiety, sadness, irritability/anger, insomnia, suicidality)    Endocrine: (heat or cold intolerance, excessive thirst (polydipsia), excessive hunger (polyphagia))    Immune/Allergic: (hives, seasonal or environmental allergies, HIV exposure)    Hematologic/Lymphatic: (lymph node enlargement, easy bleeding or bruising)    History obtained via chart review and patient    PCP is Candace Vargas DO cessation. Substance and Sexual Activity    Alcohol use: Yes     Alcohol/week: 2.0 standard drinks     Types: 1 Glasses of wine, 1 Shots of liquor per week     Frequency: Monthly or less     Drinks per session: 1 or 2     Comment: \"I used to drink a lot. I drink 2 to 3 drinks a month now\".  Drug use: Yes     Types: Marijuana     Comment: uses 9/4/20 marijuana every day, stopped Xanax 6/17/19.  Sexual activity: Not Currently     Comment: 9/4/20 no birth control, not sexually active   Lifestyle    Physical activity     Days per week: None     Minutes per session: None    Stress: None   Relationships    Social connections     Talks on phone: None     Gets together: None     Attends Sabianism service: None     Active member of club or organization: None     Attends meetings of clubs or organizations: None     Relationship status: None    Intimate partner violence     Fear of current or ex partner: None     Emotionally abused: None     Physically abused: None     Forced sexual activity: None   Other Topics Concern    None   Social History Narrative    PREVIOUS MEDICATION TRIALS    Ambien    Effexor XR (current, 150mg)    Prozac (several years, 40mg, doesn't remember why she stopped)    Elavil    Zoloft (several years, doesn't remember the dose, stopped taking because she was pregnant)    Xanax         No negative effect with taking SSRI's       MSE:  Patient is  A & O x 4. Appearance:  JOY. Cognition:  Recent memory intact , remote memory intact , good fund of knowledge, average  intelligence level. Speech:  normal  Language: Naming: intact;  Word Finding: intact  Conversation no evidence of delusions  Behavior:  Cooperative  Mood: \"anxious\"   Affect: congruent with mood  Thought Content: negative delusions, negative hallucinations, negative obsessions,  negativehomicidal and negative suicidal   Thought Process: linear, goal directed and coherent  Associations: logical connections  Attention Span and (EFFEXOR XR) 150 MG extended release capsule     Sig: Take 1 capsule by mouth daily     Dispense:  90 capsule     Refill:  1     She adds this to 150mg for a 225mg dose.  cloNIDine (CATAPRES) 0.1 MG tablet     Sig: Take 1 tablet by mouth nightly     Dispense:  90 tablet     Refill:  1    busPIRone (BUSPAR) 30 MG tablet     Sig: Take 30 mg by mouth 2 times daily     Dispense:  180 tablet     Refill:  1     Fill when due. No orders of the defined types were placed in this encounter. 9. Additional comments: She continues to reports that her medications are working. She reports some anxiety around her ex- the ex-'s fiance and custody arrangements. She says she is using an glenroy called Lestine Small for sleep and that she is sleeping well. She continues to say that Clonidine helps for focus and concentration. She continues to see GALI Gonzalez for therapy. Will make no medication changes today and will follow up in about 3 months. 10.Over 50% of the total visit time of 25  minutes was spent on counseling and/or coordination of care of:                        1. Moderate episode of recurrent major depressive disorder ([facility] [address]    2. CANDE (generalized anxiety disorder)    3. Insomnia due to other mental disorder    4.  Poor concentration                      Psychotherapy Topics: mood/medication effectiveness       Yael Morales PMHNP-BC

## 2020-09-10 ENCOUNTER — VIRTUAL VISIT (OUTPATIENT)
Dept: PSYCHIATRY | Age: 40
End: 2020-09-10
Payer: COMMERCIAL

## 2020-09-10 PROCEDURE — 98968 PH1 ASSMT&MGMT NQHP 21-30: CPT | Performed by: SOCIAL WORKER

## 2020-09-10 NOTE — PATIENT INSTRUCTIONS
signs of emotional abuse. 1. They humiliate you, put you down, or make fun of you in front of other people. 2. They regularly demean or disregard your opinions, ideas, suggestions, or needs. 3. They use sarcasm or teasing to put you down or make you feel bad about yourself. 4. They accuse you of being too sensitive in order to deflect their abusive remarks. 5. They try to control you and treat you like a child. 6. They correct or chastise you for your behavior. 7. You feel like you need permission to make decisions or go out somewhere. 8. They try to control the finances and how you spend money. 9. They belittle and trivialize you, your accomplishments, or your hopes and dreams. 10. They try to make you feel as though they are always right, and you are wrong. 11. They give you disapproving or contemptuous looks or body language. 12. They regularly point out your flaws, mistakes, or shortcomings. 13. They accuse or blame you of things you know arent true. 14. They have an inability to laugh at themselves and cant tolerate others laughing at them. 15. They are intolerant of any seeming lack of respect. 16. They make excuses for their behavior, try to blame others, and have difficulty apologizing. 17. The repeatedly cross your boundaries and ignore your requests. 18. They blame you for their problems, life difficulties, or unhappiness. 19. They call you names, give you unpleasant labels, or make cutting remarks under their breath. 20. They are emotionally distant or emotionally unavailable most of the time. 21. They resort to pouting or withdrawal to get attention or attain what they want. 22. They dont show you empathy or compassion. 23. They play the victim and try to deflect blame to you rather than taking personal responsibility. 24. They disengage or use neglect or abandonment to punish or frighten you. 25. They dont seem to notice or care about your feelings.   26. They view you as an extension of themselves rather than as an individual.  32. They withhold sex as a way to manipulate and control. 28. They share personal information about you with others. 29. They invalidate or deny their emotionally abusive behavior when confronted. 30. They make subtle threats or negative remarks with the intent to frighten or control you. The first step for those being emotionally abused is recognizing its happening. If you recognize any of the signs of emotional abuse in your relationship, you need to be honest with yourself so you can regain power over your own life, stop the abuse, and begin to heal. For those whove been minimizing, denying, and hiding the abuse, this can be a painful and frightening first step. The stress of emotional abuse will eventually catch up with you in the form of illness, emotional trauma, depression, or anxiety. You simply cant allow it to continue, even if it means ending the relationship. A licensed counselor who is trained in abusive relationships can help you navigate the pain and fears of leaving the relationship and work with you to rebuild your self-esteem. What is mental health or emotional health? Mental or emotional health refers to your overall psychological well-being. It includes the way you feel about yourself, the quality of your relationships, and your ability to manage your feelings and deal with difficulties. Good mental health isn't just the absence of mental health problems. Being mentally or emotionally healthy is much more than being free of depression, anxiety, or other psychological issues. Rather than the absence of mental illness, mental and emotional health refers to the presence of positive characteristics. Similarly, not feeling bad is not the same as feeling good. While some people may not have negative feelings, they still need to do things that make them feel positive in order to achieve mental and emotional health.     People who are mentally and emotionally healthy have:  A sense of contentment   A zest for living and the ability to laugh and have fun   The ability to deal with stress and bounce back from adversity   A sense of meaning and purpose, in both their activities and their relationships   The flexibility to learn new things and adapt to change   A balance between work and play, rest and activity, etc.   The ability to build and maintain fulfilling relationships   Self-confidence and high self-esteem  These positive characteristics of mental and emotional health allow you to participate in life to the fullest extent possible through productive, meaningful activities and strong relationships. These positive characteristics also help you cope when faced with life's challenges and stresses. The role of resilience in mental and emotional health  Being emotionally and mentally healthy doesnt mean never going through bad times or experiencing emotional problems. We all go through disappointments, loss, and change. And while these are normal parts of life, they can still cause sadness, anxiety, and stress. The difference is that people with good emotional health have an ability to bounce back from adversity, trauma, and stress. This ability is called resilience. People who are emotionally and mentally healthy have the tools for coping with difficult situations and maintaining a positive outlook. They remain focused, flexible, and creative in bad times as well as good. One of the key factors in resilience is the ability to balance stress and your emotions. The capacity to recognize your emotions and express them appropriately helps you avoid getting stuck in depression, anxiety, or other negative mood states. Another key factor is having a strong support network. Having trusted people you can turn to for encouragement and support will boost your resilience in tough times. Supportive relationships:  The foundation of emotional health  No matter how much time you devote to improving your mental and emotional health, you will still need the company of others to feel and be your best. Humans are social creatures with an emotional need for relationships and positive connections to others. Were not meant to survive, let alone thrive, in isolation. Our social brains crave companionship--even when experience has made us shy and distrustful of others. Social interaction--specifically talking to someone else about your problems--can also help to reduce stress. The key is to find a supportive relationship with someone who is a good listener--someone you can talk to regularly, preferably face-to-face, who will listen to you without a pre-existing agenda for how you should think or feel. A good listener will listen to the feelings behind your words, and wont interrupt or  or criticize you. The best way to find a good listener? Be a good listener yourself. Develop a friendship with someone you can talk to regularly, and then listen and support each other. Tips and strategies for connecting to others:  Get out from behind your TV or computer screen. Screens have their place but they will never have the same effect as an expression of interest or a reassuring touch. Communication is a largely nonverbal experience that requires you to be in direct contact with other people, so dont neglect your real-world relationships in favor of virtual interaction. Spend time daily, face-to-face, with people you like. Make spending time with people you enjoy a priority. Choose friends, neighbors, colleagues, and family members who are upbeat, positive, and interested in you. Take time to inquire about people you meet during the day that you like. Volunteer. Doing something that helps others has a beneficial effect on how you feel about yourself. The meaning and purpose you find in helping others will enrich and expand your life.  There is no limit to

## 2020-10-02 ENCOUNTER — VIRTUAL VISIT (OUTPATIENT)
Dept: PSYCHIATRY | Age: 40
End: 2020-10-02
Payer: COMMERCIAL

## 2020-10-02 PROCEDURE — 98968 PH1 ASSMT&MGMT NQHP 21-30: CPT | Performed by: SOCIAL WORKER

## 2020-10-02 SDOH — ECONOMIC STABILITY: FOOD INSECURITY: WITHIN THE PAST 12 MONTHS, YOU WORRIED THAT YOUR FOOD WOULD RUN OUT BEFORE YOU GOT MONEY TO BUY MORE.: NEVER TRUE

## 2020-10-02 SDOH — ECONOMIC STABILITY: HOUSING INSECURITY
IN THE LAST 12 MONTHS, WAS THERE A TIME WHEN YOU DID NOT HAVE A STEADY PLACE TO SLEEP OR SLEPT IN A SHELTER (INCLUDING NOW)?: NO

## 2020-10-02 SDOH — ECONOMIC STABILITY: INCOME INSECURITY: IN THE LAST 12 MONTHS, WAS THERE A TIME WHEN YOU WERE NOT ABLE TO PAY THE MORTGAGE OR RENT ON TIME?: NO

## 2020-10-02 SDOH — ECONOMIC STABILITY: TRANSPORTATION INSECURITY
IN THE PAST 12 MONTHS, HAS THE LACK OF TRANSPORTATION KEPT YOU FROM MEDICAL APPOINTMENTS OR FROM GETTING MEDICATIONS?: NO

## 2020-10-02 SDOH — ECONOMIC STABILITY: TRANSPORTATION INSECURITY
IN THE PAST 12 MONTHS, HAS LACK OF TRANSPORTATION KEPT YOU FROM MEETINGS, WORK, OR FROM GETTING THINGS NEEDED FOR DAILY LIVING?: NO

## 2020-10-02 SDOH — ECONOMIC STABILITY: HOUSING INSECURITY: IN THE LAST 12 MONTHS, HOW MANY PLACES HAVE YOU LIVED?: 1

## 2020-10-02 SDOH — ECONOMIC STABILITY: FOOD INSECURITY: WITHIN THE PAST 12 MONTHS, THE FOOD YOU BOUGHT JUST DIDN'T LAST AND YOU DIDN'T HAVE MONEY TO GET MORE.: NEVER TRUE

## 2020-10-02 SDOH — ECONOMIC STABILITY: INCOME INSECURITY: HOW HARD IS IT FOR YOU TO PAY FOR THE VERY BASICS LIKE FOOD, HOUSING, MEDICAL CARE, AND HEATING?: NOT HARD AT ALL

## 2020-10-02 NOTE — PROGRESS NOTES
Jose Khan is a 36 y.o. female evaluated via telephone on 10/2/2020. Consent:  She and/or health care decision maker is aware that that she may receive a bill for this telephone service, depending on her insurance coverage, and has provided verbal consent to proceed: NA - Consent obtained within past 12 months      Documentation:  I communicated with the patient and/or health care decision maker about see therapy progress note. Details of this discussion including any medical advice provided: see therapy progress note. I affirm this is a Patient Initiated Episode with a Patient who has not had a related appointment within my department in the past 7 days or scheduled within the next 24 hours. Patient identification was verified at the start of the visit: Yes    Total Time: minutes: 21-30 minutes    Note: not billable if this call serves to triage the patient into an appointment for the relevant concern      Amanda Monroy     Therapy Progress Note  Amanda Monroy MSW, LCSW  10/2/2020  10:00 AM  11:00 AM    Patient location  : home  [facility] location : [address] [place] [address]      Time spent with Patient: 60 minutes  This is patient's tenth  Therapy appointment. Reason for Consult:  depression and anxiety  Referring Provider: No referring provider defined for this encounter. Jose Khan ,a 36 y.o. female, for initial evaluation visit. Pt provided informed consent for the behavioral health program. Discussed with patient model of service to include the limits of confidentiality (i.e. abuse reporting, suicide intervention, etc.) and short-term intervention focused approach. Discussed no show and late cancellation policy. Pt indicated understanding. S:  Pt reported child custody is still as bad and shared details of multiple situations and events that has been happening since last appointment.  Pt reported her ex- has now been denied emergency custody twice by two different judges, and has called [facility] for the second time. Pt reported she would like to continue the 4 weeks appointments and verbalized she would call sooner if needed. Pt reported next court date is November 4th 2020. Pt denies Suicidal Ideations, Homicidal Ideation, Auditory Hallucinations, Visual Hallucinations, Tactical Hallucinations. MSE:    Sounded    alert, cooperative, moderate distress  Appetite normal  Sleep disturbance No  Fatigue No  Loss of pleasure No  Impulsive behavior No  Speech    normal rate and normal volume  Mood    Anxious  Angry    Thought Content    cognitive distortions and all or nothing thinking  Thought Process    circumstantial  Associations    logical connections  Insight    Fair  Judgment    Intact  Orientation    oriented to person, place, time, and general circumstances  Memory    recent and remote memory intact  Attention/Concentration    intact  Morbid ideation No  Suicide Assessment    no suicidal ideation      History:  Social History     Socioeconomic History    Marital status: Single     Spouse name: Not on file    Number of children: 2    Years of education: assoc degree    Highest education level: Not on file   Occupational History    Not on file   Social Needs    Financial resource strain: Not on file    Food insecurity     Worry: Not on file     Inability: Not on file   [employer] needs     Medical: Not on file     Non-medical: Not on file   Tobacco Use    Smoking status: Current Every Day Smoker     Packs/day: 1.00     Start date: 1996    Smokeless tobacco: Never Used    Tobacco comment: 6/5/19 pt given in on smoking and smoking cessation. Substance and Sexual Activity    Alcohol use: Yes     Alcohol/week: 2.0 standard drinks     Types: 1 Glasses of wine, 1 Shots of liquor per week     Frequency: Monthly or less     Drinks per session: 1 or 2     Comment: \"I used to drink a lot. I drink 2 to 3 drinks a month now\".     Drug use: Yes     Types: Marijuana     Comment: uses 9/4/20 marijuana every day, stopped Xanax 6/17/19.      Sexual activity: Not Currently     Comment: 9/4/20 no birth control, not sexually active   Lifestyle    Physical activity     Days per week: Not on file     Minutes per session: Not on file    Stress: Not on file   Relationships    Social connections     Talks on phone: Not on file     Gets together: Not on file     Attends Anglican service: Not on file     Active member of club or organization: Not on file     Attends meetings of clubs or organizations: Not on file     Relationship status: Not on file    Intimate partner violence     Fear of current or ex partner: Not on file     Emotionally abused: Not on file     Physically abused: Not on file     Forced sexual activity: Not on file   Other Topics Concern    Not on file   Social History Narrative    PREVIOUS MEDICATION TRIALS    Ambien    Effexor XR (current, 150mg)    Prozac (several years, 40mg, doesn't remember why she stopped)    Elavil    Zoloft (several years, doesn't remember the dose, stopped taking because she was pregnant)    Xanax         No negative effect with taking SSRI's       Medications:   Current Outpatient Medications   Medication Sig Dispense Refill    venlafaxine (EFFEXOR XR) 150 MG extended release capsule Take 1 capsule by mouth daily 90 capsule 1    cloNIDine (CATAPRES) 0.1 MG tablet Take 1 tablet by mouth nightly 90 tablet 1    busPIRone (BUSPAR) 30 MG tablet Take 30 mg by mouth 2 times daily 180 tablet 1    amitriptyline (ELAVIL) 25 MG tablet Take 1 tablet by mouth nightly 90 tablet 1    venlafaxine (EFFEXOR XR) 75 MG extended release capsule Take 1 capsule by mouth daily (with 150mg to make a 225mg dose) 60 capsule 1    adalimumab (HUMIRA) 40 MG/0.8ML injection Inject 40 mg into the skin every 14 days Indications: Psoriasis      metoprolol tartrate (LOPRESSOR) 25 MG tablet 25 mg nightly   11    atorvastatin (LIPITOR) 80 MG tablet Take by mouth nightly   11    BRILINTA 90 MG TABS tablet Take 90 mg by mouth nightly   11    ondansetron (ZOFRAN-ODT) 4 MG disintegrating tablet Take 4 mg by mouth every 6 hours as needed for Nausea or Vomiting      aspirin 81 MG tablet Take 81 mg by mouth daily       No current facility-administered medications for this visit. Social History:   Social History     Socioeconomic History    Marital status: Single     Spouse name: Not on file    Number of children: 2    Years of education: assoc degree    Highest education level: Not on file   Occupational History    Not on file   Social Needs    Financial resource strain: Not on file    Food insecurity     Worry: Not on file     Inability: Not on file   [employer] needs     Medical: Not on file     Non-medical: Not on file   Tobacco Use    Smoking status: Current Every Day Smoker     Packs/day: 1.00     Start date: 1996    Smokeless tobacco: Never Used    Tobacco comment: 6/5/19 pt given in on smoking and smoking cessation. Substance and Sexual Activity    Alcohol use: Yes     Alcohol/week: 2.0 standard drinks     Types: 1 Glasses of wine, 1 Shots of liquor per week     Frequency: Monthly or less     Drinks per session: 1 or 2     Comment: \"I used to drink a lot. I drink 2 to 3 drinks a month now\".  Drug use: Yes     Types: Marijuana     Comment: uses 9/4/20 marijuana every day, stopped Xanax 6/17/19.      Sexual activity: Not Currently     Comment: 9/4/20 no birth control, not sexually active   Lifestyle    Physical activity     Days per week: Not on file     Minutes per session: Not on file    Stress: Not on file   Relationships    Social connections     Talks on phone: Not on file     Gets together: Not on file     Attends Cheondoism service: Not on file     Active member of club or organization: Not on file     Attends meetings of clubs or organizations: Not on file     Relationship status: Not on file    Intimate partner violence     Fear of current or ex partner: Not on file     Emotionally abused: Not on file     Physically abused: Not on file     Forced sexual activity: Not on file   Other Topics Concern    Not on file   Social History Narrative    PREVIOUS MEDICATION TRIALS    Ambien    Effexor XR (current, 150mg)    Prozac (several years, 40mg, doesn't remember why she stopped)    Elavil    Zoloft (several years, doesn't remember the dose, stopped taking because she was pregnant)    Xanax         No negative effect with taking SSRI's       TOBACCO:   reports that she has been smoking. She started smoking about 24 years ago. She has been smoking about 1.00 pack per day. She has never used smokeless tobacco.  ETOH:   reports current alcohol use of about 2.0 standard drinks of alcohol per week. Family History:   Family History   Problem Relation Age of Onset    High Blood Pressure Mother     Depression Mother     Anxiety Disorder Mother     Heart Attack Father     Heart Disease Father     High Cholesterol Father        Diagnosis:    Major depressive disorder; recurrent and mild      Diagnosis Date    CAD (coronary artery disease)     MI about 1 1/2 yrs ago    CHF (congestive heart failure) (HCC)     Hyperlipidemia     Hypertension     kidney stones     VINOD (obstructive sleep apnea)     uses CPAP    Psoriasis     Psychiatric problem     hx of depression and anxiety since a teenager. Problems related to the social environment and Problems related to interaction with the legal system/ crime    Plan:  1. Continue medication management  2. CBT to target cognitive distortions  3. Discuss therapeutic goals  A.  Setting boundaries   B. Assertive Communications    Pt interventions:  Trained in strategies for increasing balanced thinking, Provided education, Discussed self-care (sleep, nutrition, rewarding activities, social support, exercise), Supportive techniques, Emphasized self-care as important for managing overall health and Identified maladaptive

## 2020-11-04 ENCOUNTER — VIRTUAL VISIT (OUTPATIENT)
Dept: PSYCHIATRY | Age: 40
End: 2020-11-04
Payer: COMMERCIAL

## 2020-11-04 PROCEDURE — 98968 PH1 ASSMT&MGMT NQHP 21-30: CPT | Performed by: SOCIAL WORKER

## 2020-11-04 NOTE — PATIENT INSTRUCTIONS
Personal Thought  Control. Our thinking often creates anxiety for us. Getting better control of our thinking can go a long way in helping us cope. The following steps can be useful. 1. Let yourself become aware of thoughts you have when you are anxious. What are the words that you are saying to yourself at that moment? Sometimes it takes a little practice before we become aware of our thoughts. Some examples might be:  I know something bad is going to happen, or This is horrible or Gino Confer is this happening to me!?  2. Write your thoughts down. Its much easier to work with our thoughts, analyze them, and replace them if they are in black and white.   3. Ask yourself the following questions about your thoughts:  a. Is it true? (Is it logically correct? Where is the evidence to support the truth of that thought? Are there alternative ways of thinking that would be more correct?). If a thought is not as true as it could be, replace it with a more realistic and helpful one. The majority of thoughts we have that generate anxiety are not the most realistic appraisals of the situation. b. So what? (If this is logically correct, what does it mean to me? Is there anything I can do about the situation? Is it in my best interest to get anxious about this?). 4. Use coping self-statements. When feeling anxious, you may be able to tell yourself automatic phrases without thinking too much about it. A couple of examples would be phrases such as Its OK, I can handle it, or Ive been through things like this before and have done all right.   Notice that these statements tend to be true for all of us. 5. Notice a change in your emotional state as you change your thinking. As your thoughts become more realistic, you will probably notice a decrease in anxiety and tension, and an increase in your ability to cope.        Autogenic Relaxation    Step 1:  Find a quiet place, away from distractions, and get into a relaxed posture, either sitting or lying down. Step 2:  Close your eyes. Step 3:  Imagine yourself in a comforting, pleasant environment. Step 4:   Say each of these phrases to yourself in a slow, calm, soothing voice, imagining the sensations as you focus on each phrase. Repeat each phrase 3 times and allow about 30 seconds between each repetition to give yourself time to experience the sensations. 1.  My arms are heavy and warm         (repeat 3 times)         2. My face is heavy and warm          (repeat 3 times)     3. My chest is heavy and warm          (repeat 3 times)      4. My legs are heavy and warm          (repeat 3 times)      Step 5:  End the exercise with this suggestion:      When I open my eyes, I will feel refreshed and alert          Here are some additional phrases people have found helpful for autogenic training. You may want to add one or more of these to your own individual autogenic training routine. I am at peace    I am quite quiet    I feel an inward quietness. My mind is quiet     I am beginning to feel quite relaxed. I withdraw my thoughts from the environment and I feel   serene and still. Deep within my mind, I can visualize and experience myself as    relaxed and comfortable and still. My forehead is cool    My heartbeat is calm and regular          How To Question Stressful, Angry, Anxious, or Depressed Thinking    1. Am I upsetting myself unnecessarily? How can I see this another way? 2. Is my thinking working for or against me? How could I view this in a less upsetting way? 3. What am I demanding must happen? What do I want or prefer, rather than need? 4. Am I making something too terrible? Is that awful? What would be so terrible about that? 5. Am I labeling a person? What is the action that I dont like? 6. What is untrue about my thoughts? How can I stick to the facts? 7. Am I using extreme, black-and-white language?  What words might be more accurate? 8. Am I fortune-telling or mind-reading in a way that gets me upset or unhappy? What are the odds  or chances that it will really turn out the way Im thinking or imagining? 9. What are my options in this situation? How would I like to respond? 10. What are more moderate, helpful, or realistic statements to replace the upsetting ones? 11. Have I had any experiences that show that this thought might not be completely true? 12. If my best friend or someone I loved had this thought, what would I tell them? 15. If someone I cared about knew I was thinking this thought, what would they say to me? 14. Are there strengths in me or positives in the situation that I am ignoring? 15. When I am not feeling this way, do I think about this situation any differently? How?  16. Have I been in this type of situation before? What happened? What have I learned from prior experiences that could help me now? 17. Five years from now, if I look back on this situation, will I look at it any differently? Will I focus on any different part of my experience? 25. Am I blaming myself for something over which I do not have complete control? 19. Thinking Mistakes That Create Stress, Anger, Depression, Anxiety, and Worry    All-or-nothing thinking. You see things in black-and-white categories. It is either one thing or another; there is no room for anything in between. Im 100% healthy or I must have a fatal disease.   Jumping to conclusions. You make a negative interpretation even though there are no definite facts that convincingly support your conclusion. My  is late because he is in a car accident and is injured on the side of the road.   Fortune-telling. You anticipate things will turn out badly, convinced the prediction is a fact. Not getting this job will cause us to lose the house.    Should statements. Musts and oughts are also offenders.  Emotional consequences can include anxiety and anger. Ronakamor Burrows should be able to handle this.    Overgeneralization. Assuming one event is actually a pattern. My hand is a little shaky today, I must have Parkinsons Disease.   Disqualifying the positive. Filtering out or rejecting positive experiences to maintain negative beliefs. Upon hearing that your spouse has checked all the doors and windows and they are all locked you think, But someone could cut out a piece of glass and open the window.   Catastrophizing. Predicting the worst possible outcome imaginable. Terrible, awful, horrible, worst ever might be key words. If I cant get my heart to stop pounding Im going to die.      Superstitious thinking. The thought that something you do prevents something awful from happening. Ruy Samples my spouse a hug and telling her to be careful before going to work will prevent her from getting in a wreck. I do it every morning and she hasnt gotten in a wreck yet.   Emotional reasoning. The belief that because you feel a certain way means that the assumptions and associations you have with that feeling are true. The fear, doom, and constant anxiety must mean something is seriously wrong with me.             Challenging Upsetting Thinking  ________________________________________________________________________  Examine your thoughts for key words:     1. must, need, got to, have to, should (unrealistic standards)   2. never, always, completely, totally, all everything, everyone (predictions / labeling)   3. awful, terrible, horrible, unbearable, disaster, worst ever (labeling / predictions)   4. jerk, slob, creep, hypocrite, bully, stupid (labels)    Dispute or question the accuracy of the questionable thoughts. 1. Am I upsetting myself unnecessarily? How can I see this another way? 2. Is my thinking working for or against me? How could I view this in a less    upsetting way? 3. What am I demanding must happen?   What do I want or prefer, rather than need?   4. Am I making something too terrible? Is it really that awful? What would be so     terrible about that? 5. Am I labeling a person? What is the action that I dont like? 6. Whats untrue about my thoughts? How can I stick to the facts? Whats the proof    for what I am thinking or believing about this? 7. Am I using extreme, black-and-white language? What less extreme words might     be more accurate? 8. Am I fortune telling or mind reading in a way that gets me upset or unhappy? What are the odds (percent chance -- e.g., there is a 5% chance. ..) that it will     really turn out the way Im thinking or imagining? 9. What are my options in this situation? How would I like to respond? 10. Create more moderate, helpful, or realistic statements to replace the upsetting    ones. 11.   Have I had any experiences that show that this thought might not be completely true? 12.   If my best friend or someone I loved had this thought, what would I tell them? 15. If my best friend or someone I loved knew I was thinking this thought, what would they say to me? What evidence would they point out to me that would suggest that my thought is not completely true? 14. Are there strengths in me or positives in the situation that I am ignoring? Am I underestimating my ability to cope with unfortunate circumstances? 15. When I am not feeling this way, do I think about this situation any differently? How?  16. Have I been in this type of situation before? What happened? What have I learned from prior experiences that could help me now? 17. Five years from now, if I look back on this situation, will I look at it any differently? Will I focus on any different part of my experience? 25. Am I blaming myself for something over which I do not have complete control?

## 2020-11-04 NOTE — PROGRESS NOTES
Jayne Martinez is a 36 y.o. female evaluated via telephone on 11/4/2020. Consent:  She and/or health care decision maker is aware that that she may receive a bill for this telephone service, depending on her insurance coverage, and has provided verbal consent to proceed: NA - Consent obtained within past 12 months      Documentation:  I communicated with the patient and/or health care decision maker about see therapy progress note. Details of this discussion including any medical advice provided: see therapy progress note. I affirm this is a Patient Initiated Episode with a Patient who has not had a related appointment within my department in the past 7 days or scheduled within the next 24 hours. Patient identification was verified at the start of the visit: Yes    Total Time: minutes: 21-30 minutes    Note: not billable if this call serves to triage the patient into an appointment for the relevant concern      Kris Melendez       Therapy Progress Note  Kris PETERS, LCSW  11/4/2020  8:00 AM  9:00 AM    Patient location  : home  [facility] location :[address] [place] [address]      Time spent with Patient: 60 minutes  This is patient's 11th  Therapy appointment. Reason for Consult:  depression and anxiety  Referring Provider: No referring provider defined for this encounter. Jayne Martinez ,a 36 y.o. female, for initial evaluation visit. Pt provided informed consent for the behavioral health program. Discussed with patient model of service to include the limits of confidentiality (i.e. abuse reporting, suicide intervention, etc.) and short-term intervention focused approach. Discussed no show and late cancellation policy. Pt indicated understanding. S:  Pt reported information on custody agreements. Pt decided to agree to what her ex requested for fear of loosing her kids due to smoking marijuana.  Discussed personal thought control, autogenic relaxation, how to question stressful, angry, anxious, or stopped Xanax 6/17/19.      Sexual activity: Not Currently     Comment: 9/4/20 no birth control, not sexually active   Lifestyle    Physical activity     Days per week: Not on file     Minutes per session: Not on file    Stress: Not on file   Relationships    Social connections     Talks on phone: Not on file     Gets together: Not on file     Attends Rastafari service: Not on file     Active member of club or organization: Not on file     Attends meetings of clubs or organizations: Not on file     Relationship status: Not on file    Intimate partner violence     Fear of current or ex partner: Not on file     Emotionally abused: Not on file     Physically abused: Not on file     Forced sexual activity: Not on file   Other Topics Concern    Not on file   Social History Narrative    PREVIOUS MEDICATION TRIALS    Ambien    Effexor XR (current, 150mg)    Prozac (several years, 40mg, doesn't remember why she stopped)    Elavil    Zoloft (several years, doesn't remember the dose, stopped taking because she was pregnant)    Xanax         No negative effect with taking SSRI's       Medications:   Current Outpatient Medications   Medication Sig Dispense Refill    venlafaxine (EFFEXOR XR) 150 MG extended release capsule Take 1 capsule by mouth daily 90 capsule 1    cloNIDine (CATAPRES) 0.1 MG tablet Take 1 tablet by mouth nightly 90 tablet 1    busPIRone (BUSPAR) 30 MG tablet Take 30 mg by mouth 2 times daily 180 tablet 1    amitriptyline (ELAVIL) 25 MG tablet Take 1 tablet by mouth nightly 90 tablet 1    venlafaxine (EFFEXOR XR) 75 MG extended release capsule Take 1 capsule by mouth daily (with 150mg to make a 225mg dose) 60 capsule 1    adalimumab (HUMIRA) 40 MG/0.8ML injection Inject 40 mg into the skin every 14 days Indications: Psoriasis      metoprolol tartrate (LOPRESSOR) 25 MG tablet 25 mg nightly   11    atorvastatin (LIPITOR) 80 MG tablet Take by mouth nightly   11    BRILINTA 90 MG TABS tablet Take 90 mg by mouth nightly   11    ondansetron (ZOFRAN-ODT) 4 MG disintegrating tablet Take 4 mg by mouth every 6 hours as needed for Nausea or Vomiting      aspirin 81 MG tablet Take 81 mg by mouth daily       No current facility-administered medications for this visit. Social History:   Social History     Socioeconomic History    Marital status:      Spouse name: Not on file    Number of children: 2    Years of education: assoc degree    Highest education level: Not on file   Occupational History    Not on file   Social Needs    Financial resource strain: Not hard at all   [employer] D-Michelle insecurity     Worry: Never true     Inability: Never true   [employer] needs     Medical: No     Non-medical: No   Tobacco Use    Smoking status: Current Every Day Smoker     Packs/day: 1.00     Start date: 56    Smokeless tobacco: Never Used    Tobacco comment: 6/5/19 pt given in on smoking and smoking cessation. Substance and Sexual Activity    Alcohol use: Yes     Alcohol/week: 2.0 standard drinks     Types: 1 Glasses of wine, 1 Shots of liquor per week     Frequency: Monthly or less     Drinks per session: 1 or 2     Comment: \"I used to drink a lot. I drink 2 to 3 drinks a month now\".  Drug use: Yes     Types: Marijuana     Comment: uses 9/4/20 marijuana every day, stopped Xanax 6/17/19.      Sexual activity: Not Currently     Comment: 9/4/20 no birth control, not sexually active   Lifestyle    Physical activity     Days per week: Not on file     Minutes per session: Not on file    Stress: Not on file   Relationships    Social connections     Talks on phone: Not on file     Gets together: Not on file     Attends Shinto service: Not on file     Active member of club or organization: Not on file     Attends meetings of clubs or organizations: Not on file     Relationship status: Not on file    Intimate partner violence     Fear of current or ex partner: Not on file     Emotionally abused: Not on and Identified maladaptive thoughts      Marcel Worrell MSW, LCSW

## 2020-11-23 ENCOUNTER — TELEPHONE (OUTPATIENT)
Dept: PSYCHIATRY | Age: 40
End: 2020-11-23

## 2020-11-23 RX ORDER — VENLAFAXINE HYDROCHLORIDE 150 MG/1
150 CAPSULE, EXTENDED RELEASE ORAL DAILY
Qty: 90 CAPSULE | Refills: 1
Start: 2020-11-23 | End: 2021-03-16

## 2020-11-23 RX ORDER — VENLAFAXINE HYDROCHLORIDE 75 MG/1
CAPSULE, EXTENDED RELEASE ORAL
Qty: 90 CAPSULE | Refills: 0 | Status: SHIPPED | OUTPATIENT
Start: 2020-11-23 | End: 2021-03-16

## 2020-11-23 NOTE — TELEPHONE ENCOUNTER
Pharmacy sent med refill request below. Last office visit : 9/4/2020 with Graham DILLARD  Next office visit : 12/10/2020 with Graham DILLARD    Requested Prescriptions     Pending Prescriptions Disp Refills    venlafaxine (EFFEXOR XR) 75 MG extended release capsule [Pharmacy Med Name: Venlafaxine HCl ER 75 MG Oral Capsule Extended Release 24 Hour] 60 capsule 0     Sig: TAKE 1 CAPSULE BY MOUTH ONCE DAILY (WITH 150MG TO MAKE 225MG DOSE)            Marilyn Robles RN        9/4/2020 1:03 PM                               Progress Note     IN:  2558  OUT: 1200        Shyla Dowling 1980                                 Chief Complaint   Patient presents with    Follow-up    Anxiety    Depression            Subjective:  Patient is a 36 y.o. female diagnosed with MDD and presents today for follow-up. Last seen in clinic on 6/4/20 and prior records were reviewed.     Today patient states, \"I'm doing pretty good, a little up and down, overall better. \" She reports that she has gotten things done around the house and feels good about this. She still reports going back and forth with her ex-. She reports anxiety around this. She has some anxiety around her ex-'s new fiance because the woman has done sex videos and there was a man sitting at her ex-'s house while her child was there.     Patient reports side effects as follows: none. No evidence of EPS, no cogwheeling or abnormal motor movements.     Absent  suicidal ideation.   Reports compliance with medications as good .      Current Substance Use:  See history     BP: There were no vitals taken for this visit.        Review of Systems - 14 point review:  Negative except being treated for: psoriasis, hx of MI, HTN, anxiety, depression, insomnia, cannabis dependence        Constitutional: (fevers, chills, night sweats, wt loss/gain, change in appetite, fatigue, somnolence)     HEENT: (ear pain or discharge, hearing loss, ear ringing, sinus pressure, nosebleed, nasal discharge, sore throat, oral sores, tooth pain, bleeding gums, hoarse voice, neck pain)      Cardiovascular: (HTN, chest pain, elevated cholesterol/lipids, palpitations, leg swelling, leg pain with walking)     Respiratory: (cough, wheezing, snoring, SOB with activity (dyspnea), SOB while lying flat (orthopnea), awakening with severe SOB (paroxysmal nocturnal dyspnea))     Gastrointestinal: (NVD, constipation, abdominal pain, bright red stools, black tarry stools, stool incontinence)     Genitourinary:  (pelvic pain, burning or frequency of urination, urinary urgency, blood in urine incomplete bladder emptying, urinary incontinence, STD; MEN: testicular pain or swelling, erectile dysfunction; WOMEN: LMP, heavy menstrual bleeding (menorrhagia), irregular periods, postmenopausal bleeding, menstrual pain (dymenorrhea, vaginal discharge)     Musculoskeletal: (bone pain/fracture, joint pain or swelling, musle pain)     Integumentary: (rashes, acne, non-healing sores, itching, breast lumps, breast pain, nipple discharge, hair loss)     Neurologic: (HA, muscle weakness, paresthesias (numbness, coldness, crawling or prickling), memory loss, seizure, dizziness)     Psychiatric:  (anxiety, sadness, irritability/anger, insomnia, suicidality)     Endocrine: (heat or cold intolerance, excessive thirst (polydipsia), excessive hunger (polyphagia))     Immune/Allergic: (hives, seasonal or environmental allergies, HIV exposure)     Hematologic/Lymphatic: (lymph node enlargement, easy bleeding or bruising)     History obtained via chart review and patient     PCP is Dudley Prasad DO         Current Meds:     Home Medications           Prior to Admission medications    Medication Sig Start Date End Date Taking?  Authorizing Provider   venlafaxine (EFFEXOR XR) 150 MG extended release capsule Take 1 capsule by mouth daily 9/4/20   Yes GILMA Lopez NP   cloNIDine (CATAPRES) 0.1 MG tablet Take 1 tablet by mouth nightly 9/4/20   Yes GILMA Neal NP   busPIRone (BUSPAR) 30 MG tablet Take 30 mg by mouth 2 times daily 9/4/20   Yes GILMA Neal NP   amitriptyline (ELAVIL) 25 MG tablet Take 1 tablet by mouth nightly 8/31/20     GILMA Neal NP   venlafaxine (EFFEXOR XR) 75 MG extended release capsule Take 1 capsule by mouth daily (with 150mg to make a 225mg dose) 7/31/20     GILMA Neal NP   adalimumab (HUMIRA) 40 MG/0.8ML injection Inject 40 mg into the skin every 14 days Indications: Psoriasis       Historical Provider, MD   metoprolol tartrate (LOPRESSOR) 25 MG tablet 25 mg nightly  4/1/19     Historical Provider, MD   atorvastatin (LIPITOR) 80 MG tablet Take by mouth nightly  3/28/19     Historical Provider, MD   BRILINTA 90 MG TABS tablet Take 90 mg by mouth nightly  4/1/19     Historical Provider, MD   ondansetron (ZOFRAN-ODT) 4 MG disintegrating tablet Take 4 mg by mouth every 6 hours as needed for Nausea or Vomiting       Historical Provider, MD   aspirin 81 MG tablet Take 81 mg by mouth daily       Historical Provider, MD        Social History   Social History            Socioeconomic History    Marital status: Single       Spouse name: None    Number of children: 2    Years of education: assoc degree    Highest education level: None   Occupational History    None   Social Needs    Financial resource strain: None    Food insecurity       Worry: None       Inability: None    Transportation needs       Medical: None       Non-medical: None   Tobacco Use    Smoking status: Current Every Day Smoker       Packs/day: 1.00       Start date: 56    Smokeless tobacco: Never Used    Tobacco comment: 6/5/19 pt given in on smoking and smoking cessation. Substance and Sexual Activity    Alcohol use:  Yes       Alcohol/week: 2.0 standard drinks       Types: 1 Glasses of wine, 1 Shots of liquor per week       Frequency: Monthly or less       Drinks per session: 1 or 2       Comment: \"I used to drink a lot. I drink 2 to 3 drinks a month now\".  Drug use: Yes       Types: Marijuana       Comment: uses 9/4/20 marijuana every day, stopped Xanax 6/17/19.  Sexual activity: Not Currently       Comment: 9/4/20 no birth control, not sexually active   Lifestyle    Physical activity       Days per week: None       Minutes per session: None    Stress: None   Relationships    Social connections       Talks on phone: None       Gets together: None       Attends Latter-day service: None       Active member of club or organization: None       Attends meetings of clubs or organizations: None       Relationship status: None    Intimate partner violence       Fear of current or ex partner: None       Emotionally abused: None       Physically abused: None       Forced sexual activity: None   Other Topics Concern    None   Social History Narrative     PREVIOUS MEDICATION TRIALS     Ambien     Effexor XR (current, 150mg)     Prozac (several years, 40mg, doesn't remember why she stopped)     Elavil     Zoloft (several years, doesn't remember the dose, stopped taking because she was pregnant)     Xanax           No negative effect with taking SSRI's           MSE:  Patient is  A & O x 4. Appearance:  JOY. Cognition:  Recent memory intact , remote memory intact , good fund of knowledge, average  intelligence level. Speech:  normal  Language: Naming: intact;  Word Finding: intact  Conversation no evidence of delusions  Behavior:  Cooperative  Mood: \"anxious\"   Affect: congruent with mood  Thought Content: negative delusions, negative hallucinations, negative obsessions,  negativehomicidal and negative suicidal   Thought Process: linear, goal directed and coherent  Associations: logical connections  Attention Span and concentration: Normal   Judgement Insight:  normal and appropriate  Gait and Station:JOY   Sleep: avg 8 hrs (is using an glenroy called Cyphoma)  Appetite: ok        No results found for:

## 2020-12-02 ENCOUNTER — VIRTUAL VISIT (OUTPATIENT)
Dept: PSYCHIATRY | Age: 40
End: 2020-12-02
Payer: COMMERCIAL

## 2020-12-02 PROCEDURE — 98968 PH1 ASSMT&MGMT NQHP 21-30: CPT | Performed by: SOCIAL WORKER

## 2020-12-02 NOTE — PROGRESS NOTES
situations. Pt reported she would like to continue the 2 weeks appointments and verbalized she would call sooner if needed. Pt denies Suicidal Ideations, Homicidal Ideation, Auditory Hallucinations, Visual Hallucinations, Tactical Hallucinations. MSE:    Sounded    alert, cooperative, moderate distress  Appetite normal  Sleep disturbance No  Fatigue Yes  Loss of pleasure Yes  Impulsive behavior Yes  Speech    loud and interrupting  Mood    Anxious    Thought Content    cognitive distortions and all or nothing thinking  Thought Process    circumstantial  Associations    logical connections  Insight    Poor  Judgment    Impaired  Orientation    oriented to person, place, time, and general circumstances  Memory    recent and remote memory intact  Attention/Concentration    intact  Morbid ideation No  Suicide Assessment    no suicidal ideation      History:  Social History     Socioeconomic History    Marital status:      Spouse name: Not on file    Number of children: 2    Years of education: assoc degree    Highest education level: Not on file   Occupational History    Not on file   Social Needs    Financial resource strain: Not hard at all   [employer] insecurity     Worry: Never true     Inability: Never true   [employer] needs     Medical: No     Non-medical: No   Tobacco Use    Smoking status: Current Every Day Smoker     Packs/day: 1.00     Start date: 56    Smokeless tobacco: Never Used    Tobacco comment: 6/5/19 pt given in on smoking and smoking cessation. Substance and Sexual Activity    Alcohol use: Yes     Alcohol/week: 2.0 standard drinks     Types: 1 Glasses of wine, 1 Shots of liquor per week     Frequency: Monthly or less     Drinks per session: 1 or 2     Comment: \"I used to drink a lot. I drink 2 to 3 drinks a month now\".  Drug use: Yes     Types: Marijuana     Comment: uses 9/4/20 marijuana every day, stopped Xanax 6/17/19.      Sexual activity: Not Currently Comment: 9/4/20 no birth control, not sexually active   Lifestyle    Physical activity     Days per week: Not on file     Minutes per session: Not on file    Stress: Not on file   Relationships    Social connections     Talks on phone: Not on file     Gets together: Not on file     Attends Worship service: Not on file     Active member of club or organization: Not on file     Attends meetings of clubs or organizations: Not on file     Relationship status: Not on file    Intimate partner violence     Fear of current or ex partner: Not on file     Emotionally abused: Not on file     Physically abused: Not on file     Forced sexual activity: Not on file   Other Topics Concern    Not on file   Social History Narrative    PREVIOUS MEDICATION TRIALS    Ambien    Effexor XR (current, 150mg)    Prozac (several years, 40mg, doesn't remember why she stopped)    Elavil    Zoloft (several years, doesn't remember the dose, stopped taking because she was pregnant)    Xanax         No negative effect with taking SSRI's       Medications:   Current Outpatient Medications   Medication Sig Dispense Refill    venlafaxine (EFFEXOR XR) 75 MG extended release capsule TAKE 1 CAPSULE BY MOUTH ONCE DAILY (WITH 150MG TO MAKE 225MG DOSE) 90 capsule 0    venlafaxine (EFFEXOR XR) 150 MG extended release capsule Take 1 capsule by mouth daily 90 capsule 1    cloNIDine (CATAPRES) 0.1 MG tablet Take 1 tablet by mouth nightly 90 tablet 1    busPIRone (BUSPAR) 30 MG tablet Take 30 mg by mouth 2 times daily 180 tablet 1    amitriptyline (ELAVIL) 25 MG tablet Take 1 tablet by mouth nightly 90 tablet 1    adalimumab (HUMIRA) 40 MG/0.8ML injection Inject 40 mg into the skin every 14 days Indications: Psoriasis      metoprolol tartrate (LOPRESSOR) 25 MG tablet 25 mg nightly   11    atorvastatin (LIPITOR) 80 MG tablet Take by mouth nightly   11    BRILINTA 90 MG TABS tablet Take 90 mg by mouth nightly   11    ondansetron (ZOFRAN-ODT) 4 MG disintegrating tablet Take 4 mg by mouth every 6 hours as needed for Nausea or Vomiting      aspirin 81 MG tablet Take 81 mg by mouth daily       No current facility-administered medications for this visit. Social History:   Social History     Socioeconomic History    Marital status:      Spouse name: Not on file    Number of children: 2    Years of education: assoc degree    Highest education level: Not on file   Occupational History    Not on file   Social Needs    Financial resource strain: Not hard at all   Krishna-Michelle insecurity     Worry: Never true     Inability: Never true   [employer] needs     Medical: No     Non-medical: No   Tobacco Use    Smoking status: Current Every Day Smoker     Packs/day: 1.00     Start date: 56    Smokeless tobacco: Never Used    Tobacco comment: 6/5/19 pt given in on smoking and smoking cessation. Substance and Sexual Activity    Alcohol use: Yes     Alcohol/week: 2.0 standard drinks     Types: 1 Glasses of wine, 1 Shots of liquor per week     Frequency: Monthly or less     Drinks per session: 1 or 2     Comment: \"I used to drink a lot. I drink 2 to 3 drinks a month now\".  Drug use: Yes     Types: Marijuana     Comment: uses 9/4/20 marijuana every day, stopped Xanax 6/17/19.      Sexual activity: Not Currently     Comment: 9/4/20 no birth control, not sexually active   Lifestyle    Physical activity     Days per week: Not on file     Minutes per session: Not on file    Stress: Not on file   Relationships    Social connections     Talks on phone: Not on file     Gets together: Not on file     Attends Mormonism service: Not on file     Active member of club or organization: Not on file     Attends meetings of clubs or organizations: Not on file     Relationship status: Not on file    Intimate partner violence     Fear of current or ex partner: Not on file     Emotionally abused: Not on file     Physically abused: Not on file     Forced sexual activity: Not on file   Other Topics Concern    Not on file   Social History Narrative    PREVIOUS MEDICATION TRIALS    Ambien    Effexor XR (current, 150mg)    Prozac (several years, 40mg, doesn't remember why she stopped)    Elavil    Zoloft (several years, doesn't remember the dose, stopped taking because she was pregnant)    Xanax         No negative effect with taking SSRI's       TOBACCO:   reports that she has been smoking. She started smoking about 24 years ago. She has been smoking about 1.00 pack per day. She has never used smokeless tobacco.  ETOH:   reports current alcohol use of about 2.0 standard drinks of alcohol per week. Family History:   Family History   Problem Relation Age of Onset    High Blood Pressure Mother     Depression Mother     Anxiety Disorder Mother     Heart Attack Father     Heart Disease Father     High Cholesterol Father        Diagnosis:    Major depressive disorder; recurrent and mod      Diagnosis Date    CAD (coronary artery disease)     MI about 1 1/2 yrs ago    CHF (congestive heart failure) ([facility] [address]     Hyperlipidemia     Hypertension     kidney stones     VINOD (obstructive sleep apnea)     uses CPAP    Psoriasis     Psychiatric problem     hx of depression and anxiety since a teenager. Problems with primary support group, Problems related to the social environment and Other psychosocial and environmental problems    Plan:  1. Continue medication management  2. CBT to target cognitive distortions  3. Discuss therapeutic goals  A.  Setting boundaries   B. Assertive Communications    Pt interventions:    Trained in strategies for increasing balanced thinking, Provided education, Discussed self-care (sleep, nutrition, rewarding activities, social support, exercise), [place]-setting to identify pt's primary goals for [facility] visit / overall health, Supportive techniques, Emphasized self-care as important for managing overall health and Identified maladaptive thoughts    Oliver Lerner MSW, LCSW

## 2020-12-10 ENCOUNTER — VIRTUAL VISIT (OUTPATIENT)
Dept: PSYCHIATRY | Age: 40
End: 2020-12-10
Payer: COMMERCIAL

## 2020-12-10 PROCEDURE — 99443 PR PHYS/QHP TELEPHONE EVALUATION 21-30 MIN: CPT | Performed by: NURSE PRACTITIONER

## 2020-12-10 ASSESSMENT — PATIENT HEALTH QUESTIONNAIRE - PHQ9
SUM OF ALL RESPONSES TO PHQ QUESTIONS 1-9: 12
6. FEELING BAD ABOUT YOURSELF - OR THAT YOU ARE A FAILURE OR HAVE LET YOURSELF OR YOUR FAMILY DOWN: 1
1. LITTLE INTEREST OR PLEASURE IN DOING THINGS: 3
7. TROUBLE CONCENTRATING ON THINGS, SUCH AS READING THE NEWSPAPER OR WATCHING TELEVISION: 1
8. MOVING OR SPEAKING SO SLOWLY THAT OTHER PEOPLE COULD HAVE NOTICED. OR THE OPPOSITE, BEING SO FIGETY OR RESTLESS THAT YOU HAVE BEEN MOVING AROUND A LOT MORE THAN USUAL: 0
10. IF YOU CHECKED OFF ANY PROBLEMS, HOW DIFFICULT HAVE THESE PROBLEMS MADE IT FOR YOU TO DO YOUR WORK, TAKE CARE OF THINGS AT HOME, OR GET ALONG WITH OTHER PEOPLE: 0
SUM OF ALL RESPONSES TO PHQ QUESTIONS 1-9: 12
3. TROUBLE FALLING OR STAYING ASLEEP: 0
9. THOUGHTS THAT YOU WOULD BE BETTER OFF DEAD, OR OF HURTING YOURSELF: 0
2. FEELING DOWN, DEPRESSED OR HOPELESS: 2
4. FEELING TIRED OR HAVING LITTLE ENERGY: 3
SUM OF ALL RESPONSES TO PHQ QUESTIONS 1-9: 12
5. POOR APPETITE OR OVEREATING: 2
SUM OF ALL RESPONSES TO PHQ9 QUESTIONS 1 & 2: 5

## 2020-12-10 ASSESSMENT — COLUMBIA-SUICIDE SEVERITY RATING SCALE - C-SSRS
1. WITHIN THE PAST MONTH, HAVE YOU WISHED YOU WERE DEAD OR WISHED YOU COULD GO TO SLEEP AND NOT WAKE UP?: NO
2. HAVE YOU ACTUALLY HAD ANY THOUGHTS OF KILLING YOURSELF?: NO
6. HAVE YOU EVER DONE ANYTHING, STARTED TO DO ANYTHING, OR PREPARED TO DO ANYTHING TO END YOUR LIFE?: NO

## 2020-12-10 NOTE — PROGRESS NOTES
Hardy Dover is a 36 y.o. female evaluated via telephone on 12/10/2020. Consent:  She and/or health care decision maker is aware that that she may receive a bill for this telephone service, depending on her insurance coverage, and has provided verbal consent to proceed: Yes      Documentation:  I communicated with the patient and/or health care decision maker about anxiety/depression. Details of this discussion including any medical advice provided: see below      I affirm this is a Patient Initiated Episode with a Patient who has not had a related appointment within my department in the past 7 days or scheduled within the next 24 hours. Patient identification was verified at the start of the visit: Yes    Total Time: minutes: 21-30 minutes    Note: not billable if this call serves to triage the patient into an appointment for the relevant concern      Sugey Evans       12/10/2020 2:31 PM   Progress Note    IN:  [address]  OUT: [address] [place] [address] [address]      Chief Complaint   Patient presents with    Follow-up    Anxiety    Depression         Subjective:  Patient is a 36 y.o. female diagnosed with MDD and presents today for follow-up. Last seen in clinic on 9/4/20 and prior records were reviewed. Today patient states, \"My medicine has been good I think. \" She has no concerns with medication and does not think anything needs to change. She reports that her  has had her children for the last 20 days because of quarantine restrictions. This was stressful for her. Patient reports side effects as follows: none. No evidence of EPS, no cogwheeling or abnormal motor movements. Absent  suicidal ideation. Reports compliance with medications as good . Current Substance Use:  See history    BP: There were no vitals taken for this visit.       Review of Systems - 14 point review:  Negative except being treated for: psoriasis, hx of MI, HTN, anxiety, depression, insomnia, cannabis dependence      Constitutional: (fevers, chills, night sweats, wt loss/gain, change in appetite, fatigue, somnolence)    HEENT: (ear pain or discharge, hearing loss, ear ringing, sinus pressure, nosebleed, nasal discharge, sore throat, oral sores, tooth pain, bleeding gums, hoarse voice, neck pain)      Cardiovascular: (HTN, chest pain, elevated cholesterol/lipids, palpitations, leg swelling, leg pain with walking)    Respiratory: (cough, wheezing, snoring, SOB with activity (dyspnea), SOB while lying flat (orthopnea), awakening with severe SOB (paroxysmal nocturnal dyspnea))    Gastrointestinal: (NVD, constipation, abdominal pain, bright red stools, black tarry stools, stool incontinence)     Genitourinary:  (pelvic pain, burning or frequency of urination, urinary urgency, blood in urine incomplete bladder emptying, urinary incontinence, STD; MEN: testicular pain or swelling, erectile dysfunction; WOMEN: LMP, heavy menstrual bleeding (menorrhagia), irregular periods, postmenopausal bleeding, menstrual pain (dymenorrhea, vaginal discharge)    Musculoskeletal: (bone pain/fracture, joint pain or swelling, musle pain)    Integumentary: (rashes, acne, non-healing sores, itching, breast lumps, breast pain, nipple discharge, hair loss)    Neurologic: (HA, muscle weakness, paresthesias (numbness, coldness, crawling or prickling), memory loss, seizure, dizziness)    Psychiatric:  (anxiety, sadness, irritability/anger, insomnia, suicidality)    Endocrine: (heat or cold intolerance, excessive thirst (polydipsia), excessive hunger (polyphagia))    Immune/Allergic: (hives, seasonal or environmental allergies, HIV exposure)    Hematologic/Lymphatic: (lymph node enlargement, easy bleeding or bruising)    History obtained via chart review and patient    PCP is Preet Longoria, DO       Current Meds:    Prior to Admission medications    Medication Sig Start Date End Date Taking?  Authorizing Provider   venlafaxine (EFFEXOR XR) 75 MG extended release capsule TAKE 1 CAPSULE BY MOUTH ONCE DAILY (WITH 150MG TO MAKE 225MG DOSE) 11/23/20   Liisa Scheuermann, APRN - NP   venlafaxine (EFFEXOR XR) 150 MG extended release capsule Take 1 capsule by mouth daily 11/23/20   Liisa Scheuermann, APRN - NP   cloNIDine (CATAPRES) 0.1 MG tablet Take 1 tablet by mouth nightly 9/4/20   Liisa Scheuermann, APRN - NP   busPIRone (BUSPAR) 30 MG tablet Take 30 mg by mouth 2 times daily 9/4/20   Liisa Scheuermann, APRN - NP   amitriptyline (ELAVIL) 25 MG tablet Take 1 tablet by mouth nightly 8/31/20   Liisa Scheuermann, APRN - NP   adalimumab (HUMIRA) 40 MG/0.8ML injection Inject 40 mg into the skin every 14 days Indications: Psoriasis    Historical Provider, MD   metoprolol tartrate (LOPRESSOR) 25 MG tablet 25 mg nightly  4/1/19   Historical Provider, MD   atorvastatin (LIPITOR) 80 MG tablet Take by mouth nightly  3/28/19   Historical Provider, MD   BRILINTA 90 MG TABS tablet Take 90 mg by mouth nightly  4/1/19   Historical Provider, MD   ondansetron (ZOFRAN-ODT) 4 MG disintegrating tablet Take 4 mg by mouth every 6 hours as needed for Nausea or Vomiting    Historical Provider, MD   aspirin 81 MG tablet Take 81 mg by mouth daily    Historical Provider, MD     Social History     Socioeconomic History    Marital status:      Spouse name: None    Number of children: 2    Years of education: assoc degree    Highest education level: None   Occupational History    None   Social Needs    Financial resource strain: Not hard at all   [employer] insecurity     Worry: Never true     Inability: Never true    Transportation needs     Medical: No     Non-medical: No   Tobacco Use    Smoking status: Current Every Day Smoker     Packs/day: 1.00     Start date: 56    Smokeless tobacco: Never Used    Tobacco comment: 6/5/19 pt given in on smoking and smoking cessation. Substance and Sexual Activity    Alcohol use:  Yes     Alcohol/week: 2.0 standard drinks     Types: 1 Glasses of wine, 1 Shots of liquor per week     Frequency: Monthly or less     Drinks per session: 1 or 2     Comment: \"I used to drink a lot. I drink 2 to 3 drinks a month now\".  Drug use: Yes     Types: Marijuana     Comment: uses 9/4/20 marijuana every day     Sexual activity: Not Currently     Comment: 9/4/20 no birth control, not sexually active   Lifestyle    Physical activity     Days per week: None     Minutes per session: None    Stress: None   Relationships    Social connections     Talks on phone: None     Gets together: None     Attends Anglican service: None     Active member of club or organization: None     Attends meetings of clubs or organizations: None     Relationship status: None    Intimate partner violence     Fear of current or ex partner: None     Emotionally abused: None     Physically abused: None     Forced sexual activity: None   Other Topics Concern    None   Social History Narrative    PREVIOUS MEDICATION TRIALS    Ambien    Effexor XR (current, 150mg)    Prozac (several years, 40mg, doesn't remember why she stopped)    Elavil    Zoloft (several years, doesn't remember the dose, stopped taking because she was pregnant)    Xanax         No negative effect with taking SSRI's       MSE:  Patient is  A & O x 4. Appearance:  JOY. Cognition:  Recent memory intact , remote memory intact , good fund of knowledge, average  intelligence level. Speech:  normal  Language: Naming: intact;  Word Finding: intact  Conversation no evidence of delusions  Behavior:  Cooperative  Mood: \"excited\" (her kids are coming home after 20 days with her ex-)   Affect: congruent with mood  Thought Content: negative delusions, negative hallucinations, negative obsessions,  negativehomicidal and negative suicidal   Thought Process: linear, goal directed and coherent  Associations: logical connections  Attention Span and concentration: Normal   Judgement Insight:  normal and appropriate  Gait and Station:JOY   Sleep: encounter. 9. Additional comments: She continues to have custody issues with her  and her 's fiance. This is stressful. She continues to be in therapy with Rosario Perdomo. She continues to benefit from this. Custody arrangements were just finalized on 11/4/20. Her scores are up a little from last visit, probably situational. She denies being suicidal. Will make no medication changes today and will follow up in about 4 months. She will call if she needs to be seen sooner. 10.Over 50% of the total visit time of 25  minutes was spent on counseling and/or coordination of care of:                        1. Moderate episode of recurrent major depressive disorder ([facility] [address]    2. CANDE (generalized anxiety disorder)    3. Insomnia due to other mental disorder    4.  Poor concentration                      Psychotherapy Topics: mood/medication effectiveness       Louann Gordon PMHNP-BC

## 2020-12-10 NOTE — PATIENT INSTRUCTIONS
Plan:  Continue:  Effexor XR, 225mg, daily  Buspirone, 30mg, twice daily  Amitriptyline, 25mg, nightly  Clonidine, 0.1mg, nightly (for focus, and anxiety)  Melatonin, 3mg up to 10mg, nightly as needed for sleep initiation (hasn't used it for the last couple of weeks and hasn't needed it)    Follow up: Return in about 4 months (around 4/10/2021). If you become suicidal, follow up with this office or call the [facility] at [phone]), or dial [phone].

## 2020-12-11 ENCOUNTER — TELEPHONE (OUTPATIENT)
Dept: PSYCHIATRY | Age: 40
End: 2020-12-11

## 2020-12-18 ENCOUNTER — VIRTUAL VISIT (OUTPATIENT)
Dept: PSYCHIATRY | Age: 40
End: 2020-12-18
Payer: COMMERCIAL

## 2020-12-18 ENCOUNTER — TELEPHONE (OUTPATIENT)
Dept: PSYCHIATRY | Age: 40
End: 2020-12-18

## 2020-12-18 PROCEDURE — 98968 PH1 ASSMT&MGMT NQHP 21-30: CPT | Performed by: SOCIAL WORKER
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
## 2020-12-18 NOTE — PATIENT INSTRUCTIONS
The effects are the same as the Dismissing Parent, children learn that their feelings are wrong, inappropriate or invalid. They may learn that there is something inherently wrong with them because of the way they feel. A child cannot understand why the emotions that he is feeling are not recognized by the parent as valid. A child may have difficulty regulating her own emotions and the emotional connection with the parent is lost in favor of behavior control. Children may also be confused by the mixed messages of punishment and/or praise. THE PERMISSIVE PARENT       CHARACTERISTICS    freely accepts all emotional expressions from the child   offers comfort to the child experiencing negative emotions   offers little guidance on behavior   does not teach the child about emotions   does not set limits; is permissive or inconsistent with limits   does not help the child solve problems   does not teach problem-solving methods to the child   believes there is little you can do about negative emotions other than ride them out   believes that managing negative emotions is a matter of hydraulics, release the emotion and the work is done    EFFECTS    Children do not learn to regulate their emotions, they can have trouble concentrating, forming (or keeping) friendships, getting along with other children and respecting others. The pressure to \"parent according to other's desires\" can lead to an inauthentic connection and a laissez-faire approach to parenting.          THE CONSCIOUS PARENT       CHARACTERISTICS    values the child's negative emotions as an opportunity for intimacy   can tolerate spending time with a sad, angry or fearful child; does not become impatient with the emotion   is aware of and values his or her own emotions   sees the world of negative emotions as an important arena for parenting   is sensitive to the child's emotional states, even when they are subtle - utilize [employer], and phone calls to keep in touch with family and friends as often as possible. Support from family and friends is one of our best \"buffers\" during stressful times. - keep an eye on bed times and wake times. If your child wakes up at 5 AM to catch the bus, feel free to let them sleep in a bit. But pre-teens and teens are prone to get their days and nights mixed up, which will make transitioning back to school challenging. - GO OUTSIDE! GO OUTSIDE! GO OUTSIDE! Hiking, scavenger hunts, walking the dog, pretend [employer], gardening: do it all! Bad weather? Utilize [employer] for exercise options - [employer] is a great option!    - is your child anxious about the situation? Creating feelings of SAFETY and CONTROL are the best things for kids (and adults!) in unsure and scary times. Teach them to wash their hands and explain to them how powerful that simple act is. Let them make choices as much as possible (e.g., do you want turkey or peanut butter for lunch? Do you want to start with science or reading work?). Organize a closet. Create something. Bake something new. - online schooling and homeschooling can be trickier than they seem. Many children and teens do not have the self-discipline to do this independently. It is simply too easy to continue putting off the work or [employer]" about assignments when you do not have teachers talking to you all day. For many children and teens, parents will NEED to stay involved in their schoolwork and provide structure (e.g., no screen time until you complete these 5 worksheets/study for 20 minutes/can recite all ten [place] vocab words). - if others (e.g., neighbor, , grandparent) is providing some childcare, make sure expectations (e.g., you must go outside for an hour/you must do two hours of schoolwork) and consequences (e.g., you get a time out after three warnings, no screen time if you use a curse word) should be consistent across caregivers. - schedule screen time in advance with clear expectations as to how it will balance with other things the child needs to do during the day. Give warnings/reminders when it is getting close to the time to turn off screens. Reward your child for turning off screens appropriately at the agreed upon time. - TAKE CARE OF YOURSELF! You are one of the most important people in your child's life; we need you healthy and well. Take breaks, schedule \"quiet time\" for the kids so you have time to recharge. Reach out to friends, exercise, and remind yourself that everyone is feeling challenged by this situation. Turn your thoughts into a positive  who is cheering you on: \"You can do this! You've done difficult things before and you can do it again! Your child is mary to have you! \"

## 2021-01-20 ENCOUNTER — VIRTUAL VISIT (OUTPATIENT)
Dept: PSYCHIATRY | Age: 41
End: 2021-01-20
Payer: COMMERCIAL

## 2021-01-20 DIAGNOSIS — F33.0 MILD EPISODE OF RECURRENT MAJOR DEPRESSIVE DISORDER (HCC): Primary | ICD-10-CM

## 2021-01-20 PROCEDURE — 98968 PH1 ASSMT&MGMT NQHP 21-30: CPT | Performed by: SOCIAL WORKER
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
## 2021-01-20 NOTE — PATIENT INSTRUCTIONS
Why the 20/10 method can change the way you clean your house  Your house will never stay clean with those marathon tidying sessions. Try this instead. Jan. 25, 2018, 10:26 AM CST / Updated Jan. 27, 2018, 10:23 AM CST   By Pieter holguiny, things pile up until you can't stand it or all of a sudden you have company coming over [place] and you have to do something about it right now. Then you go into a mad dash to clean everything. And you hate every minute of it. Does this approach to cleaning house sound familiar? You're not alone. Most people want to clean as infrequently as possible, so that translates to 'I'm going to do everything all at once,' says Hardeep Chowadry of Unf*ck Your Habitat: You're Better Than Your Mess. The thing is, marathon cleaning is the worst possible approach. But why? Once you're done with that marathon, sure your house is clean, but you're exhausted and probably frustrated, Derek Harris tells [employer] What's worse, you are now associating having a clean home with all of the stress that comes with marathon cleaning. [While] it might be a good time to say 'keep up with it now,' in the back of your mind you say 'that was terrible,' so you have all these negative associations with cleaning.  And these sentiments likely echo earlier associations, she says. How many of us when we're kids, you have this trashed room and cleaning it is a punishment?   No wonder the cycle continues to repeat itself. Our Notions of Housekeeping May Be Unrealistic    Unfortunately, Derek Harris says, conventional housekeeping systems are often written by and for naturally tidy people and people who don't necessarily know how to reach someone who isn't, she says. What's more, they often make assumptions about the people using them. Duke Ludwig own their house and live there with a nuclear family. [The woman] might stay at home  there's often a very traditional aspect to it, she says. While Bonnie Chance doesn't want to exclude anyone who matches that profile, Claudia Fischer are so many people who don't fit into many of those boxes, she says, Errol Harvey you're reading something that says 'Tuesday you do xyz,' and you're like, 'I'm at work till 6 and when I get home I'm exhausted.' There are a lot of ways people are living their lives. For me 'habitat' is where you are, whether that's a shared room or a dorm or a whole house to yourself. That's your space and you deserve to love it. My goal is that every one of these people see there is a way to conquer your mess.   And the way to do that, she says, comes down to a few key concepts. The 20/10 rule    Start breaking yourself of the marathon habit, Bonnie Chance says, by training yourself to do short bursts of cleaning followed by a break. Set a timer. When it goes off, you stop and do something else. Sit down with cup of tea or take the dog for a walk or mess around on the internet. If necessary you get right back to cleaning [when the break is over].  That could be 20 minutes and 10 minutes, or five and 45, whatever it takes. Ignore the big picture    The whole house is a mess? Forget about it. What can you do right now? I try to train people to refocus on the small things and how much of a difference they make when you're doing them regularly and over time, Bonnie Chance says. A lot of people look at the big picture and get overwhelmed. It's a matter of shifting focus from 'this entire place is a disaster' to 'those are dishes, I can deal with those.' It's a concrete, small thing you can deal with. This is a way for people to say 'I can. '    Make your bed    And those small things can start with just making your bed. Poonam's a huge fan of the childhood chore. The reason that's a good idea is it's a very small time investment, she says, but it immediately makes your bedroom look  and neater. Even if you've got the floordrobe (aka a heap of clothes on the floor), if you've got a made bed it pulls things together a little bit. I know I'm calmer if I'm looking at a made bed rather than a messy one.   Better still, it's easily repeatable so as you build that habit it becomes easier to build on and get new habits.     Put it away, not down    There's nothing new about this one, but for good reason. One of those new habits you build can be putting things away  straightaway. How much of our mess can be just averted entirely by putting it away and not down? Jerman Castorena says. It's another small time investment, just a couple minutes, she says, that can reap big rewards. Do you know where your laundry is? When you wash things there are three steps, Katie Spears says. Her rallying cry, often seen in her social media challenges is wash, dry, and put it away. Everyone forgets to put it away, she says, so we end up with the [facility].  If you've done a load of laundry or washed the dishes, it's not done until they're put away, she says. It's all part of training yourself to create a habit of putting things away before they can make a mess.     Bribe as needed    Still can't quite get going? How about a little carrot on the stick? I'm 100 percent in favor of bribing yourself, Katie Spears says. It's absolutely the most effective way of getting yourself to do something.  Whether that's a cup of special coffee or the next episode of whatever podcast or Netflix series you're bingeing, it's a thing you love that you can enjoy without guilt if you'll just do this kind of unpleasant thing.     Step away from the phone  or at least swap [id number] for this tumblr The average American spends one hour a day cleaning their house, according to the [employer]. Between work, errands, parenthood and the chaos of everyday life, learning how to keep a house clean can be one of the toughest lessons we learn. While it sometimes seems impossible to manage everything on your plate, there are some quick and easy tips to help you get your house (and your life) in order. Read on for everything you need to know about how to keep a house clean. How to Keep a House Clean Tip #1: Put everything away after use. This might seem like an obvious tip, but failing to put away your belongings is the main culprit of untidiness. As you move from one room to another, do a quick scan to see if theres anything that you can take with you. Go out of your way to make sure that anything you wear, use or move ends up where it belongs. Its easy to leave a pair of shoes near the front door, a few shirts on your bed and some dishes in the sink until tomorrow. Remember, those tiny piles can quickly turn into big messes. If youre lacking space, consider some DIY closet organization ideas like tension rods and shower curtain hooks. Use over-the-door organizers to extend existing spaces in your bedrooms, bathrooms and garcia closets. Padilla your smaller belongings with decorative baskets and organize your paper items with a . And remember: it takes much less time to put away your belongings each time than to frantically run around looking for them when you need them again! How to Keep a House Clean Tip #2: Do one room at a time. The easiest way to keep your entire house clean is to tackle one room at a time. By splitting up the cleaning by room (and by day), you can accomplish a little bit at a time instead of tackling every task at once.   Heres how to keep a house clean by focusing on one room at a time:  Bathroom From toothpaste dribbles and mirror stains to wet floors and shower grime, the bathroom will endure a variety of messes throughout the week. Because we constantly use it each day, its easiest to clean up after yourself instead of waiting a few weeks later for a bigger mess. Areas to focus on for a clean bathroom:  ? Clean the sink   ? Scrub the shower, tub and toilet   ? Remove mirror spots   ? Mop the floors (dont forget those corners)  Looking for a natural, easy-to-make  for your home? DIY  are a cost-effective way to keep your house clean. Try this simple DIY  recipe:  ? 2 cups water   ? ¼ cup baking soda   ? 2 tsp. dishwashing liquid   ? 3 tbsp. white vinegar   ? 10 drops essential oil  After you use the sink or step out of the shower, take your natural  and spray down the surface. Run the water again to wash all the  off. Voilàyouve prolonged your time until you have to do a deep clean in your shower! The essential oil will also keep the room smelling fresh for several days. For best results, ask everyone in your household to make this a habit. Bedroom  The key to keeping bedrooms clean is all about storage. If you have proper places for all of your clothes and accessories, youre more likely to keep your personal space clean. If youve noticed that you dont seem to have enough space, invest in storage containers or baskets that can fit under your bed. Having a complete change of linens under your bed also makes it easier to change them while your other set is being washed. Picking the right nightstand helps to keep your room more organized as well. Store Jayant [place], books and magazines in the drawers. If its large enough, it can double as a work desk. To keep your bedroom clean, make a habit of these steps:  ? Make your bed   ? Fold blankets and throws   ? Put away clutter in appropriate locations   ?  Dust desks, nightstands and shelves ? Vacuum floor and area rugs  Kitchen  Dishes tend to be the culprit in a messy kitchen. Have family members who like to let dishes soak?  Simply prepare one side of your sink with water and few drops of dish detergent. Throughout the day, add dishes to the soapy side and it will lift most of the grease and food off of the dishes. By the time you wash the dishes or put them in the , theyll be clean. The kitchen is a great place to apply the 20-minutes-per-day rule. Spend a few extra minutes cleaning your kitchen after each meal, and youll never have to deal with a huge mess in one of the most important rooms in your home. Focus on these areas when it comes to cleaning your kitchen:  ? Put away dishesalways have an empty sink! ? Clean countertops   ? Organize your pantry and refrigerator   ? Sweep and mop the floor   ? Use steel  for appliances  Living Room  The living room receives some of the most traffic in the house. If youre not careful, it can easily become littered with an assortment of everyday items. Make sure you place your items where they belong so your living room can leave a great first impression on your family and friends. Simple steps to regularly take for a clean living room:  ? Clear the room of any clutter (toys, games, books)   ? Fluff pillows and fold blankets/throws   ? Dust mantel, coffee tables and end tables   ? Vacuum floors and couches (especially if you have pets!)  Storage solutions like these will keep this area of your house clean:  Shoe rack. If your living room is carpeted, the constant foot traffic can wear your carpet down significantly. Make sure you have a place for your family and guests to store their shoes before they tromp dirt and grass through your clean home. Hidden storage. If you dont have a home for items like toys, books and games, vertical shelving can help display your things in an organized way. Storage ottomans are also perfect for minimizing clutter and storing things out of sight. How to Keep a [employer] Tip #3: Stick to a schedule. Its one thing to give each room a thorough clean, but how do you make sure your house stays clutter-free every day of the week? Believe it or not, easily: a cleaning schedule. Create a list to keep on your fridge, your wall or your desk to help keep you and your household organized. Your schedule should include not just what needs to be done, but when it needs to be done. By splitting your small household tasks into daily, weekly and even monthly routines, you can keep your house in perfect shape year round. How to Keep a [employer] Daily  Most people wait until their house is messy to start cleaning. The trick is to put in a small effort every day to keep your house as tidy as possible. These daily tasks are small but effective reminders of how to keep a house clean. Six easy ways to go to sleep with a tidy home each night:  Make the bed. The best way to start your day is by making your bed. Simply making your bed each day will have a sejal effect, allowing you to keep everything else neat and tidy. Clean as you cook. As you learn how to keep a house clean, pay close attention to clutter in the kitchen. Throw out scraps and empty packages as you use them. Wash pots and utensils while dinner roasts in the oven. Make sure dishes are put away before sitting down after meals. Cleaning as you go saves time and keeps your kitchen in tip-top shape. Grab as you go. Make it a mission to minimize clutter by picking up your belongings whenever you leave a room. Bring a pair of shoes with you when you go upstairs,  the coffee cup on the counter and take dirty laundry with you on your trip downstairs. Wipe up messes as they happen. Try not to leave any spills or small messes unattended. Take a few minutes to wipe them up with a damp cloth so youre not dealing with set-in stains at the end of the week. Sort the mail. We receive mail every day, and most of that mail turns out to be junk. Instead of letting it pile up in your mailbox or on the counter, sort it the second you walk in the door. Place bills, coupons and personal correspondence in their appropriate places the moment you bring in the mail and recycle junk mail. Sweep the kitchen floor. The kitchen often sees more traffic than the rest of the house, meaning the floor collects a lot of dirt and debris. Spend a few minutes each day sweeping the floor, and you wont see dirt being dragged through the house all week long. How to Keep a House Clean Weekly  Never underestimate the power of a weekly cleaning! No matter how successful you are with the small daily tasks, youll still have a few 20-minute tasks to complete once a week. While a weekly cleaning schedule isnt necessarily a one-size-fits-all solution, its an easy way to carve out one room at a time. As long as you follow a routine that permits you to focus on one major section of your home each day, youll never feel overwhelmed. Cleaning your home will practically take care of itself! Example weekly cleaning schedule:  Monday: Laundry and dusting  Tuesday: Bathrooms and vacuuming  Wednesday: Living room and mopping  Thursday: Bedrooms  Friday: Truett Lighter  Saturday: Organizational and miscellaneous tasks    Aside from consistent daily and weekly cleanings, you should also keep monthly and quarterly cleanings in mind. This could include mattresses, lint lines, air filters, blinds and more. By starting with your smaller tasks, you can fill in the gaps with these larger, less frequent tasks. How to Keep a House Clean Tip #4: Believe in a power clean.  Nobody likes to go to sleep knowing their house is a mess. By spending 10-15 minutes on a nightly power cleanup, you can prevent piles of clutter from forming in your home. If you can involve your family members, the more the merrier! Put on a timer, have some fun with it and focus on your top cleaning priorities. Focus on the items that pose the biggest mess (whether its your kitchen, kids bedroom or foyer) first. Then chip away at the areas that see the most traffic. Here are some zepeda areas to keep in mind:  ? Shoes in the entryway   ? Dishes in the sink   ? Items on the bathroom counter   ? Coffee table clutter   ? Toys on the living room floor  Think youve mastered how to keep a house clean? Figuring out how to keep a house clean may seem like one of lifes many mysteries. With a little time and effort, you can easily create great cleaning habits to keep your nest tidy year round. How do you keep your house clean? What are some of your most useful tips? Let us know in the comments below!              [email or web address]  [address] [facility]  August 2, 2017  How do house plants for anxiety help? Great question! House plants for anxiety help alleviate symptoms because they promote higher quality air. If you work inside or spend long hours in your home, youre breathing in indoor toxins on a regular basis. The quality of air plays a significant role in your overall health. Just think about the way you feel after climbing in a amairani attic or after spending a day in [place] or [place], where the air is overly polluted. Plants improve the quality of air, which improves the quality of your health as a whole  including your anxiety disorder. Many studies have shown house plants for anxiety to do the following:  ? Improve reaction times   ? Increase attentiveness   ? Lower blood pressure   ? Improve attendance   ?  Improve well-being ? Raise productivity   ? Improve perception   ? Lower levels of anxiety   ? Decrease mild depression   ? Increase self-satisfaction  And the list goes on. So, lets find out what the best house plants for anxiety are and how you can use them to naturally treat mental illness. [address] with Powerful Anti-Anxiety Benefits  Prepare to turn your interior into an indoor green house. Plants have an abundance of stress relief benefits that bring all kinds of natural healing right into your home. Once you receive the powerful anti-anxiety benefits of plants, youll wonder how you ever survived without them. Here are the best house plants for anxiety. Pothos Are Great for Beginners  If youre someone who struggles with keeping plants alive, the mere thought of adding more into your home can give you anxiety. Pothos are a great house plant for beginners because theyre near difficult to kill. They also help cleanse the air to increase air quality. The veins continue to grow, so one will go a long way. Succulents For Stress-Free Lazara Velázquez  Studies show that all plants, including succulents, provide mental health benefits. They can help improve concentration, productivity and mood, and can also reduce stress and anxiety. I personally love succulents for anxiety because they are hard to kill. Give them some bright light and theyll be happy. Succulents can even survive several weeks without water, making them the perfect stress-free option. Macrina Boys are Magical Plants  If youre looking for an anxiety house plant that is magical, peperomia obtusfolia is it. These plants are ideal for keeping the energy, air, and atmosphere in your home ideal for mental health.  They reduce carbon dioxide levels, increase humidity, reduce certain pollutants, keep air temperatures down and reduce dust.  Air Plants for Anxiety Relief Certainly by now, youre seeing a theme  stress-free, easy house plants for anxiety. Air plants are definitely on the list of the best plants for anxiety relief, as these beautiful pieces of the earth are incredibly easy to take care of. After all, you just have to soak them in a cup of water once a week. As for the mental health benefits of air plants, theyre just as awesome. Theyre super low-maintenance and help reduce carbon dioxide in the air while also removing chemical pollutants and adding oxygen back into the air to help you breathe better. Birds Nest Melide for Whatever are one of my favourite house plants for anxiety because they filter formaldehyde, xylene and toluene from the air. This allows for optimal air quality in your home, which as you already know, is key to using plants to boost your mental health. Philodendron Plants for Removing Nasty Toxins  Grab some philodendron plants and place them in every 100 square feet of living space for maximum benefits. Philodendron plants are beautiful plants that offer an abundance of air purification qualities. They act as a vacuum, sucking out all the toxins in the air, particularly formaldehyde. Oxalis Plants Are Natural Healing Herbs  Dont be afraid to think outside of the pot. Herbs can be a beautiful and therapeutic addition to your indoor plant collection. Héctor Valdezois is anti-inflammatory, a diuretic, a relaxant, a stomachic, an astringent, an analgesic and styptic in nature, as well as many other things. Its edible too! Add this herb into your home for all of your natural anxiety relief. Gentry Benavides for [employer]ira [employer]  If theres one houseplant for anxiety that provides super purifying qualities, its the calathea. These plants are great for clearing the air, leaving it pure and therapeutic. It helps create an ideal indoor climate and is quite interested to watch from day to night, as the leaves close and open. ZZ Plants Remove a Significant Amounts of Toxins  Studies show that ZZ plants for anxiety are great, as they can remove significant amounts of xylene, toluene, benzene and ethylbenzene from the air. Its also one of the toughest plants out there, perfect for those with a brown thumb. Snake Plants for a Boost of Energy  If youre like me, headaches are one of your worst anxiety symptoms. So, I like to add snake plants into my home to instantly decrease this terribly side effect of having anxiety. As a bonus, snake plants also improve energy levels which is something that instantly vanishes upon a panic attack. The anti-anxiety benefits dont end there either. This house plant for anxiety can also improve concentration, sleep and reaction times, while also lowering overall levels of anxiety. HARLEY even uses it! [address] for an Abundance of Self Love  Just look at this houseplant for anxiety. Its a little heart! In addition to having air-purifying qualities, this little beauty called a Hoya Heart plant, will remind you every day to practice self love. It also grows very, very slowly, so it will this cute for years to come. Money Plant for Reduced Arguments and Anxiety  If youre looking to fill your home with good vibes that fight off those anxiety attacks, a money plant is it. This houseplant for anxiety energizes the home by filtering air and removing toxins while also increasing oxygen inflow. Remi Alcantar experts suggest placing a money plant in front of a sharp corner or angle to reduce anxiety. Experts believe this plant also helps prevent arguments and sleep disorders.   Parlor Palm for Removing Anxiety Causing Toxins As a self-proclaimed beach bum, Danika Bhakta is another one of my favourite house plants for anxiety. Its an air purifying plant that adds moisture into the atmosphere to remove toxins that increase worry, stress, and anxiety. Its also incredibly easy to care for. So, add this plant in your bedroom and household for optimal wellness and mindfulness. Additional Potted Flowers and Houseplants for Anxiety  The good news doesnt end there. Here are some common potted flowers and houseplants for anxiety relief. [employer] is The Most Calming Scent for Anxiety and Sleep  I simply cant start this list of house plants for anxiety without first mentioning the best of them all  lavender. Les Richard find a ton of content here on Anxiety Gone about lavender because it is one of the best natural anxiety treatments out there. After all, a quick search online and youll see that everyone from doctors to therapist to anxiety bloggers noting lavender to be one of the best anxiety fighters. But why, you ask? Lavenders scent has proven to lower heart rate (goodbye palpitations), decrease stress levels, and lowers blood pressure  and thats only to name a few. Its most commonly found in essential oils for anxiety and room sprays. However, the flowers serve the same anti-anxiety benefits with the addition of adding some beauty into your space. So, grow a lavender plant in your home or garden. Madison Mcnally from the Plants Improve Sleep  This house plant for anxiety is often found outdoors but it can also thrive inside. Zev Bruner is a shrub that brings sweet sensations to your mental health while also improving your sleep. As many anxiety sufferers know, sleep is our nemesis. So, put this plant in your bedroom and youll finally be able to fall asleep without over thinking absolutely everything. As a result, youll wake up more alert! Thats also not to mention that when you get a good sleep, your mental wellbeing is increased. Youve likely noticed the less sleep you get, the worse your anxiety is. So, sleep with this shrub and youll be coping with anxiety with next to little effort. You can get [address] for $12 and have an abundance of natural anxiety relief blossoming within your home. Aloe Vera Plant for Natural Healing and Anxiety Relief  One of my favourite house plants for anxiety is aloe vera because its nearly impossible to kill  and I have a serious brown thumb. No, I have a black thumb. I kill most succulents and cacti. So, aloe vera is always at the top of my list for plants for natural anxiety treatment. However, its anti-anxiety benefits also make it one of the best.  Aloe vera is commonly used as a herbal remedy, as it can treat physical ailments while also purifying the air drastically. With this plant in your bedroom and around your home, you can eliminate worry and stress from your mind, body, and spirit simply by getting a breath of fresh air  indoors. Audra Drastically Improves Air Quality  Red Vic only a herb to sprinkle on your dinner. Its also an amazing house plant for anxiety, as it will drastically improve air quality. As a result, you will decrease anxiety, reduce stress, improve memory function, and feel like a better human being all around. I personally prefer the rosemary spiral plants instead of little herb pots, as they emit more anti-anxiety benefits.   Chrysanthemum Decreases Symptoms of Stress I may not be able to pronounce this house plant for anxiety but I do know it has an abundance of medicinal benefits. Chrysanthemum (also known as mums) improve air quality which instantly decreased symptoms of stress, anxiety, and worry. Its often used in teas to relax the mind, body, and spirit but if tea aint your thing, the plant itself is just fine. As a bonus, the added spark of vibrancy in your home is certain to increase your moods. Dont believe me? Chrysanthemum translates to gold and flower. You can also use it to treat chest pain, fever, type 2 diabetes, headache, swelling, high blood pressure, dizziness, with the best benefit being its power to relax the nervous system. Plus, you can get 200 mum seeds for $12 for year-round anxiety relief. English Ivy Reduces Mold by 94%  Dont make the mistake of picking ivy from your local forest  its not the same thing. Plus, the itchy, swelling and irritation that follows wont help your anxiety. Instead, make sure you purchase English Juany to receive the anti-anxiety benefits. This plant reduces mold by 94% in as little as 12 hours. Amazing, right? It also removes formaldehyde and improves air quality which instantly improves your sleep, which leads to less anxiety the next morning. Red Edged Dracaena Promote Relaxation  Bring on the air purifying properties with the red edged dracaena. This house plant for anxiety will help promote a relaxation, clean ambiance within your home while also eliminating the toxins in the air. With less formaldehyde, trichloroethylene and xylene in the air, your stress levels and anxiety are instantly decreased, and your concentration is instantly improved.   Barbie Puff Removes Toxins Associated With Principal [employer] For those of you who are looking for flowers for anxiety and increased air quality, Margarita Shed daisies are great. Although, I personally dont like daisies, I certainly like the anti-anxiety benefits of these ones. Jacquelin Patricio noted as being one of the most effective air purifying plants that removes all kinds of chemicals from the space. More specifically, shiberwendy daisies have been shown to have the ability to remove toxins associated with inks. So, this is a great plant for your bedroom, office and work space. Chamomile Dispels Stress and Negativity  Chamomile is one of those house plants for anxiety that are often added into anxiety essential oils, room sprays, and teas. However, its also an excellent plant to add into your household as it dispels stress, as well as negative energies and emotions. The scent is incredibly calming and soothing, and work wonderfully when feeling jittery. Plus, you cant go wrong with this chamomile grow kit. I actually use Chamomile within my skincare products as well, just to give myself that extra jolt of anti-anxiety benefits  and beauty. Skullcap Reduces Stress and Tension  The name of this house plant for anxiety may not be too enticing but dont underestimate its anti-anxiety benefits. Skullcap is an American plant that is widely considered to be one of the most comforting herbs. It naturally reducing stress and anxiety, as well as tension and nervousness. So, dont underestimate the power of natural anxiety relief from the earth. These plants and herbs for anxiety will make a huge difference within your home  and mental health. MetroFlorists.tn. com/pollennation/plants-anti-anxiety-benefits/  8 Plants with Amazing Anti-Anxiety Benefits  April 21, 2017   Plants are not just for the garden as they have a number of health benefits too, including the ability to soothe headaches, calm ailments and reduce everyday stresses and strains. If you spend a lot of time indoors, be this due to long working hours or weather conditions, poor air quality is bound to affect your health. In fact, indoor toxins can have a detrimental effect on our health. Such toxins can cause allergies, asthma, inflammation, anxiety, stress and even certain types of cancer. Thankfully, there are a number of ways to improve your indoor health, one of which includes bringing the benefits of the outdoors inside. One cost-effective way to do this is to invest in an array of house plants. Read on to learn more about their many benefits    1. Pérez helps to improve sleep quality  Pérez, a shrub found outdoors, can also thrive indoors. Its fragrance is not only sweet to our senses, its proven to promote a better nights sleep. When surrounded by pérez, its likely youll feel less anxious and more at peace, which results in waking up more alert. Sleep plays an important role in our physical and mental wellbeing. If youre lacking in sleep, you may find you have trouble making decisions, controlling your emotions, solving problems and coping with change. 2. Lavender helps to lower anxiety and stress  Lavender comes from the mint family and is most commonly used as an essential oil. Asides from it beautiful scent, its proven to lower stress and anxiety levels. It also improves sleep, lowers heart rate and calms agitated babies. 3. Anjana Abler helps to improve your memory  Anjana Abler, a perennial herb and a member of the mint family that is native to the [address] [place] [address] region. As well as improving air quality, its also said to improve memory function, reduce stress and banish anxiety.   4. Aloe Vera improves overall air quality Aloe Vera, predominantly used as a herbal remedy, is a succulent plant species that has been around since the beginning of the first century AD. Renowned for soothing scars, inflammation and sunburned skin, Aloe Vera Is said to be among the most powerful plant air purifiers in the world. Asides from lowering carbon dioxide levels at night, this plant species also helps to remove harmful chemicals such as formaldehyde and benzene from the air. Its also said to have effectively treated symptoms of anxiety. If you wish to improve the overall quality of the air in your home or office, these plants are a great investment  especially to those wishing for a restful nights sleep  a must if you wish to rid your body and mind of worry and stress. Its also a great option for those looking for a low maintenance plant as it requires minimum water and attention. In fact, the Egyptians nicknamed it the plant of immortality because of its resilience. 5. Chrysanthemum has many medicinal benefits   This hot-hued plant not only adds colour and vibrancy to your home, but its proven to purify air, lessen symptoms of worry and stress and cool and relax our bodies when taken as a tea. Its name originates from the POP[employer]OX words gold and The RxVault.in. Asides from its air purifying benefits, this plant specie also boasts a number of medicinal benefits. It is often used to treat high blood pressure, chest pain, fever, type 2 diabetes, cold, headache, swelling and dizziness. It can also be used to treat prostate cancer when combined with other herbs and is a popular tea in [facility]. This tea boasts both calming and relaxing benefits, which can help to treat the symptoms of anxiety by relaxing the nervous system.   3. Talbot Kanner Plant reduces mold and promotes a good nights sleep Native to [place] and [facility], Barbara Kirkland is a species of flowering plant. This particular specie is a great  to those who wish to calm their allergies, particularly if they suffer from asthma. Its said to lower airborne mould by 94% in as little as 12 hours. Its little wonder its one of the most efficient plants to invest in when it comes to removing formaldehyde from the air and promoting a good nights sleep! 4. Areca Palm purifies air  The Areca palm is another popular air purifying plant, which many have in both their homes and offices. Asides from being extremely easy to care for (providing you dont overwater it), its able to add moisture to the surrounding atmosphere. Its proven to remove unwanted toxins such as formaldehyde, benzene and trichloroethylene from the air. Breathing in purer air (both when awake and asleep) helps to lower anxiety levels and blood pressure, which in turn helps us to calm our mental and physical state. 5. Val Sanderson is a great plant for the garage  This plant species is another powerful tool to invest in when it comes to tackling poor air quality and stress affecting factors. The Val Sanderson comes in the shape of a fast-growing vine that will create a waterfall of green when displayed in a hanging basket. Having such a plant in your office or home is a good way to promote a relaxing ambiance. One of the unique features of this plant is its ability to stay green, even when kept in a dark room. To keep this plant in tip-top condition, ensure it gets plenty of bright, indirect light during the day and avoids overwatering, as this will cause root rot to set in.  6. Red-edged Calderon Turner and purifies the home  Asides from bringing a pop of color into your home, with its purple-red edged leaves, this fast-growing shrub is also celebrated for its air-purifying properties, which in turn promote a clean, relaxing ambiance. It has the ability to rid the air of chemicals including xylene, trichloroethylene, and formaldehyde  all of which can find their way into the home through the use of varnishes, lacquers, and gasoline. These harmful chemicals can cause heightened stress levels, a lack of concentration and increased anxiety. 7. Shoshana Cook helps to remove toxins from the air  This picture-perfect, brightly colored, flowering plant is an extremely effective air purifier. Its renowned for being able to remove Trichloroethylene from the home  this is a chemical that you may (unbeknown to yourself) bring home with your dry cleaning. It can also remove benzene from the air  a chemical associated with inks. Add one to your office, laundry room or bedroom to promote a calming ambiance and restful nights sleep. Its only requirement is lots of light and well-drained soil, so be sure to invest in a vessel that boasts good drainage holes. To promote healthy growth, mist the leaves a few times a week. When this flower blooms, its colorful petals can last up to two weeks. The pretty hue not only adds energy to our homes but having brightly colored plants in the house and office also helps to promote happiness. 8. Snake Plant prevents headaches  Native to [address] [facility] belongs to the Esmeralda family. If you suffer from eye irritation, headaches or breathing problems, this is a great plant to install in your home. It also improves energy levels. [facility] was amongst the first to discover the benefits offered by the snake plant. During their studies, they sealed a single snake plant in an enclosed chamber for 24 hours, whilst releasing a number of toxic gases. Results showed Benzene levels were reduced by 53% and Trichloroethylene by 13.4%. Having plants in the home and office environment is a must if you wish to lower blood pressure, improve well-being, increase productivity and raise job satisfaction. Other benefits include improving reaction times, increasing concentration, promoting a restful nights sleep and lowering levels of anxiety. Going green certainly has its perks! Sources: InitMe. NextStep.io, 4INFO Living.com        1. Keep a sleep journal of how many hours you are sleeping, then we will review this at next appointment. 2.  Begin taking Melatonin nightly- by 9 or 9:30 p.m.  3.  What is Melatonin? Melatonin is a natural hormone made by your body's pineal (pi-knee-gretchen) gland. This is a pea-sized gland located just above the middle of the brain. During the day the pineal is inactive. When the sun goes down and darkness occurs, the pineal is \"turned on\" by the SCN and begins to actively produce melatonin, which is released into the blood. Usually, this occurs around 9 pm. As a result, melatonin levels in the blood rise sharply and you begin to feel less alert. Sleep becomes more inviting. Melatonin levels in the blood stay elevated for about 12 hours - all through the night - before the light of a new day when they fall back to low daytime levels by about 9 am. Daytime levels of melatonin are barely detectable. 4.  You may want to consider pushing back your bedtime to p.m. So that you can sleep through the night. 5.  Recommendation - [facility] White noise machine. Sold on [employer] 6.  Download the smart phone glenroy \"Dormio\" which is a White Noise glenroy with free tracks and a timer to use for calming, relaxation, sleep or meditation. 7.  Return in 2 weeks as scheduled. Call Corona Espinoza at [phone] if you need to come in earlier or reschedule.        Sleep Hygiene Guidelines Good dental hygiene is important in determining the health of your teeth and gums. We all know we are supposed to brush and floss regularly. Those who do so are more likely to have strong, healthy gums and less cavities. Similarly good sleep hygiene is important in determining the quality and quantity of your sleep. Below are guidelines for good sleep hygiene practices. Review these guidelines and evaluate how well you practice good sleep hygiene. Caffeine:  Avoid Caffeine 6-8 Hours Before Bedtime  Caffeine disturbs sleep, even in people who do not think they experience a stimulation effect. Individuals with insomnia are often more sensitive to mild stimulants than are normal sleepers. Caffeine is found in items such as coffee, tea, soda, chocolate, and many over-the-counter medications (e.g., Excedrin). Thus, drinking caffeinated beverages should be avoided near bedtime and during the night. You might consider a trial period of no caffeine if you tend to be sensitive to its effects. Nicotine:  Avoid Nicotine Before Bedtime   Although some smokers claim that smoking helps them relax, but nicotine is a stimulant. The initial relaxing effects occur with the initial entry of the nicotine, but as the nicotine builds in the system it produces an effect similar to caffeine. Thus, smoking, dipping, or chewing tobacco should be avoided near bedtime and during the night. Dont smoke to get yourself back to sleep.     Sleeping Pills:  Sleep Medications are Effective Only Temporarily Scientists have shown that sleep medications lose their effectiveness in about 2 - 4 weeks when taken regularly. Despite advertisements to the contrary, over-the-counter sleeping aids have little impact on sleep beyond the placebo effect. Over time, sleeping pills actually can make sleep problems worse. When sleeping pills have been used for a long period, withdrawal from the medication can lead to an insomnia rebound. Thus, after long-term use, many individuals incorrectly conclude that they need sleeping pills in order to sleep normally. Keep use of sleep pills infrequent, but dont worry if you need t use one on an occasional basis. Regular Exercise  Get regular exercise, preferably 40 minutes each day of an activity that causes sweating. .  Exercise in the late afternoon or early evening seems to aid sleep, although the positive effect often takes several weeks to become noticeable. Exercising sporadically is not likely to improve sleep, and exercise within 2 hours of bedtime may elevate nervous system activity and interfere with sleep onset. Hot Baths  Spending 20 minutes in a tub of hot water an hour or two prior to bedtime may promote sleep and is strongly recommended. Bedroom Environment: Moderate Temperature, Quiet, and Dark  Extremes of heat or cold can disrupt sleep. A quiet environment is more sleep promoting than a noisy one. Noises can be masked with background white noise (such as the noise of a fan) or with earplugs. Bedrooms may be darkened with black-out shades or sleep masks can be worn. Position clocks out-of-sight since clock-watching can increase worry about the effects of lack of sleep. Be sure your mattress is not too soft or too firm and that your pillow is the right height and firmness.     Eating Planned Improvements of My Sleep Hygiene    Check Those  That Apply  _____ Avoid Caffeine 6-8 Hours Before Bedtime. I will not have caffeine after ________ PM.    ____ Avoid Nicotine Before Bedtime. I will not have a cigarette after _________ PM.    ______ Avoid Use of Sleeping Pills. (If you are currently using them regularly, all changes should be medical supervised by your medical provider). ______ Do Exercise Regularly, But Not Within 2 Hours of Bedtime. I ________________ for ____ minutes, on the following days ____________________________________________________    ______ Ensure your Bedroom is a Comfortable Temperature, Quiet, and Dark and Your Mattress and Pillow are good. I will make the  following changes to my bedroom ____________________________________________________________________________    ______ Do Take a Hot Bath 1-2 Hours Prior to Bedtime. I will take a hot bath about ______ PM.    ______ Eat a Light Snack at Bedtime but Avoid Large or Problematic Foods. I will eat  __________________  or _____________________ or __________________ before bed.    ______ Avoid Naps. I try not to nap, if I must, I will limit it to _______ minutes, about 8 hours after I awoke and will use alarm to limit my nap time. ______ Limit Time In Bed. I have been sleeping on average ______ hours per night, therefore I will limit my time in bed to _____ hours (the same number). If Im not asleep in about 15 to 20 minutes I will get up and not return to bed until Im sleepy.    ______ Stay on a Regular Sleep Schedule  I will get up at _______ AM, 7 days a week, no matter how poorly I slept that night. 5.  Allow your hands to loosen so that there are spaces between your fingers. 6.  Uncross your ankles or legs. 7.  Feel your thighs sink into your chair, let your legs fall comfortably apart. 8.  Feel your shins and calves become heavier and your feet rest heavy on the floor. 9.  Now breathe in slowly and out slowly. 10.  Each time you breathe out try to relax even more. Personal Thought  Control. Our thinking often creates anxiety for us. Getting better control of our thinking can go a long way in helping us cope. The following steps can be useful. 1. Let yourself become aware of thoughts you have when you are anxious. What are the words that you are saying to yourself at that moment? Sometimes it takes a little practice before we become aware of our thoughts. Some examples might be:  I know something bad is going to happen, or This is horrible or Layo Wan is this happening to me!?  2. Write your thoughts down. Its much easier to work with our thoughts, analyze them, and replace them if they are in black and white.   3. Ask yourself the following questions about your thoughts:  a. Is it true? (Is it logically correct? Where is the evidence to support the truth of that thought? Are there alternative ways of thinking that would be more correct?). If a thought is not as true as it could be, replace it with a more realistic and helpful one. The majority of thoughts we have that generate anxiety are not the most realistic appraisals of the situation. b. So what? (If this is logically correct, what does it mean to me? Is there anything I can do about the situation? Is it in my best interest to get anxious about this?). 4. Use coping self-statements. When feeling anxious, you may be able to tell yourself automatic phrases without thinking too much about it. A couple of examples would be phrases such as Its OK, I can handle it, or Ive been through things like this before and have done all right.   Notice that these statements tend to be true for all of us. 5. Notice a change in your emotional state as you change your thinking. As your thoughts become more realistic, you will probably notice a decrease in anxiety and tension, and an increase in your ability to cope. Constructive Worry Worksheet  Concerns [employer]     1. ________________________                        2.  ______________________                        3.  _______________________     1. __________________________        2. __________________________        3. __________________________        1.  _________________________        2. __________________________        3. __________________________        1. __________________________        2. __________________________        3.  __________________________           Constructive Worry Instructions  When we have challenges, we tend to use our problem-solving skills to make our lives better and to relieve ourselves of anxiety. It is not surprising that some of us may use our problem-solving skills at the wrong time and place, namely bedtime. We may think about a problem, trying to solve it, but unfortunately this will oftentimes keep us awake. Constructive worry is a method for managing the tendency to worry during that quiet time when sleep is supposed to be taking over. Do this exercise early in the evening (at least 2 hours before bed). It should take only about 15 minutes to complete. Here is how it is done:  1. Write down the problem(s) facing you that has the greatest chance of keeping you awake a bedtime, and list them in the Concerns column of the [address]

## 2021-02-16 RX ORDER — VENLAFAXINE HYDROCHLORIDE 75 MG/1
CAPSULE, EXTENDED RELEASE ORAL
Qty: 90 CAPSULE | Refills: 0 | OUTPATIENT
Start: 2021-02-16

## 2021-02-16 RX ORDER — AMITRIPTYLINE HYDROCHLORIDE 25 MG/1
25 TABLET, FILM COATED ORAL NIGHTLY
Qty: 90 TABLET | Refills: 0 | OUTPATIENT
Start: 2021-02-16

## 2021-02-17 NOTE — TELEPHONE ENCOUNTER
Call placed to pt, message left on VM that appt for 2/17/21 was cancelled r/t weather conditions, that appt would be rescheduled.

## 2021-03-10 ENCOUNTER — VIRTUAL VISIT (OUTPATIENT)
Dept: PSYCHIATRY | Age: 41
End: 2021-03-10
Payer: COMMERCIAL

## 2021-03-10 DIAGNOSIS — F33.0 MILD EPISODE OF RECURRENT MAJOR DEPRESSIVE DISORDER (HCC): Primary | ICD-10-CM

## 2021-03-10 PROCEDURE — 98968 PH1 ASSMT&MGMT NQHP 21-30: CPT | Performed by: SOCIAL WORKER

## 2021-03-10 NOTE — PATIENT INSTRUCTIONS
For self and teens        Breaking the Rules (Battling Shoulds)  Situation:         Rule/Should that was violated:        Influencing Factors  Fears: Other emotions triggered:          Needs:           Pain or pleasure triggered by the situation:          History (old experiences that might influence a response):           Beliefs, values, attitudes:           The behavior of others:

## 2021-03-10 NOTE — PROGRESS NOTES
Kamaljit Gamez is a 36 y.o. female evaluated via telephone on 3/10/2021. Consent:  She and/or health care decision maker is aware that that she may receive a bill for this telephone service, depending on her insurance coverage, and has provided verbal consent to proceed: Yes      Documentation:  I communicated with the patient and/or health care decision maker about see progress note. Details of this discussion including any medical advice provided: see progress note. I affirm this is a Patient Initiated Episode with a Patient who has not had a related appointment within my department in the past 7 days or scheduled within the next 24 hours. Patient identification was verified at the start of the visit: Yes    Total Time: minutes: 21-30 minutes    The visit was conducted pursuant to the emergency declaration under the [address] [place] [address], [address] [place] [address] authority and the [employer] General Act. Patient identification was verified, and a caregiver was present when appropriate. The patient was located in a state where the provider was credentialed to provide care. Note: not billable if this call serves to triage the patient into an appointment for the relevant concern      Araseli Cross     Therapy Progress Note  [facility] PETERS, [facility]  3/10/2021  10:44 AM  11:30 AM    Patient location  : home  [facility] location : [address] [place] [address]      Time spent with Patient: 46 minutes  This is patient's 15th  Therapy appointment. Reason for Consult:  depression and anxiety  Referring Provider: No referring provider defined for this encounter. Kamaljit Gamez ,a 36 y.o. female, for initial evaluation visit.      Pt provided informed consent for the behavioral health program. Discussed with patient model of service to include the limits of confidentiality (i.e. abuse reporting, suicide intervention, etc.) and short-term intervention focused approach. Discussed no show and late cancellation policy. Pt indicated understanding. S:  \"The drama is back. \" Pt shared events and issues that has been occurring with her children's father and his wife/fiance/partner. Pt shared about son being manipulated by his father, her daughter getting caught with smoking weed at work by her father, the father had called the police on their daughter, father manipulating daughter with a car/car insurance, father's family believes he has been brain washed. Pt reported she has not been negativity evolved with \"fighting back\" with her children's father and this has allowed her children to see pt is not the issue. Pt reported she would like to continue the 4 weeks appointments and verbalized she would call sooner if needed. Pt denies Suicidal Ideations, Homicidal Ideation, Auditory Hallucinations, Visual Hallucinations, Tactical Hallucinations.     MSE:    Sounded    alert, cooperative, moderate distress  Appetite normal  Sleep disturbance No  Fatigue No  Loss of pleasure No  Impulsive behavior No  Speech    normal rate and normal volume  Mood    Stable    Thought Content    intact  Thought Process    goal directed  Associations    logical connections  Insight    Good  Judgment    Intact  Orientation    oriented to person, place, time, and general circumstances  Memory    recent and remote memory intact  Attention/Concentration    intact  Morbid ideation No  Suicide Assessment    no suicidal ideation      History:  Social History     Socioeconomic History    Marital status:      Spouse name: Not on file    Number of children: 2    Years of education: assoc degree    Highest education level: Not on file   Occupational History    Not on file   Social Needs    Financial resource strain: Not hard at all   [employer] insecurity     Worry: Never true     Inability: Never true   [employer] needs     Medical: No     Non-medical: No   Tobacco Use    Smoking status: Current Every Day Smoker     Packs/day: 1.00     Start date: [address] tobacco: Never Used    Tobacco comment: 6/5/19 pt given in on smoking and smoking cessation. Substance and Sexual Activity    Alcohol use: Yes     Alcohol/week: 2.0 standard drinks     Types: 1 Glasses of wine, 1 Shots of liquor per week     Frequency: Monthly or less     Drinks per session: 1 or 2     Comment: \"I used to drink a lot. I drink 2 to 3 drinks a month now\".     Drug use: Yes     Types: Marijuana     Comment: uses 9/4/20 marijuana every day     Sexual activity: Not Currently     Comment: 9/4/20 no birth control, not sexually active   Lifestyle    Physical activity     Days per week: Not on file     Minutes per session: Not on file    Stress: Not on file   Relationships    Social connections     Talks on phone: Not on file     Gets together: Not on file     Attends Moravian service: Not on file     Active member of club or organization: Not on file     Attends meetings of clubs or organizations: Not on file     Relationship status: Not on file    Intimate partner violence     Fear of current or ex partner: Not on file     Emotionally abused: Not on file     Physically abused: Not on file     Forced sexual activity: Not on file   Other Topics Concern    Not on file   Social History Narrative    PREVIOUS MEDICATION TRIALS    Ambien    Effexor XR (current, 150mg)    Prozac (several years, 40mg, doesn't remember why she stopped)    Elavil    Zoloft (several years, doesn't remember the dose, stopped taking because she was pregnant)    Xanax         No negative effect with taking SSRI's       Medications:   Current Outpatient Medications   Medication Sig Dispense Refill    venlafaxine (EFFEXOR XR) 75 MG extended release capsule TAKE 1 CAPSULE BY MOUTH ONCE DAILY (WITH 150MG TO MAKE 225MG DOSE) 90 capsule 0    venlafaxine (EFFEXOR XR) 150 MG extended release capsule Take 1 capsule by mouth daily 90 capsule 1    cloNIDine (CATAPRES) 0.1 MG tablet Take 1 tablet by mouth nightly 90 tablet 1    busPIRone (BUSPAR) 30 MG tablet Take 30 mg by mouth 2 times daily 180 tablet 1    amitriptyline (ELAVIL) 25 MG tablet Take 1 tablet by mouth nightly 90 tablet 1    adalimumab (HUMIRA) 40 MG/0.8ML injection Inject 40 mg into the skin every 14 days Indications: Psoriasis      metoprolol tartrate (LOPRESSOR) 25 MG tablet 25 mg nightly   11    atorvastatin (LIPITOR) 80 MG tablet Take by mouth nightly   11    BRILINTA 90 MG TABS tablet Take 90 mg by mouth nightly   11    ondansetron (ZOFRAN-ODT) 4 MG disintegrating tablet Take 4 mg by mouth every 6 hours as needed for Nausea or Vomiting      aspirin 81 MG tablet Take 81 mg by mouth daily       No current facility-administered medications for this visit. Social History:   Social History     Socioeconomic History    Marital status:      Spouse name: Not on file    Number of children: 2    Years of education: assoc degree    Highest education level: Not on file   Occupational History    Not on file   Social Needs    Financial resource strain: Not hard at all   [employer] insecurity     Worry: Never true     Inability: Never true   [employer] needs     Medical: No     Non-medical: No   Tobacco Use    Smoking status: Current Every Day Smoker     Packs/day: 1.00     Start date: 56    Smokeless tobacco: Never Used    Tobacco comment: 6/5/19 pt given in on smoking and smoking cessation. Substance and Sexual Activity    Alcohol use: Yes     Alcohol/week: 2.0 standard drinks     Types: 1 Glasses of wine, 1 Shots of liquor per week     Frequency: Monthly or less     Drinks per session: 1 or 2     Comment: \"I used to drink a lot. I drink 2 to 3 drinks a month now\".     Drug use: Yes     Types: Marijuana     Comment: uses 9/4/20 marijuana every day     Sexual activity: Not Currently     Comment: 9/4/20 no birth control, not sexually active   Lifestyle    Physical activity     Days per week: Not on file     Minutes per session: Not on file    Stress: Not on file   Relationships    Social connections     Talks on phone: Not on file     Gets together: Not on file     Attends Restorationism service: Not on file     Active member of club or organization: Not on file     Attends meetings of clubs or organizations: Not on file     Relationship status: Not on file    Intimate partner violence     Fear of current or ex partner: Not on file     Emotionally abused: Not on file     Physically abused: Not on file     Forced sexual activity: Not on file   Other Topics Concern    Not on file   Social History Narrative    PREVIOUS MEDICATION TRIALS    Ambien    Effexor XR (current, 150mg)    Prozac (several years, 40mg, doesn't remember why she stopped)    Elavil    Zoloft (several years, doesn't remember the dose, stopped taking because she was pregnant)    Xanax         No negative effect with taking SSRI's       TOBACCO:   reports that she has been smoking. She started smoking about 25 years ago. She has been smoking about 1.00 pack per day. She has never used smokeless tobacco.  ETOH:   reports current alcohol use of about 2.0 standard drinks of alcohol per week. Family History:   Family History   Problem Relation Age of Onset    High Blood Pressure Mother     Depression Mother     Anxiety Disorder Mother     Heart Attack Father     Heart Disease Father     High Cholesterol Father        Diagnosis:    Major depressive disorder; recurrent and mild      Diagnosis Date    CAD (coronary artery disease)     MI about 1 1/2 yrs ago    CHF (congestive heart failure) (HCC)     Hyperlipidemia     Hypertension     kidney stones     VINOD (obstructive sleep apnea)     uses CPAP    Psoriasis     Psychiatric problem     hx of depression and anxiety since a teenager. Problems with primary support group    Plan:  1. Continue medication management  2.  CBT to target cognitive distortions  3. Discuss therapeutic goals  A.  Setting boundaries   B. Assertive Communications    Pt interventions:  Trained in strategies for increasing balanced thinking, Provided education, Discussed self-care (sleep, nutrition, rewarding activities, social support, exercise), [place]-setting to identify pt's primary goals for [facility] visit / overall health, Supportive techniques, Emphasized self-care as important for managing overall health and Identified maladaptive thoughts      Dontae Ornelas MSW, LCSW

## 2021-03-16 RX ORDER — VENLAFAXINE HYDROCHLORIDE 150 MG/1
CAPSULE, EXTENDED RELEASE ORAL
Qty: 90 CAPSULE | Refills: 0 | Status: SHIPPED | OUTPATIENT
Start: 2021-03-16 | End: 2021-08-12 | Stop reason: SDUPTHER

## 2021-03-16 RX ORDER — AMITRIPTYLINE HYDROCHLORIDE 25 MG/1
25 TABLET, FILM COATED ORAL NIGHTLY
Qty: 90 TABLET | Refills: 0 | Status: SHIPPED | OUTPATIENT
Start: 2021-03-16 | End: 2021-06-16

## 2021-03-16 RX ORDER — VENLAFAXINE HYDROCHLORIDE 75 MG/1
CAPSULE, EXTENDED RELEASE ORAL
Qty: 90 CAPSULE | Refills: 0 | Status: SHIPPED | OUTPATIENT
Start: 2021-03-16 | End: 2021-08-12 | Stop reason: SDUPTHER

## 2021-03-16 NOTE — TELEPHONE ENCOUNTER
Margoth Aponte called to request refill on her medication      Last office visit: 3/10/2021  Next office visit: 4/12/2021    Requested Prescriptions     Pending Prescriptions Disp Refills    venlafaxine (EFFEXOR XR) 75 MG extended release capsule [Pharmacy Med Name: Venlafaxine HCl ER 75 MG Oral Capsule Extended Release 24 Hour] 90 capsule 0     Sig: TAKE 1 CAPSULE BY MOUTH ONCE DAILY (WITH  150  MG  TO  MAKE  225  MG  DOSE)    venlafaxine (EFFEXOR XR) 150 MG extended release capsule [Pharmacy Med Name: Venlafaxine HCl  MG Oral Capsule Extended Release 24 Hour] 90 capsule 0     Sig: Take 1 capsule by mouth once daily    amitriptyline (ELAVIL) 25 MG tablet [Pharmacy Med Name: Amitriptyline HCl 25 MG Oral Tablet] 90 tablet 0     Sig: Take 1 tablet by mouth nightly          Virtual Visit    12/10/2020  [address] [facility]   GILMA Burris - NP  Nurse Practitioner Psychiatric/Mental Health  Moderate episode of recurrent major depressive disorder ([facility] [address] +3 more  Dx  Follow-up , Anxiety , Depression  Reason for Visit   Progress Notes             Scan on 12/11/2020 11:23 AM by Bianca Callahan MA: [id number] on 12/11/2020 11:23 AM by Bianca Callahan MA: [email or web address]-[email or web address]   Progress Notes    Expand AllCollapse All  []Expand All by Default  Margoth Aponte is a 36 y.o. female evaluated via telephone on 12/10/2020.       Consent:  She and/or health care decision maker is aware that that she may receive a bill for this telephone service, depending on her insurance coverage, and has provided verbal consent to proceed: Yes        Documentation:  I communicated with the patient and/or health care decision maker about anxiety/depression.    Details of this discussion including any medical advice provided: see below        I affirm this is a Patient Initiated Episode with a Patient who has not had a related appointment within my department in the past 7 days or scheduled within the next 24 hours.     Patient identification constipation, abdominal pain, bright red stools, black tarry stools, stool incontinence)     Genitourinary:  (pelvic pain, burning or frequency of urination, urinary urgency, blood in urine incomplete bladder emptying, urinary incontinence, STD; MEN: testicular pain or swelling, erectile dysfunction; WOMEN: LMP, heavy menstrual bleeding (menorrhagia), irregular periods, postmenopausal bleeding, menstrual pain (dymenorrhea, vaginal discharge)     Musculoskeletal: (bone pain/fracture, joint pain or swelling, musle pain)     Integumentary: (rashes, acne, non-healing sores, itching, breast lumps, breast pain, nipple discharge, hair loss)     Neurologic: (HA, muscle weakness, paresthesias (numbness, coldness, crawling or prickling), memory loss, seizure, dizziness)     Psychiatric:  (anxiety, sadness, irritability/anger, insomnia, suicidality)     Endocrine: (heat or cold intolerance, excessive thirst (polydipsia), excessive hunger (polyphagia))     Immune/Allergic: (hives, seasonal or environmental allergies, HIV exposure)     Hematologic/Lymphatic: (lymph node enlargement, easy bleeding or bruising)     History obtained via chart review and patient     PCP is Dale Posada, DO         Current Meds:     Home Medications           Prior to Admission medications    Medication Sig Start Date End Date Taking?  Authorizing Provider   venlafaxine (EFFEXOR XR) 75 MG extended release capsule TAKE 1 CAPSULE BY MOUTH ONCE DAILY (WITH 150MG TO MAKE 225MG DOSE) 11/23/20     GILMA Bess NP   venlafaxine (EFFEXOR XR) 150 MG extended release capsule Take 1 capsule by mouth daily 11/23/20     GILMA Bess NP   cloNIDine (CATAPRES) 0.1 MG tablet Take 1 tablet by mouth nightly 9/4/20     GILMA Bess NP   busPIRone (BUSPAR) 30 MG tablet Take 30 mg by mouth 2 times daily 9/4/20     GILMA Bess NP   amitriptyline (ELAVIL) 25 MG tablet Take 1 tablet by mouth nightly 8/31/20     GILMA Bess NP adalimumab (HUMIRA) 40 MG/0.8ML injection Inject 40 mg into the skin every 14 days Indications: Psoriasis       Historical Provider, MD   metoprolol tartrate (LOPRESSOR) 25 MG tablet 25 mg nightly  4/1/19     Historical Provider, MD   atorvastatin (LIPITOR) 80 MG tablet Take by mouth nightly  3/28/19     Historical Provider, MD   BRILINTA 90 MG TABS tablet Take 90 mg by mouth nightly  4/1/19     Historical Provider, MD   ondansetron (ZOFRAN-ODT) 4 MG disintegrating tablet Take 4 mg by mouth every 6 hours as needed for Nausea or Vomiting       Historical Provider, MD   aspirin 81 MG tablet Take 81 mg by mouth daily       Historical Provider, MD         Social History   Social History            Socioeconomic History    Marital status:        Spouse name: None    Number of children: 2    Years of education: assoc degree    Highest education level: None   Occupational History    None   Social Needs    Financial resource strain: Not hard at all   [employer] insecurity       Worry: Never true       Inability: Never true    Transportation needs       Medical: No       Non-medical: No   Tobacco Use    Smoking status: Current Every Day Smoker       Packs/day: 1.00       Start date: 56    Smokeless tobacco: Never Used    Tobacco comment: 6/5/19 pt given in on smoking and smoking cessation. Substance and Sexual Activity    Alcohol use: Yes       Alcohol/week: 2.0 standard drinks       Types: 1 Glasses of wine, 1 Shots of liquor per week       Frequency: Monthly or less       Drinks per session: 1 or 2       Comment: \"I used to drink a lot. I drink 2 to 3 drinks a month now\".  Drug use:  Yes       Types: Marijuana       Comment: uses 9/4/20 marijuana every day     Sexual activity: Not Currently       Comment: 9/4/20 no birth control, not sexually active   Lifestyle    Physical activity       Days per week: None       Minutes per session: None    Stress: None   Relationships    Social connections     VLDL  No results found for: CHOLHDLRATIO  No results found for: LABA1C  No results found for: EAG  No results found for: TSHFT4, TSH  No results found for: VITD25     Assessments Administered:     PHQ9: 12, moderate   GAD7:  11, mild       Assessment:    1. Moderate episode of recurrent major depressive disorder ([facility] [address]    2. CANDE (generalized anxiety disorder)    3. Insomnia due to other mental disorder    4. Poor concentration          Plan:  Continue:  Effexor XR, 225mg, daily  Buspirone, 30mg, twice daily  Amitriptyline, 25mg, nightly  Clonidine, 0.1mg, nightly (for focus, and anxiety)  Melatonin, 3mg up to 10mg, nightly as needed for sleep initiation (hasn't used it for the last couple of weeks and hasn't needed it)     Follow up: Return in about 4 months (around 4/10/2021).     1. The risks, benefits, side effects, indications, contraindications, and adverse effects of the medications have been discussed. Yes.  2. The pt has verbalized understanding and has capacity to give informed consent. 3. The Charlie Bean report has been reviewed according to [facility] regulations. 4. Supportive therapy offered. 5. Follow up:    Return in about 4 months (around 4/10/2021). 6. The patient has been advised to call with any problems. 7. Controlled substance Treatment Plan: none. 8. The above listed medications have been continued, modifications in meds and other orders/labs as follows:                   Encounter Medications    No orders of the defined types were placed in this encounter.                  No orders of the defined types were placed in this encounter.        9. Additional comments: She continues to have custody issues with her  and her 's fiance. This is stressful. She continues to be in therapy with Sarah Boyd. She continues to benefit from this. Custody arrangements were just finalized on 11/4/20.  Her scores are up a little from last visit, probably situational. She denies being suicidal. Will make no medication changes today and will follow up in about 4 months. She will call if she needs to be seen sooner.        10. Over 50% of the total visit time of 25  minutes was spent on counseling and/or coordination of care of:                         1. Moderate episode of recurrent major depressive disorder ([facility] [address]    2. CANDE (generalized anxiety disorder)    3. Insomnia due to other mental disorder    4. Poor concentration                        Psychotherapy Topics: mood/medication effectiveness         Avelina Romero PMHNP-BC      Instructions       Return in about 4 months (around 4/10/2021). Plan:  Continue:  Effexor XR, 225mg, daily  Buspirone, 30mg, twice daily  Amitriptyline, 25mg, nightly  Clonidine, 0.1mg, nightly (for focus, and anxiety)  Melatonin, 3mg up to 10mg, nightly as needed for sleep initiation (hasn't used it for the last couple of weeks and hasn't needed it)     Follow up: Return in about 4 months (around 4/10/2021).    If you become suicidal, follow up with this office or call the [facility] at [phone]), or dial [phone]. After Visit Summary (Printed 12/11/2020)  Additional Documentation    Flowsheets:    Patient Health Questionnaire - 9      Encounter Info:    Billing Info,   Allergies,   Detailed Report,   History,   Medications,   Questionnaires      New Media    Scan on 12/11/2020 11:23 AM by Marlo Haynes MA: [email or web address] on 12/11/2020 11:23 AM by Marlo Haynes MA: CANDE-7   Chart Review Routing History    No routing history on file.   Encounter Status    Signed by IGLMA Stein NP on 12/10/20 at 14:31   BestPractice Advisories    Click to view BestPractice Advisory history   Encounter Messages    No messages in this encounter   Orders Placed     None  Outpatient Medications at End of Encounter as of 12/10/2020    venlafaxine (EFFEXOR XR) 75 MG extended release capsule TAKE 1 CAPSULE BY MOUTH ONCE DAILY (WITH 150MG TO MAKE 225MG DOSE)   venlafaxine (EFFEXOR XR) 150 MG extended release capsule Take 1 capsule by mouth daily   cloNIDine (CATAPRES) 0.1 MG tablet Take 1 tablet by mouth nightly   busPIRone (BUSPAR) 30 MG tablet Take 30 mg by mouth 2 times daily   amitriptyline (ELAVIL) 25 MG tablet Take 1 tablet by mouth nightly   adalimumab (HUMIRA) 40 MG/0.8ML injection Inject 40 mg into the skin every 14 days Indications: Psoriasis   metoprolol tartrate (LOPRESSOR) 25 MG tablet 25 mg nightly    atorvastatin (LIPITOR) 80 MG tablet Take by mouth nightly    BRILINTA 90 MG TABS tablet Take 90 mg by mouth nightly    ondansetron (ZOFRAN-ODT) 4 MG disintegrating tablet Take 4 mg by mouth every 6 hours as needed for Nausea or Vomiting   aspirin 81 MG tablet Take 81 mg by mouth daily   Visit Diagnoses       Moderate episode of recurrent major depressive disorder (HCC)     CANDE (generalized anxiety disorder)     Insomnia due to other mental disorder     Poor concentration     Problem List

## 2021-04-12 ENCOUNTER — TELEPHONE (OUTPATIENT)
Dept: PSYCHIATRY | Age: 41
End: 2021-04-12

## 2021-04-12 ENCOUNTER — VIRTUAL VISIT (OUTPATIENT)
Dept: PSYCHIATRY | Age: 41
End: 2021-04-12
Payer: COMMERCIAL

## 2021-04-12 DIAGNOSIS — R41.840 POOR CONCENTRATION: ICD-10-CM

## 2021-04-12 DIAGNOSIS — F99 INSOMNIA DUE TO OTHER MENTAL DISORDER: ICD-10-CM

## 2021-04-12 DIAGNOSIS — F51.05 INSOMNIA DUE TO OTHER MENTAL DISORDER: ICD-10-CM

## 2021-04-12 DIAGNOSIS — F33.0 MILD EPISODE OF RECURRENT MAJOR DEPRESSIVE DISORDER (HCC): Primary | ICD-10-CM

## 2021-04-12 DIAGNOSIS — F41.1 GAD (GENERALIZED ANXIETY DISORDER): ICD-10-CM

## 2021-04-12 DIAGNOSIS — F12.10 CANNABIS USE DISORDER, MILD, ABUSE: ICD-10-CM

## 2021-04-12 PROCEDURE — 99442 PR PHYS/QHP TELEPHONE EVALUATION 11-20 MIN: CPT | Performed by: NURSE PRACTITIONER

## 2021-04-12 RX ORDER — BUSPIRONE HYDROCHLORIDE 30 MG/1
30 TABLET ORAL 2 TIMES DAILY
Qty: 180 TABLET | Refills: 0 | Status: SHIPPED | OUTPATIENT
Start: 2021-04-12 | End: 2021-07-26

## 2021-04-12 ASSESSMENT — PATIENT HEALTH QUESTIONNAIRE - PHQ9
5. POOR APPETITE OR OVEREATING: 1
7. TROUBLE CONCENTRATING ON THINGS, SUCH AS READING THE NEWSPAPER OR WATCHING TELEVISION: 1
SUM OF ALL RESPONSES TO PHQ QUESTIONS 1-9: 9
3. TROUBLE FALLING OR STAYING ASLEEP: 2
1. LITTLE INTEREST OR PLEASURE IN DOING THINGS: 1
2. FEELING DOWN, DEPRESSED OR HOPELESS: 1
SUM OF ALL RESPONSES TO PHQ QUESTIONS 1-9: 9

## 2021-04-12 ASSESSMENT — ANXIETY QUESTIONNAIRES
6. BECOMING EASILY ANNOYED OR IRRITABLE: 1-SEVERAL DAYS
GAD7 TOTAL SCORE: 8
2. NOT BEING ABLE TO STOP OR CONTROL WORRYING: 2-OVER HALF THE DAYS
3. WORRYING TOO MUCH ABOUT DIFFERENT THINGS: 2-OVER HALF THE DAYS
1. FEELING NERVOUS, ANXIOUS, OR ON EDGE: 2-OVER HALF THE DAYS

## 2021-04-12 NOTE — TELEPHONE ENCOUNTER
Called pt to get checked in for appt.     Electronically signed by Karol Patterson MA on 4/12/2021 at 1:18 PM

## 2021-04-12 NOTE — PROGRESS NOTES
Arsenio Gaviria is a 36 y.o. female evaluated via telephone on 4/12/2021. Consent:  She and/or health care decision maker is aware that that she may receive a bill for this telephone service, depending on her insurance coverage, and has provided verbal consent to proceed: Yes      Documentation:  I communicated with the patient and/or health care decision maker about anxiety/depression. Details of this discussion including any medical advice provided: see below      I affirm this is a Patient Initiated Episode with a Patient who has not had a related appointment within my department in the past 7 days or scheduled within the next 24 hours. Patient identification was verified at the start of the visit: Yes    Total Time: minutes: 11-20 minutes    Note: not billable if this call serves to triage the patient into an appointment for the relevant concern      Carlus Barthel       4/12/2021 1:32 PM   Progress Note    IN:  0  OUT: [address] [facility] 1980      Chief Complaint   Patient presents with    Medication Check    Follow-up    Depression         Subjective:  Patient is a 36 y.o. female diagnosed with MDD and presents today for follow-up. Last seen in clinic on 12/10/20 and prior records were reviewed. Today patient states, \"I'm doing ok. \" She reports that she has no concerns with her medication and that it is working. She reports that custody arrangements have been determined. Patient reports side effects as follows: none. No evidence of EPS, no cogwheeling or abnormal motor movements. Absent  suicidal ideation. Reports compliance with medications as good . Current Substance Use:  See history    BP: There were no vitals taken for this visit.       Review of Systems - 14 point review:  Negative except being treated for: psoriasis, hx of MI, HTN, anxiety, depression, insomnia, cannabis dependence      Constitutional: (fevers, chills, night sweats, wt loss/gain, change in appetite, fatigue, somnolence)    HEENT: (ear pain or discharge, hearing loss, ear ringing, sinus pressure, nosebleed, nasal discharge, sore throat, oral sores, tooth pain, bleeding gums, hoarse voice, neck pain)      Cardiovascular: (HTN, chest pain, elevated cholesterol/lipids, palpitations, leg swelling, leg pain with walking)    Respiratory: (cough, wheezing, snoring, SOB with activity (dyspnea), SOB while lying flat (orthopnea), awakening with severe SOB (paroxysmal nocturnal dyspnea))    Gastrointestinal: (NVD, constipation, abdominal pain, bright red stools, black tarry stools, stool incontinence)     Genitourinary:  (pelvic pain, burning or frequency of urination, urinary urgency, blood in urine incomplete bladder emptying, urinary incontinence, STD; MEN: testicular pain or swelling, erectile dysfunction; WOMEN: LMP, heavy menstrual bleeding (menorrhagia), irregular periods, postmenopausal bleeding, menstrual pain (dymenorrhea, vaginal discharge)    Musculoskeletal: (bone pain/fracture, joint pain or swelling, musle pain)    Integumentary: (rashes, acne, non-healing sores, itching, breast lumps, breast pain, nipple discharge, hair loss)    Neurologic: (HA, muscle weakness, paresthesias (numbness, coldness, crawling or prickling), memory loss, seizure, dizziness)    Psychiatric:  (anxiety, sadness, irritability/anger, insomnia, suicidality)    Endocrine: (heat or cold intolerance, excessive thirst (polydipsia), excessive hunger (polyphagia))    Immune/Allergic: (hives, seasonal or environmental allergies, HIV exposure)    Hematologic/Lymphatic: (lymph node enlargement, easy bleeding or bruising)    History obtained via chart review and patient    PCP is Debbie Trejo, DO       Current Meds:    Prior to Admission medications    Medication Sig Start Date End Date Taking?  Authorizing Provider   busPIRone (BUSPAR) 30 MG tablet Take 30 mg by mouth 2 times daily 4/12/21  Yes GILMA Berry NP   [facility] 75 MG extended release capsule TAKE 1 CAPSULE BY MOUTH ONCE DAILY (WITH  150  MG  TO  MAKE  225  MG  DOSE) 3/16/21   Suyapa Glow, APRN - NP   venlafaxine (EFFEXOR XR) 150 MG extended release capsule Take 1 capsule by mouth once daily 3/16/21   Suyapa Glow, APRN - NP   amitriptyline (ELAVIL) 25 MG tablet Take 1 tablet by mouth nightly 3/16/21   Mystic Glow, APRN - NP   cloNIDine (CATAPRES) 0.1 MG tablet Take 1 tablet by mouth nightly 9/4/20   Suyapa Glow, APRN - NP   adalimumab (HUMIRA) 40 MG/0.8ML injection Inject 40 mg into the skin every 14 days Indications: Psoriasis    Historical Provider, MD   metoprolol tartrate (LOPRESSOR) 25 MG tablet 25 mg nightly  4/1/19   Historical Provider, MD   atorvastatin (LIPITOR) 80 MG tablet Take by mouth nightly  3/28/19   Historical Provider, MD   BRILINTA 90 MG TABS tablet Take 90 mg by mouth nightly  4/1/19   Historical Provider, MD   ondansetron (ZOFRAN-ODT) 4 MG disintegrating tablet Take 4 mg by mouth every 6 hours as needed for Nausea or Vomiting    Historical Provider, MD   aspirin 81 MG tablet Take 81 mg by mouth daily    Historical Provider, MD     Social History     Socioeconomic History    Marital status:      Spouse name: None    Number of children: 2    Years of education: assoc degree    Highest education level: None   Occupational History    None   Social Needs    Financial resource strain: Not hard at all   Krishna-Michelle insecurity     Worry: Never true     Inability: Never true    Transportation needs     Medical: No     Non-medical: No   Tobacco Use    Smoking status: Current Every Day Smoker     Packs/day: 1.00     Start date: 56    Smokeless tobacco: Never Used    Tobacco comment: 6/5/19 pt given in on smoking and smoking cessation. Substance and Sexual Activity    Alcohol use:  Yes     Alcohol/week: 2.0 standard drinks     Types: 1 Glasses of wine, 1 Shots of liquor per week     Frequency: Monthly or less     Drinks per session: 1 or 2     Comment: \"I used to drink a lot. I drink 2 to 3 drinks a month now\".  Drug use: Yes     Types: Marijuana     Comment: uses 9/4/20 marijuana every day     Sexual activity: Not Currently     Comment: 9/4/20 no birth control, not sexually active   Lifestyle    Physical activity     Days per week: None     Minutes per session: None    Stress: None   Relationships    Social connections     Talks on phone: None     Gets together: None     Attends [employer] service: None     Active member of club or organization: None     Attends meetings of clubs or organizations: None     Relationship status: None    Intimate partner violence     Fear of current or ex partner: None     Emotionally abused: None     Physically abused: None     Forced sexual activity: None   Other Topics Concern    None   Social History Narrative    PREVIOUS MEDICATION TRIALS    Ambien    Effexor XR (current, 150mg)    Prozac (several years, 40mg, doesn't remember why she stopped)    Elavil    Zoloft (several years, doesn't remember the dose, stopped taking because she was pregnant)    Xanax         No negative effect with taking SSRI's       MSE:  Patient is  A & O x 4. Appearance:  JOY. Cognition:  Recent memory intact , remote memory intact , good fund of knowledge, average  intelligence level. Speech:  normal  Language: Naming: intact;  Word Finding: intact  Conversation no evidence of delusions  Behavior:  Cooperative  Mood: \"focused\"   Affect: congruent with mood  Thought Content: negative delusions, negative hallucinations, negative obsessions,  negativehomicidal and negative suicidal   Thought Process: linear, goal directed and coherent  Associations: logical connections  Attention Span and concentration: Normal   Judgement Insight:  normal and appropriate  Gait and Station:JOY   Sleep: avg 6-7 hrs (is using an glenroy called [employer])  Appetite: ok      No results found for: NA, K, CL, CO2, BUN, CREATININE, GLUCOSE, CALCIUM, PROT, LABALBU, BILITOT, ALKPHOS, AST, ALT, LABGLOM, GFRAA, AGRATIO, GLOB  No results found for: NA, K, CL, CO2, BUN, CREATININE, GLUCOSE, CALCIUM   No results found for: CHOL  No results found for: TRIG  No results found for: HDL  No results found for: LDLCHOLESTEROL, LDLCALC  No results found for: LABVLDL, VLDL  No results found for: CHOLHDLRATIO  No results found for: LABA1C  No results found for: EAG  No results found for: TSHFT4, TSH  No results found for: VITD25    Assessments Administered:    PHQ9: 9, mild   GAD7: 8, mild      Assessment:   1. Mild episode of recurrent major depressive disorder ([facility] [address]    2. CANDE (generalized anxiety disorder)    3. Insomnia due to other mental disorder    4. Poor concentration    5. Cannabis use disorder, mild, abuse        Plan:  Continue:  Effexor XR, 225mg, daily  Buspirone, 30mg, twice daily  Amitriptyline, 25mg, nightly  Clonidine, 0.1mg, nightly (for focus, and anxiety)  Melatonin, 3mg up to 10mg, nightly as needed for sleep initiation (hasn't used it for the last couple of weeks and hasn't needed it)    Continue therapy with GALI Gonzalez    Follow up: Return in about 4 months (around 8/12/2021). If you become suicidal, follow up with this office or call the [facility] at [phone]), or dial [phone].    1. The risks, benefits, side effects, indications, contraindications, and adverse effects of the medications have been discussed. Yes.  2. The pt has verbalized understanding and has capacity to give informed consent. 3. The [place] [facility] report has been reviewed according to [facility] regulations. 4. Supportive therapy offered. 5. Follow up: Return in about 4 months (around 8/12/2021). 6. The patient has been advised to call with any problems. 7. Controlled substance Treatment Plan: none.   8. The above listed medications have been continued, modifications in meds and other orders/labs as follows:      Orders Placed This Encounter   Medications    busPIRone (BUSPAR) 30 MG tablet     Sig: Take 30 mg by mouth 2 times daily     Dispense:  180 tablet     Refill:  0     Fill when due. No orders of the defined types were placed in this encounter. 9. Additional comments: She continues to do well on these medications. Will make no changes today and will follow up in about 4 months. ... 10.Over 50% of the total visit time of 20 minutes was spent on counseling and/or coordination of care of:                        1. Mild episode of recurrent major depressive disorder ([facility] [address]    2. CANDE (generalized anxiety disorder)    3. Insomnia due to other mental disorder    4. Poor concentration    5.  Cannabis use disorder, mild, abuse                      Psychotherapy Topics: mood/medication effectiveness       Alto Nyhan PMHNP-BC

## 2021-04-12 NOTE — PATIENT INSTRUCTIONS
Plan:  Continue:  Effexor XR, 225mg, daily  Buspirone, 30mg, twice daily  Amitriptyline, 25mg, nightly  Clonidine, 0.1mg, nightly (for focus, and anxiety)  Melatonin, 3mg up to 10mg, nightly as needed for sleep initiation (hasn't used it for the last couple of weeks and hasn't needed it)    Continue therapy with GALI Gonzalez    Follow up: Return in about 4 months (around 8/12/2021). If you become suicidal, follow up with this office or call the [facility] at [phone]), or dial [phone].

## 2021-04-14 ENCOUNTER — VIRTUAL VISIT (OUTPATIENT)
Dept: PSYCHIATRY | Age: 41
End: 2021-04-14
Payer: COMMERCIAL

## 2021-04-14 DIAGNOSIS — F33.0 MILD EPISODE OF RECURRENT MAJOR DEPRESSIVE DISORDER (HCC): Primary | ICD-10-CM

## 2021-04-14 PROCEDURE — 98968 PH1 ASSMT&MGMT NQHP 21-30: CPT | Performed by: SOCIAL WORKER

## 2021-04-14 NOTE — PATIENT INSTRUCTIONS
work desk. To keep your bedroom clean, make a habit of these steps:   Make your bed    Fold blankets and throws    Put away clutter in appropriate locations    Dust desks, nightstands and shelves    Vacuum floor and area rugs  Kitchen  Dishes tend to be the culprit in a messy kitchen. Have family members who like to let dishes soak?  Simply prepare one side of your sink with water and few drops of dish detergent. Throughout the day, add dishes to the soapy side and it will lift most of the grease and food off of the dishes. By the time you wash the dishes or put them in the , theyll be clean. The kitchen is a great place to apply the 20-minutes-per-day rule. Spend a few extra minutes cleaning your kitchen after each meal, and youll never have to deal with a huge mess in one of the most important rooms in your home. Focus on these areas when it comes to cleaning your kitchen:   Put away dishesalways have an empty sink!  Clean countertops   [employer] your pantry and refrigerator    Sweep and mop the floor    Use steel  for appliances  Living Room  The living room receives some of the most traffic in the house. If youre not careful, it can easily become littered with an assortment of everyday items. Make sure you place your items where they belong so your living room can leave a great first impression on your family and friends. Simple steps to regularly take for a clean living room:   Clear the room of any clutter (toys, games, books)    Fluff pillows and fold blankets/throws    Dust mantel, coffee tables and end tables    Vacuum floors and couches (especially if you have pets!)  Storage solutions like these will keep this area of your house clean:  Shoe rack. If your living room is carpeted, the constant foot traffic can wear your carpet down significantly.  Make sure you have a place for your family and guests to store their shoes before they tromp dirt and grass through your clean home. Hidden storage. If you dont have a home for items like toys, books and games, vertical shelving can help display your things in an organized way. Storage ottomans are also perfect for minimizing clutter and storing things out of sight. How to Keep a [employer] Tip #3: Stick to a schedule. Its one thing to give each room a thorough clean, but how do you make sure your house stays clutter-free every day of the week? Believe it or not, easily: a cleaning schedule. Create a list to keep on your fridge, your wall or your desk to help keep you and your household organized. Your schedule should include not just what needs to be done, but when it needs to be done. By splitting your small household tasks into daily, weekly and even monthly routines, you can keep your house in perfect shape year round. How to Keep a [employer] Daily  Most people wait until their house is messy to start cleaning. The trick is to put in a small effort every day to keep your house as tidy as possible. These daily tasks are small but effective reminders of how to keep a house clean. Six easy ways to go to sleep with a tidy home each night:  Make the bed. The best way to start your day is by making your bed. Simply making your bed each day will have a sejal effect, allowing you to keep everything else neat and tidy. Clean as you cook. As you learn how to keep a house clean, pay close attention to clutter in the kitchen. Throw out scraps and empty packages as you use them. Wash pots and utensils while dinner roasts in the oven. Make sure dishes are put away before sitting down after meals. Cleaning as you go saves time and keeps your kitchen in tip-top shape. Grab as you go. Make it a mission to minimize clutter by picking up your belongings whenever you leave a room. Bring a pair of shoes with you when you go upstairs,  the coffee cup on the counter and take dirty laundry with you on your trip downstairs.    Wipe up messes as they happen. Try not to leave any spills or small messes unattended. Take a few minutes to wipe them up with a damp cloth so youre not dealing with set-in stains at the end of the week. Sort the mail. We receive mail every day, and most of that mail turns out to be junk. Instead of letting it pile up in your mailbox or on the counter, sort it the second you walk in the door. Place bills, coupons and personal correspondence in their appropriate places the moment you bring in the mail and recycle junk mail. Sweep the kitchen floor. The kitchen often sees more traffic than the rest of the house, meaning the floor collects a lot of dirt and debris. Spend a few minutes each day sweeping the floor, and you wont see dirt being dragged through the house all week long. How to Keep a House Clean Weekly  Never underestimate the power of a weekly cleaning! No matter how successful you are with the small daily tasks, youll still have a few 20-minute tasks to complete once a week. While a weekly cleaning schedule isnt necessarily a one-size-fits-all solution, its an easy way to carve out one room at a time. As long as you follow a routine that permits you to focus on one major section of your home each day, youll never feel overwhelmed. Cleaning your home will practically take care of itself! Example weekly cleaning schedule:  Monday: Laundry and dusting  Tuesday: Bathrooms and vacuuming  Wednesday: Living room and mopping  Thursday: Bedrooms  Friday: Julita   Saturday: Organizational and miscellaneous tasks    Aside from consistent daily and weekly cleanings, you should also keep monthly and quarterly cleanings in mind. This could include mattresses, lint lines, air filters, blinds and more. By starting with your smaller tasks, you can fill in the gaps with these larger, less frequent tasks. How to Keep a House Clean Tip #4: Believe in a power clean.   Nobody likes to go to sleep knowing their house is a mess. By spending 10-15 minutes on a nightly power cleanup, you can prevent piles of clutter from forming in your home. If you can involve your family members, the more the merrier! Put on a timer, have some fun with it and focus on your top cleaning priorities. Focus on the items that pose the biggest mess (whether its your kitchen, kids bedroom or foyer) first. Then chip away at the areas that see the most traffic. Here are some zepeda areas to keep in mind:   Shoes in the entryway   [employer] in the sink    Items on the bathroom counter    Coffee table clutter    Toys on the living room floor  Think youve mastered how to keep a house clean? Figuring out how to keep a house clean may seem like one of lifes many mysteries. With a little time and effort, you can easily create great cleaning habits to keep your nest tidy year round. How do you keep your house clean? What are some of your most useful tips? Let us know in the comments below!

## 2021-04-14 NOTE — PROGRESS NOTES
Ammy Lomas is a 36 y.o. female evaluated via telephone on 4/14/2021. Consent:  She and/or health care decision maker is aware that that she may receive a bill for this telephone service, depending on her insurance coverage, and has provided verbal consent to proceed: Yes      Documentation:  I communicated with the patient and/or health care decision maker about see progress note. Details of this discussion including any medical advice provided: see progress note. I affirm this is a Patient Initiated Episode with a Patient who has not had a related appointment within my department in the past 7 days or scheduled within the next 24 hours. Patient identification was verified at the start of the visit: Yes    Total Time: minutes: 21-30 minutes    The visit was conducted pursuant to the emergency declaration under the [address] [place] [address], [address] [place] [address] authority and the [employer] General Act. Patient identification was verified, and a caregiver was present when appropriate. The patient was located in a state where the provider was credentialed to provide care. Note: not billable if this call serves to triage the patient into an appointment for the relevant concern      Raciel Malhotra     Therapy Progress Note  Raciel Malhotra MSW, LCSW  4/14/2021  10:00 AM  10:50 AM    Patient location  : Home  [facility] location : [address] [place] [address]      Time spent with Patient: 50 minutes  This is patient's 16th  Therapy appointment. Reason for Consult:  depression and anxiety  Referring Provider: No referring provider defined for this encounter. Ammy Lomas ,a 36 y.o. female, for initial evaluation visit.      Pt provided informed consent for the behavioral health program. Discussed with patient model of service to include the limits of confidentiality (i.e. abuse reporting, suicide intervention, etc.) and short-term intervention focused approach. Discussed no show and late cancellation policy. Pt indicated understanding. S:  Pt reported about tooth issues and anxiety about it being related to her mother's death. Pt reported her daughter has signed a contract with daughter's father on the car which was unfair, however daughter chose to sign anyway. Discussed progress, increasing self care, and having a weekend schedule to help accomplish desires on day off from work. Pt reported she would like to continue the 6 weeks appointments and verbalized she would call sooner if needed. Pt denies Suicidal Ideations, Homicidal Ideation, Auditory Hallucinations, Visual Hallucinations, Tactical Hallucinations. MSE:    Sounded    alert, cooperative, mild distress  Appetite normal  Sleep disturbance No  Fatigue No  Loss of pleasure No  Impulsive behavior No  Speech    normal rate and normal volume  Mood    Anxious    Thought Content    cognitive distortions and all or nothing thinking  Thought Process    circumstantial  Associations    logical connections  Insight    Fair  Judgment    Intact  Orientation    oriented to person, place, time, and general circumstances  Memory    recent and remote memory intact  Attention/Concentration    intact  Morbid ideation No  Suicide Assessment    no suicidal ideation      History:  Social History     Socioeconomic History    Marital status:      Spouse name: Not on file    Number of children: 2    Years of education: assoc degree    Highest education level: Not on file   Occupational History    Not on file   Social Needs    Financial resource strain: Not hard at all   [employer] insecurity     Worry: Never true     Inability: Never true   [employer] needs     Medical: No     Non-medical: No   Tobacco Use    Smoking status: Current Every Day Smoker     Packs/day: 1.00     Start date: 56    Smokeless tobacco: Never Used    Tobacco comment: 6/5/19 pt given in on smoking and smoking cessation. Substance and Sexual Activity    Alcohol use: Yes     Alcohol/week: 2.0 standard drinks     Types: 1 Glasses of wine, 1 Shots of liquor per week     Frequency: Monthly or less     Drinks per session: 1 or 2     Comment: \"I used to drink a lot. I drink 2 to 3 drinks a month now\".     Drug use: Yes     Types: Marijuana     Comment: uses 9/4/20 marijuana every day     Sexual activity: Not Currently     Comment: 9/4/20 no birth control, not sexually active   Lifestyle    Physical activity     Days per week: Not on file     Minutes per session: Not on file    Stress: Not on file   Relationships    Social connections     Talks on phone: Not on file     Gets together: Not on file     Attends Congregation service: Not on file     Active member of club or organization: Not on file     Attends meetings of clubs or organizations: Not on file     Relationship status: Not on file    Intimate partner violence     Fear of current or ex partner: Not on file     Emotionally abused: Not on file     Physically abused: Not on file     Forced sexual activity: Not on file   Other Topics Concern    Not on file   Social History Narrative    PREVIOUS MEDICATION TRIALS    Ambien    Effexor XR (current, 150mg)    Prozac (several years, 40mg, doesn't remember why she stopped)    Elavil    Zoloft (several years, doesn't remember the dose, stopped taking because she was pregnant)    Xanax         No negative effect with taking SSRI's       Medications:   Current Outpatient Medications   Medication Sig Dispense Refill    busPIRone (BUSPAR) 30 MG tablet Take 30 mg by mouth 2 times daily 180 tablet 0    venlafaxine (EFFEXOR XR) 75 MG extended release capsule TAKE 1 CAPSULE BY MOUTH ONCE DAILY (WITH  150  MG  TO  MAKE  225  MG  DOSE) 90 capsule 0    venlafaxine (EFFEXOR XR) 150 MG extended release capsule Take 1 capsule by mouth once daily 90 capsule 0    amitriptyline (ELAVIL) 25 MG tablet Take 1 tablet by mouth nightly 90 tablet 0    cloNIDine (CATAPRES) 0.1 MG tablet Take 1 tablet by mouth nightly 90 tablet 1    adalimumab (HUMIRA) 40 MG/0.8ML injection Inject 40 mg into the skin every 14 days Indications: Psoriasis      metoprolol tartrate (LOPRESSOR) 25 MG tablet 25 mg nightly   11    atorvastatin (LIPITOR) 80 MG tablet Take by mouth nightly   11    BRILINTA 90 MG TABS tablet Take 90 mg by mouth nightly   11    ondansetron (ZOFRAN-ODT) 4 MG disintegrating tablet Take 4 mg by mouth every 6 hours as needed for Nausea or Vomiting      aspirin 81 MG tablet Take 81 mg by mouth daily       No current facility-administered medications for this visit. Social History:   Social History     Socioeconomic History    Marital status:      Spouse name: Not on file    Number of children: 2    Years of education: assoc degree    Highest education level: Not on file   Occupational History    Not on file   Social Needs    Financial resource strain: Not hard at all   [employer] insecurity     Worry: Never true     Inability: Never true   [employer] needs     Medical: No     Non-medical: No   Tobacco Use    Smoking status: Current Every Day Smoker     Packs/day: 1.00     Start date: 56    Smokeless tobacco: Never Used    Tobacco comment: 6/5/19 pt given in on smoking and smoking cessation. Substance and Sexual Activity    Alcohol use: Yes     Alcohol/week: 2.0 standard drinks     Types: 1 Glasses of wine, 1 Shots of liquor per week     Frequency: Monthly or less     Drinks per session: 1 or 2     Comment: \"I used to drink a lot. I drink 2 to 3 drinks a month now\".     Drug use: Yes     Types: Marijuana     Comment: uses 9/4/20 marijuana every day     Sexual activity: Not Currently     Comment: 9/4/20 no birth control, not sexually active   Lifestyle    Physical activity     Days per week: Not on file     Minutes per session: Not on file    Stress: Not on file   Relationships    Social connections     Talks on phone: Not on file     Gets together: Not on file     Attends [employer] service: Not on file     Active member of club or organization: Not on file     Attends meetings of clubs or organizations: Not on file     Relationship status: Not on file    Intimate partner violence     Fear of current or ex partner: Not on file     Emotionally abused: Not on file     Physically abused: Not on file     Forced sexual activity: Not on file   Other Topics Concern    Not on file   Social History Narrative    PREVIOUS MEDICATION TRIALS    Ambien    Effexor XR (current, 150mg)    Prozac (several years, 40mg, doesn't remember why she stopped)    Elavil    Zoloft (several years, doesn't remember the dose, stopped taking because she was pregnant)    Xanax         No negative effect with taking SSRI's       TOBACCO:   reports that she has been smoking. She started smoking about 25 years ago. She has been smoking about 1.00 pack per day. She has never used smokeless tobacco.  ETOH:   reports current alcohol use of about 2.0 standard drinks of alcohol per week. Family History:   Family History   Problem Relation Age of Onset    High Blood Pressure Mother     Depression Mother     Anxiety Disorder Mother     Heart Attack Father     Heart Disease Father     High Cholesterol Father        Diagnosis:    The encounter diagnosis was Mild episode of recurrent major depressive disorder ([facility] [address] Diagnosis Date    CAD (coronary artery disease)     MI about 1 1/2 yrs ago    CHF (congestive heart failure) ([facility] [address]     Hyperlipidemia     Hypertension     kidney stones     VINOD (obstructive sleep apnea)     uses CPAP    Psoriasis     Psychiatric problem     hx of depression and anxiety since a teenager. Problems with primary support group    Plan:  1. Continue medication management  2. CBT to target cognitive distortions  3. Discuss therapeutic goals  A.  Setting boundaries   B. Assertive Communications    Pt interventions:  Trained in strategies for increasing balanced thinking, Provided education, Discussed self-care (sleep, nutrition, rewarding activities, social support, exercise), [place]-setting to identify pt's primary goals for [facility] visit / overall health, Supportive techniques, Emphasized self-care as important for managing overall health and Identified maladaptive thoughts      Anice Chance MSW, LCSW

## 2021-04-19 RX ORDER — CLONIDINE HYDROCHLORIDE 0.1 MG/1
0.1 TABLET ORAL NIGHTLY
Qty: 90 TABLET | Refills: 0 | Status: SHIPPED | OUTPATIENT
Start: 2021-04-19 | End: 2021-08-12 | Stop reason: SDUPTHER

## 2021-04-19 NOTE — TELEPHONE ENCOUNTER
Pharmacy sent med refill request below. Last office visit : 4/12/2021 with Jina DILLARD  Next office visit : 8/12/2021 with Jina DILLARD    Requested Prescriptions     Pending Prescriptions Disp Refills    cloNIDine (CATAPRES) 0.1 MG tablet [Pharmacy Med Name: cloNIDine HCl 0.1 MG Oral Tablet] 90 tablet 0     Sig: Take 1 tablet by mouth nightly            Cecily Vu RN          4/12/2021 1:32 PM                             Progress Note     IN:  1310  OUT: 1330        Aysha Dowling 1980                                 Chief Complaint   Patient presents with    Medication Check    Follow-up    Depression            Subjective:  Patient is a 36 y.o. female diagnosed with MDD and presents today for follow-up. Last seen in clinic on 12/10/20 and prior records were reviewed.     Today patient states, \"I'm doing ok. \" She reports that she has no concerns with her medication and that it is working. She reports that custody arrangements have been determined.       Patient reports side effects as follows: none. No evidence of EPS, no cogwheeling or abnormal motor movements.     Absent  suicidal ideation.   Reports compliance with medications as good .      Current Substance Use:  See history     BP: There were no vitals taken for this visit.        Review of Systems - 14 point review:  Negative except being treated for: psoriasis, hx of MI, HTN, anxiety, depression, insomnia, cannabis dependence        Constitutional: (fevers, chills, night sweats, wt loss/gain, change in appetite, fatigue, somnolence)     HEENT: (ear pain or discharge, hearing loss, ear ringing, sinus pressure, nosebleed, nasal discharge, sore throat, oral sores, tooth pain, bleeding gums, hoarse voice, neck pain)      Cardiovascular: (HTN, chest pain, elevated cholesterol/lipids, palpitations, leg swelling, leg pain with walking)     Respiratory: (cough, wheezing, snoring, SOB with activity (dyspnea), SOB while lying flat (orthopnea), awakening with severe SOB (paroxysmal nocturnal dyspnea))     Gastrointestinal: (NVD, constipation, abdominal pain, bright red stools, black tarry stools, stool incontinence)     Genitourinary:  (pelvic pain, burning or frequency of urination, urinary urgency, blood in urine incomplete bladder emptying, urinary incontinence, STD; MEN: testicular pain or swelling, erectile dysfunction; WOMEN: LMP, heavy menstrual bleeding (menorrhagia), irregular periods, postmenopausal bleeding, menstrual pain (dymenorrhea, vaginal discharge)     Musculoskeletal: (bone pain/fracture, joint pain or swelling, musle pain)     Integumentary: (rashes, acne, non-healing sores, itching, breast lumps, breast pain, nipple discharge, hair loss)     Neurologic: (HA, muscle weakness, paresthesias (numbness, coldness, crawling or prickling), memory loss, seizure, dizziness)     Psychiatric:  (anxiety, sadness, irritability/anger, insomnia, suicidality)     Endocrine: (heat or cold intolerance, excessive thirst (polydipsia), excessive hunger (polyphagia))     Immune/Allergic: (hives, seasonal or environmental allergies, HIV exposure)     Hematologic/Lymphatic: (lymph node enlargement, easy bleeding or bruising)     History obtained via chart review and patient     PCP is Maria Luisa Hickman, DO         Current Meds:     Home Medications           Prior to Admission medications    Medication Sig Start Date End Date Taking?  Authorizing Provider   busPIRone (BUSPAR) 30 MG tablet Take 30 mg by mouth 2 times daily 4/12/21   Yes Perla Garcia APRN - NP   venlafaxine (EFFEXOR XR) 75 MG extended release capsule TAKE 1 CAPSULE BY MOUTH ONCE DAILY (WITH  150  MG  TO  MAKE  225  MG  DOSE) 3/16/21     Perla Garcia, APRN - NP   venlafaxine (EFFEXOR XR) 150 MG extended release capsule Take 1 capsule by mouth once daily 3/16/21     Perla Jose APRN - NP   amitriptyline (ELAVIL) 25 MG tablet Take 1 tablet by mouth nightly 3/16/21     Perla Garcia APRN - NP cloNIDine (CATAPRES) 0.1 MG tablet Take 1 tablet by mouth nightly 9/4/20     Rob Greenberg APRN - NP   adalimumab (HUMIRA) 40 MG/0.8ML injection Inject 40 mg into the skin every 14 days Indications: Psoriasis       Historical Provider, MD   metoprolol tartrate (LOPRESSOR) 25 MG tablet 25 mg nightly  4/1/19     Historical Provider, MD   atorvastatin (LIPITOR) 80 MG tablet Take by mouth nightly  3/28/19     Historical Provider, MD   BRILINTA 90 MG TABS tablet Take 90 mg by mouth nightly  4/1/19     Historical Provider, MD   ondansetron (ZOFRAN-ODT) 4 MG disintegrating tablet Take 4 mg by mouth every 6 hours as needed for Nausea or Vomiting       Historical Provider, MD   aspirin 81 MG tablet Take 81 mg by mouth daily       Historical Provider, MD         Social History   Social History            Socioeconomic History    Marital status:        Spouse name: None    Number of children: 2    Years of education: assoc degree    Highest education level: None   Occupational History    None   Social Needs    Financial resource strain: Not hard at all   [employer] insecurity       Worry: Never true       Inability: Never true    Transportation needs       Medical: No       Non-medical: No   Tobacco Use    Smoking status: Current Every Day Smoker       Packs/day: 1.00       Start date: 56    Smokeless tobacco: Never Used    Tobacco comment: 6/5/19 pt given in on smoking and smoking cessation. Substance and Sexual Activity    Alcohol use: Yes       Alcohol/week: 2.0 standard drinks       Types: 1 Glasses of wine, 1 Shots of liquor per week       Frequency: Monthly or less       Drinks per session: 1 or 2       Comment: \"I used to drink a lot. I drink 2 to 3 drinks a month now\".  Drug use:  Yes       Types: Marijuana       Comment: uses 9/4/20 marijuana every day     Sexual activity: Not Currently       Comment: 9/4/20 no birth control, not sexually active   Lifestyle    Physical activity       Days per week: None       Minutes per session: None    Stress: None   Relationships    Social connections       Talks on phone: None       Gets together: None       Attends Pentecostalism service: None       Active member of club or organization: None       Attends meetings of clubs or organizations: None       Relationship status: None    Intimate partner violence       Fear of current or ex partner: None       Emotionally abused: None       Physically abused: None       Forced sexual activity: None   Other Topics Concern    None   Social History Narrative     PREVIOUS MEDICATION TRIALS     Ambien     Effexor XR (current, 150mg)     Prozac (several years, 40mg, doesn't remember why she stopped)     Elavil     Zoloft (several years, doesn't remember the dose, stopped taking because she was pregnant)     Xanax           No negative effect with taking SSRI's            MSE:  Patient is  A & O x 4. Appearance:  JOY. Cognition:  Recent memory intact , remote memory intact , good fund of knowledge, average  intelligence level. Speech:  normal  Language: Naming: intact;  Word Finding: intact  Conversation no evidence of delusions  Behavior:  Cooperative  Mood: \"focused\"   Affect: congruent with mood  Thought Content: negative delusions, negative hallucinations, negative obsessions,  negativehomicidal and negative suicidal   Thought Process: linear, goal directed and coherent  Associations: logical connections  Attention Span and concentration: Normal   Judgement Insight:  normal and appropriate  Gait and Station:JOY   Sleep: avg 6-7 hrs (is using an glenroy called [employer])  Appetite: ok        No results found for: NA, K, CL, CO2, BUN, CREATININE, GLUCOSE, CALCIUM, PROT, LABALBU, BILITOT, ALKPHOS, AST, ALT, LABGLOM, GFRAA, AGRATIO, GLOB  No results found for: NA, K, CL, CO2, BUN, CREATININE, GLUCOSE, CALCIUM   No results found for: CHOL  No results found for: TRIG  No results found for: HDL  No results found for: LDLCHOLESTEROL, [facility]  No results found for: LABVLDL, VLDL  No results found for: CHOLHDLRATIO  No results found for: LABA1C  No results found for: EAG  No results found for: TSHFT4, TSH  No results found for: VITD25     Assessments Administered:     PHQ9: 9, mild   GAD7: 8, mild       Assessment:    1. Mild episode of recurrent major depressive disorder ([address]    2. CANDE (generalized anxiety disorder)    3. Insomnia due to other mental disorder    4. Poor concentration    5. Cannabis use disorder, mild, abuse          Plan:  Continue:  Effexor XR, 225mg, daily  Buspirone, 30mg, twice daily  Amitriptyline, 25mg, nightly  Clonidine, 0.1mg, nightly (for focus, and anxiety)  Melatonin, 3mg up to 10mg, nightly as needed for sleep initiation (hasn't used it for the last couple of weeks and hasn't needed it)     Continue therapy with GALI Gonzalez     Follow up: Return in about 4 months (around 8/12/2021).    If you become suicidal, follow up with this office or call the [facility] at [phone]), or dial [phone].     1. The risks, benefits, side effects, indications, contraindications, and adverse effects of the medications have been discussed. Yes.  2. The pt has verbalized understanding and has capacity to give informed consent. 3. The [employer] report has been reviewed according to [facility] regulations. 4. Supportive therapy offered. 5. Follow up:    Return in about 4 months (around 8/12/2021). 6. The patient has been advised to call with any problems. 7. Controlled substance Treatment Plan: none.   8. The above listed medications have been continued, modifications in meds and other orders/labs as follows:                 Encounter Medications         Orders Placed This Encounter   Medications    busPIRone (BUSPAR) 30 MG tablet       Sig: Take 30 mg by mouth 2 times daily       Dispense:  180 tablet       Refill:  0       Fill when due.                     No orders of the defined types were placed in this encounter.        9. Additional comments: She continues to do well on these medications. Will make no changes today and will follow up in about 4 months. .. .     10. Over 50% of the total visit time of 20 minutes was spent on counseling and/or coordination of care of:                         1. Mild episode of recurrent major depressive disorder ([facility] [address]    2. CANDE (generalized anxiety disorder)    3. Insomnia due to other mental disorder    4. Poor concentration    5.  Cannabis use disorder, mild, abuse                        Psychotherapy Topics: mood/medication effectiveness         Nuria Schwarz PMHNP-BC

## 2021-04-20 ENCOUNTER — TELEPHONE (OUTPATIENT)
Dept: PSYCHIATRY | Age: 41
End: 2021-04-20

## 2021-04-20 NOTE — TELEPHONE ENCOUNTER
Pt made aware that a refill RX for Clonidine had been sent to her pharmacy per [facility].

## 2021-04-22 ENCOUNTER — TELEPHONE (OUTPATIENT)
Dept: PSYCHIATRY | Age: 41
End: 2021-04-22

## 2021-04-22 NOTE — TELEPHONE ENCOUNTER
Called pt to verify her address for what we had in the computer was wrong. Changed to the correct address.     Electronically signed by Parker Valentine MA on 4/22/2021 at 11:29 AM

## 2021-05-19 ENCOUNTER — OFFICE VISIT (OUTPATIENT)
Dept: CARDIOLOGY | Facility: CLINIC | Age: 41
End: 2021-05-19

## 2021-05-19 VITALS
DIASTOLIC BLOOD PRESSURE: 80 MMHG | WEIGHT: 236 LBS | BODY MASS INDEX: 39.32 KG/M2 | HEART RATE: 87 BPM | HEIGHT: 65 IN | OXYGEN SATURATION: 100 % | SYSTOLIC BLOOD PRESSURE: 124 MMHG

## 2021-05-19 DIAGNOSIS — Z72.0 TOBACCO ABUSE: ICD-10-CM

## 2021-05-19 DIAGNOSIS — E78.2 MIXED HYPERLIPIDEMIA: ICD-10-CM

## 2021-05-19 DIAGNOSIS — I25.10 CORONARY ARTERY DISEASE INVOLVING NATIVE CORONARY ARTERY OF NATIVE HEART WITHOUT ANGINA PECTORIS: Primary | ICD-10-CM

## 2021-05-19 PROCEDURE — 99214 OFFICE O/P EST MOD 30 MIN: CPT | Performed by: INTERNAL MEDICINE

## 2021-05-19 PROCEDURE — 93000 ELECTROCARDIOGRAM COMPLETE: CPT | Performed by: INTERNAL MEDICINE

## 2021-05-19 RX ORDER — CLONIDINE HYDROCHLORIDE 0.1 MG/1
0.1 TABLET ORAL NIGHTLY
COMMUNITY
Start: 2021-04-20

## 2021-05-19 RX ORDER — NITROGLYCERIN 0.4 MG/1
0.4 TABLET SUBLINGUAL
Qty: 30 TABLET | Refills: 12 | Status: SHIPPED | OUTPATIENT
Start: 2021-05-19

## 2021-05-19 RX ORDER — AMITRIPTYLINE HYDROCHLORIDE 25 MG/1
25 TABLET, FILM COATED ORAL NIGHTLY
COMMUNITY
Start: 2021-03-16

## 2021-05-19 NOTE — PROGRESS NOTES
Subjective:     Encounter Date:05/19/2021      Patient ID: Leidy Lerma is a 40 y.o. female with coronary artery disease, status post PCI to the right coronary artery at the time of an inferior STEMI in 2017, also with hyperlipidemia, tobacco abuse, presenting today for follow-up.    Chief Complaint: Routine follow-up    This patient presents today for routine follow-up.  She says that she has been doing well from a cardiac standpoint.  No significant chest pain, shortness of breath, dyspnea on exertion.  No palpitations, lightheadedness, dizziness or syncope.  She says that she does have considerable anxiety, however this seems to be may be improving at this time.  She reports good control of her blood pressure.  She has not had her cholesterol checked in quite some time.  She remains on high intensity statin therapy.  No significant side effects from this medication or any other medications.  Overall, she says that she seems to be doing well.  Unfortunately, she does continue to smoke but has cut back.    Coronary Artery Disease  Presents for follow-up visit. Pertinent negatives include no chest pain, dizziness, leg swelling, palpitations or shortness of breath. Risk factors include hyperlipidemia. The symptoms have been stable. Compliance with diet is variable. Compliance with exercise is variable. Compliance with medications is good.   Hyperlipidemia  This is a chronic problem. She has no history of diabetes. Factors aggravating her hyperlipidemia include beta blockers. Pertinent negatives include no chest pain, focal sensory loss, focal weakness, leg pain, myalgias or shortness of breath. Current antihyperlipidemic treatment includes statins. There are no compliance problems.  Risk factors for coronary artery disease include dyslipidemia.       Current Outpatient Medications:   •  amitriptyline (ELAVIL) 25 MG tablet, Take 25 mg by mouth Every Night., Disp: , Rfl:   •  aspirin 81 MG EC tablet, Take 1 tablet  by mouth Daily., Disp: 30 tablet, Rfl: 11  •  atorvastatin (LIPITOR) 80 MG tablet, Take 1 tablet by mouth Every Night., Disp: 90 tablet, Rfl: 4  •  busPIRone (BUSPAR) 30 MG tablet, Take 30 mg by mouth 2 (Two) Times a Day., Disp: , Rfl:   •  cloNIDine (CATAPRES) 0.1 MG tablet, Take 0.1 mg by mouth Every Night., Disp: , Rfl:   •  HUMIRA PEN 40 MG/0.4ML Pen-injector Kit, Inject 40 mg under the skin into the appropriate area as directed Every 14 (Fourteen) Days., Disp: , Rfl:   •  metoprolol tartrate (LOPRESSOR) 25 MG tablet, Take 1 tablet by mouth Every 12 (Twelve) Hours., Disp: 180 tablet, Rfl: 4  •  nitroglycerin (NITROSTAT) 0.4 MG SL tablet, Place 1 tablet under the tongue Every 5 (Five) Minutes As Needed for Chest Pain. Take no more than 3 doses in 15 minutes., Disp: 30 tablet, Rfl: 12  •  VENLAFAXINE HCL PO, Take 150 mg by mouth Daily., Disp: , Rfl:     Allergies   Allergen Reactions   • Penicillins Hives     Social History     Tobacco Use   • Smoking status: Current Every Day Smoker     Packs/day: 1.00     Types: Cigarettes   • Smokeless tobacco: Never Used   Substance Use Topics   • Alcohol use: Yes     Comment: 1-2 drinks a week of liquor     Review of Systems   Constitutional: Negative for fever and weight loss.   Cardiovascular: Negative for chest pain, dyspnea on exertion, leg swelling, orthopnea, palpitations, paroxysmal nocturnal dyspnea and syncope.   Respiratory: Negative for cough, shortness of breath and wheezing.    Hematologic/Lymphatic: Negative for adenopathy and bleeding problem.   Musculoskeletal: Negative for myalgias.   Gastrointestinal: Negative for abdominal pain, nausea and vomiting.   Neurological: Negative for dizziness, focal weakness, headaches and loss of balance.   Psychiatric/Behavioral: The patient is nervous/anxious.        ECG 12 Lead    Date/Time: 5/19/2021 2:36 PM  Performed by: Sergo Aguillno MD  Authorized by: Sergo Aguillon MD   Comparison: compared with  "previous ECG from 5/13/2019  Similar to previous ECG  Rhythm: sinus rhythm  Rate: normal  BPM: 89  Conduction: conduction normal  QRS axis: normal  Other findings: non-specific ST-T wave changes and left atrial abnormality    Clinical impression: non-specific ECG             Objective:     Vitals reviewed.   Constitutional:       General: Not in acute distress.     Appearance: Healthy appearance. Well-developed.   Eyes:      Extraocular Movements: Extraocular movements intact.      Pupils: Pupils are equal, round, and reactive to light.   HENT:      Head: Normocephalic and atraumatic.   Neck:      Thyroid: No thyroid mass.      Vascular: No JVD.   Pulmonary:      Effort: Pulmonary effort is normal.      Breath sounds: Normal breath sounds. No wheezing. No rhonchi. No rales.   Cardiovascular:      Normal rate. Regular rhythm.      Murmurs: There is no murmur.      No gallop.   Pulses:     Intact distal pulses.   Edema:     Peripheral edema absent.   Abdominal:      General: Bowel sounds are normal. There is no distension.      Palpations: Abdomen is soft.      Tenderness: There is no abdominal tenderness.   Musculoskeletal: Normal range of motion.      Extremities: No clubbing present.     Cervical back: Normal range of motion and neck supple. Skin:     General: Skin is warm and dry. There is no cyanosis.      Findings: No erythema or rash.   Neurological:      Mental Status: Alert and oriented to person, place, and time.      Cranial Nerves: No cranial nerve deficit.       /80   Pulse 87   Ht 165.1 cm (65\")   Wt 107 kg (236 lb)   SpO2 100%   BMI 39.27 kg/m²     Data/Lab Review:     Lab Results   Component Value Date    CHOL 248 (H) 10/06/2017    TRIG 273 (H) 10/06/2017    HDL 27 (L) 10/06/2017     (H) 10/06/2017     ================================================    Echocardiogram in October 2017: Normal left ventricular systolic function, borderline LVH, no significant valvular " abnormalities.    Cardiac catheterization and PCI in 2017: No significant disease in the left main coronary artery, mild disease in the LAD and left circumflex.  At the time of the initial angiographic images, the right coronary artery did have a subtotal occlusion in the midportion of the vessel.  This was treated with a 3.5 x 23 mm Xience drug-eluting stent, postdilated in the proximal segment with a 4.0 x 12 mm noncompliant balloon.      Assessment:          Diagnosis Plan   1. Coronary artery disease involving native coronary artery of native heart without angina pectoris  nitroglycerin (NITROSTAT) 0.4 MG SL tablet    ECG 12 Lead   2. Mixed hyperlipidemia     3. Tobacco abuse            Plan:       1.  Coronary artery disease: The patient is stable at this time.  She does not describe any ischemic symptoms.  She remains on aspirin, beta-blocker and statin therapies.  She needs a refill on sublingual nitroglycerin although she has not required any doses.     2.  Mixed hyperlipidemia: The patient's last LDL cholesterol in our system is referenced above, showing an LDL cholesterol nowhere near goal, however this was at the time of her myocardial infarction.  She remains on high intensity statin therapy.  She says that she will have her lipids checked at some point in near future in [place] and have these sent to us for review.    Leidy Lerma  reports that she has been smoking cigarettes. She has been smoking about 1.00 pack per day. She has never used smokeless tobacco.. I have educated her on the risk of diseases from using tobacco products such as heart disease.  I advised her to quit and she is not willing to quit. I spent 3  minutes counseling the patient.    We will plan to see the patient back in 12 months unless otherwise needed sooner.

## 2021-05-25 ENCOUNTER — TELEPHONE (OUTPATIENT)
Dept: PSYCHIATRY | Age: 41
End: 2021-05-25

## 2021-05-25 NOTE — TELEPHONE ENCOUNTER
Called pt for appointment reminder.   -Pt confirmed    Electronically signed by Dale Rader MA on 5/25/2021 at 9:13 AM

## 2021-05-26 ENCOUNTER — VIRTUAL VISIT (OUTPATIENT)
Dept: PSYCHIATRY | Age: 41
End: 2021-05-26
Payer: COMMERCIAL

## 2021-05-26 ENCOUNTER — TELEPHONE (OUTPATIENT)
Dept: PSYCHIATRY | Age: 41
End: 2021-05-26

## 2021-05-26 DIAGNOSIS — F33.0 MILD EPISODE OF RECURRENT MAJOR DEPRESSIVE DISORDER (HCC): Primary | ICD-10-CM

## 2021-05-26 PROCEDURE — 98968 PH1 ASSMT&MGMT NQHP 21-30: CPT | Performed by: SOCIAL WORKER

## 2021-05-26 NOTE — PROGRESS NOTES
Jarrett Moctezuma is a 36 y.o. female evaluated via telephone on 5/26/2021. Consent:  She and/or health care decision maker is aware that that she may receive a bill for this telephone service, depending on her insurance coverage, and has provided verbal consent to proceed: Yes      Documentation:  I communicated with the patient and/or health care decision maker about see progress note. Details of this discussion including any medical advice provided: see progress note. I affirm this is a Patient Initiated Episode with a Patient who has not had a related appointment within my department in the past 7 days or scheduled within the next 24 hours. Patient identification was verified at the start of the visit: Yes    Total Time: minutes: 21-30 minutes    The visit was conducted pursuant to the emergency declaration under the [address] [place] [address], [address] [place] [address] authority and the [employer] General Act. Patient identification was verified, and a caregiver was present when appropriate. The patient was located in a state where the provider was credentialed to provide care. Note: not billable if this call serves to triage the patient into an appointment for the relevant concern      PATRICIO Flores     Therapy Progress Note  Syeda PETERS LCSW  5/26/2021  8:42 AM  9:42 AM    Patient location  : Home  [facility] location : [address] [place] [address]      Time spent with Patient: 60 minutes  This is patient's 17th  Therapy appointment. Reason for Consult:  depression and anxiety  Referring Provider: No referring provider defined for this encounter. Jarrett Moctezuma ,a 36 y.o. female, for initial evaluation visit.      Pt provided informed consent for the behavioral health program. Discussed with patient model of service to include the limits of confidentiality (i.e. abuse reporting, suicide intervention, etc.) and short-term intervention focused Comment: \"I used to drink a lot. I drink 2 to 3 drinks a month now\".  Drug use: Yes     Types: Marijuana     Comment: uses 9/4/20 marijuana every day     Sexual activity: Not Currently     Comment: 9/4/20 no birth control, not sexually active   Other Topics Concern    Not on file   Social History Narrative    PREVIOUS MEDICATION TRIALS    Ambien    Effexor XR (current, 150mg)    Prozac (several years, 40mg, doesn't remember why she stopped)    Elavil    Zoloft (several years, doesn't remember the dose, stopped taking because she was pregnant)    Xanax         No negative effect with taking SSRI's     Social Determinants of Health     Financial Resource Strain: Low Risk     Difficulty of Paying Living Expenses: Not hard at all   Food Insecurity: No Food Insecurity    Worried About Running Out of Food in the Last Year: Never true    Yesy of Food in the Last Year: Never true   Transportation Needs: No Transportation Needs    Lack of Transportation (Medical): No    Lack of Transportation (Non-Medical):  No   Physical Activity:     Days of Exercise per Week:     Minutes of Exercise per Session:    Stress:     Feeling of Stress :    Social Connections:     Frequency of Communication with Friends and Family:     Frequency of Social Gatherings with Friends and Family:     Attends Anabaptist Services:     Active Member of Clubs or Organizations:     Attends Club or Organization Meetings:     Marital Status:    Intimate Partner Violence:     Fear of Current or Ex-Partner:     Emotionally Abused:     Physically Abused:     Sexually Abused:        Medications:   Current Outpatient Medications   Medication Sig Dispense Refill    cloNIDine (CATAPRES) 0.1 MG tablet Take 1 tablet by mouth nightly 90 tablet 0    busPIRone (BUSPAR) 30 MG tablet Take 30 mg by mouth 2 times daily 180 tablet 0    venlafaxine (EFFEXOR XR) 75 MG extended release capsule TAKE 1 CAPSULE BY MOUTH ONCE DAILY (WITH  150  MG  TO Zoloft (several years, doesn't remember the dose, stopped taking because she was pregnant)    Xanax         No negative effect with taking SSRI's     Social Determinants of Health     Financial Resource Strain: Low Risk     Difficulty of Paying Living Expenses: Not hard at all   Food Insecurity: No Food Insecurity    Worried About Running Out of Food in the Last Year: Never true    [address] in the Last Year: Never true   Transportation Needs: No Transportation Needs    Lack of Transportation (Medical): No    Lack of Transportation (Non-Medical): No   Physical Activity:     Days of Exercise per Week:     Minutes of Exercise per Session:    Stress:     Feeling of Stress :    Social Connections:     Frequency of Communication with Friends and Family:     Frequency of Social Gatherings with Friends and Family:     Attends Hindu Services:     Active Member of Clubs or Organizations:     Attends Club or Organization Meetings:     Marital Status:    Intimate Partner Violence:     Fear of Current or Ex-Partner:     Emotionally Abused:     Physically Abused:     Sexually Abused:        TOBACCO:   reports that she has been smoking. She started smoking about 25 years ago. She has been smoking about 1.00 pack per day. She has never used smokeless tobacco.  ETOH:   reports current alcohol use of about 2.0 standard drinks of alcohol per week. Family History:   Family History   Problem Relation Age of Onset    High Blood Pressure Mother     Depression Mother     Anxiety Disorder Mother     Heart Attack Father     Heart Disease Father     High Cholesterol Father        Diagnosis:    The encounter diagnosis was Mild episode of recurrent major depressive disorder ([address]        Diagnosis Date    CAD (coronary artery disease)     MI about 1 1/2 yrs ago    CHF (congestive heart failure) ([facility] [address]     Hyperlipidemia     Hypertension     kidney stones     VINOD (obstructive sleep apnea)     uses CPAP    Psoriasis     Psychiatric problem     hx of depression and anxiety since a teenager. no problems    Plan:  1. Continue medication management  2. CBT to target cognitive distortions  3. Discuss therapeutic goals  A.  Setting boundaries   B. Assertive Communications    Pt interventions:  Trained in strategies for increasing balanced thinking, Provided education, Discussed self-care (sleep, nutrition, rewarding activities, social support, exercise), [place]-setting to identify pt's primary goals for [facility] visit / overall health, Supportive techniques, Emphasized self-care as important for managing overall health and Identified maladaptive thoughts      Raciel Malhotra MSW, LCSW

## 2021-06-16 RX ORDER — AMITRIPTYLINE HYDROCHLORIDE 25 MG/1
25 TABLET, FILM COATED ORAL NIGHTLY
Qty: 90 TABLET | Refills: 0 | Status: SHIPPED | OUTPATIENT
Start: 2021-06-16 | End: 2021-11-08 | Stop reason: SDUPTHER

## 2021-06-16 NOTE — TELEPHONE ENCOUNTER
Godfrey Randolph called to request refill on her medication      Last office visit: 5/26/2021  Next office visit: 7/21/2021    Requested Prescriptions     Pending Prescriptions Disp Refills    amitriptyline (ELAVIL) 25 MG tablet [Pharmacy Med Name: Amitriptyline HCl 25 MG Oral Tablet] 90 tablet 0     Sig: Take 1 tablet by mouth nightly        Virtual Visit  4/12/2021  [address] [facility]   GILMA Nixon NP  Nurse Practitioner Psychiatric/Mental Health  Mild episode of recurrent major depressive disorder ([facility] [address] +4 more  Dx  Medication Check  Follow-up  Depression  Reason for Visit   Progress Notes  GILMA Nixon NP (Advanced Practice Nurse) [facility] [address]  []Expand All by Default  Godfrey Randolph is a 36 y.o. female evaluated via telephone on 4/12/2021.       Consent:  She and/or health care decision maker is aware that that she may receive a bill for this telephone service, depending on her insurance coverage, and has provided verbal consent to proceed: Yes        Documentation:  I communicated with the patient and/or health care decision maker about anxiety/depression.    Details of this discussion including any medical advice provided: see below        I affirm this is a Patient Initiated Episode with a Patient who has not had a related appointment within my department in the past 7 days or scheduled within the next 24 hours.     Patient identification was verified at the start of the visit: Yes     Total Time: minutes: 11-20 minutes     Note: not billable if this call serves to triage the patient into an appointment for the relevant concern        Suyapa Diamond         4/12/2021 1:32 PM                             Progress Note     IN:  [place]: [address] 1980                                 Chief Complaint   Patient presents with    Medication Check    Follow-up    Depression            Subjective: sores, itching, breast lumps, breast pain, nipple discharge, hair loss)     Neurologic: (HA, muscle weakness, paresthesias (numbness, coldness, crawling or prickling), memory loss, seizure, dizziness)     Psychiatric:  (anxiety, sadness, irritability/anger, insomnia, suicidality)     Endocrine: (heat or cold intolerance, excessive thirst (polydipsia), excessive hunger (polyphagia))     Immune/Allergic: (hives, seasonal or environmental allergies, HIV exposure)     Hematologic/Lymphatic: (lymph node enlargement, easy bleeding or bruising)     History obtained via chart review and patient     PCP is Salome Gómez DO         Current Meds:     Home Medications           Prior to Admission medications    Medication Sig Start Date End Date Taking?  Authorizing Provider   busPIRone (BUSPAR) 30 MG tablet Take 30 mg by mouth 2 times daily 4/12/21   Yes GILMA Arevalo NP   venlafaxine (EFFEXOR XR) 75 MG extended release capsule TAKE 1 CAPSULE BY MOUTH ONCE DAILY (WITH  150  MG  TO  MAKE  225  MG  DOSE) 3/16/21     GILMA Arevalo NP   venlafaxine (EFFEXOR XR) 150 MG extended release capsule Take 1 capsule by mouth once daily 3/16/21     GILMA Arevalo NP   amitriptyline (ELAVIL) 25 MG tablet Take 1 tablet by mouth nightly 3/16/21     GILMA Arevalo NP   cloNIDine (CATAPRES) 0.1 MG tablet Take 1 tablet by mouth nightly 9/4/20     GILMA Arevalo NP   adalimumab (HUMIRA) 40 MG/0.8ML injection Inject 40 mg into the skin every 14 days Indications: Psoriasis       Historical Provider, MD   metoprolol tartrate (LOPRESSOR) 25 MG tablet 25 mg nightly  4/1/19     Historical Provider, MD   atorvastatin (LIPITOR) 80 MG tablet Take by mouth nightly  3/28/19     Historical Provider, MD   BRILINTA 90 MG TABS tablet Take 90 mg by mouth nightly  4/1/19     Historical Provider, MD   ondansetron (ZOFRAN-ODT) 4 MG disintegrating tablet Take 4 mg by mouth every 6 hours as needed for Nausea or Vomiting       Historical Provider, MD   aspirin 81 MG tablet Take 81 mg by mouth daily       Historical Provider, MD         Social History   Social History            Socioeconomic History    Marital status:        Spouse name: None    Number of children: 2    Years of education: assoc degree    Highest education level: None   Occupational History    None   Social Needs    Financial resource strain: Not hard at all   Krishna-Michelle insecurity       Worry: Never true       Inability: Never true    Transportation needs       Medical: No       Non-medical: No   Tobacco Use    Smoking status: Current Every Day Smoker       Packs/day: 1.00       Start date: 56    Smokeless tobacco: Never Used    Tobacco comment: 6/5/19 pt given in on smoking and smoking cessation. Substance and Sexual Activity    Alcohol use: Yes       Alcohol/week: 2.0 standard drinks       Types: 1 Glasses of wine, 1 Shots of liquor per week       Frequency: Monthly or less       Drinks per session: 1 or 2       Comment: \"I used to drink a lot. I drink 2 to 3 drinks a month now\".  Drug use:  Yes       Types: Marijuana       Comment: uses 9/4/20 marijuana every day     Sexual activity: Not Currently       Comment: 9/4/20 no birth control, not sexually active   Lifestyle    Physical activity       Days per week: None       Minutes per session: None    Stress: None   Relationships    Social connections       Talks on phone: None       Gets together: None       Attends Synagogue service: None       Active member of club or organization: None       Attends meetings of clubs or organizations: None       Relationship status: None    Intimate partner violence       Fear of current or ex partner: None       Emotionally abused: None       Physically abused: None       Forced sexual activity: None   Other Topics Concern    None   Social History Narrative     PREVIOUS MEDICATION TRIALS     Ambien     Effexor XR (current, 150mg)     Prozac (several years, 40mg, doesn't remember why she stopped)     Elavil     Zoloft (several years, doesn't remember the dose, stopped taking because she was pregnant)     Xanax           No negative effect with taking SSRI's            MSE:  Patient is  A & O x 4. Appearance:  JOY. Cognition:  Recent memory intact , remote memory intact , good fund of knowledge, average  intelligence level. Speech:  normal  Language: Naming: intact; Word Finding: intact  Conversation no evidence of delusions  Behavior:  Cooperative  Mood: \"focused\"   Affect: congruent with mood  Thought Content: negative delusions, negative hallucinations, negative obsessions,  negativehomicidal and negative suicidal   Thought Process: linear, goal directed and coherent  Associations: logical connections  Attention Span and concentration: Normal   Judgement Insight:  normal and appropriate  Gait and Station:JOY   Sleep: avg 6-7 hrs (is using an glenroy called [employer])  Appetite: ok        No results found for: NA, K, CL, CO2, BUN, CREATININE, GLUCOSE, CALCIUM, PROT, LABALBU, BILITOT, ALKPHOS, AST, ALT, LABGLOM, GFRAA, AGRATIO, GLOB  No results found for: NA, K, CL, CO2, BUN, CREATININE, GLUCOSE, CALCIUM   No results found for: CHOL  No results found for: TRIG  No results found for: HDL  No results found for: LDLCHOLESTEROL, LDLCALC  No results found for: LABVLDL, VLDL  No results found for: CHOLHDLRATIO  No results found for: LABA1C  No results found for: EAG  No results found for: TSHFT4, TSH  No results found for: VITD25     Assessments Administered:     PHQ9: 9, mild   GAD7: 8, mild       Assessment:    1. Mild episode of recurrent major depressive disorder ([facility] [address]    2. CANDE (generalized anxiety disorder)    3. Insomnia due to other mental disorder    4. Poor concentration    5.  Cannabis use disorder, mild, abuse          Plan:  Continue:  Effexor XR, 225mg, daily  Buspirone, 30mg, twice daily  Amitriptyline, 25mg, nightly  Clonidine, 0.1mg, nightly (for focus, and anxiety)  Melatonin, 3mg up to 10mg, nightly as needed for sleep initiation (hasn't used it for the last couple of weeks and hasn't needed it)     Continue therapy with GALI Gonzalez     Follow up: Return in about 4 months (around 8/12/2021).    If you become suicidal, follow up with this office or call the [facility] at [phone]), or dial [phone].     1. The risks, benefits, side effects, indications, contraindications, and adverse effects of the medications have been discussed. Yes.  2. The pt has verbalized understanding and has capacity to give informed consent. 3. The [facility] report has been reviewed according to [facility] regulations. 4. Supportive therapy offered. 5. Follow up:    Return in about 4 months (around 8/12/2021). 6. The patient has been advised to call with any problems. 7. Controlled substance Treatment Plan: none. 8. The above listed medications have been continued, modifications in meds and other orders/labs as follows:                 Encounter Medications         Orders Placed This Encounter   Medications    busPIRone (BUSPAR) 30 MG tablet       Sig: Take 30 mg by mouth 2 times daily       Dispense:  180 tablet       Refill:  0       Fill when due.                     No orders of the defined types were placed in this encounter.        9. Additional comments: She continues to do well on these medications. Will make no changes today and will follow up in about 4 months. .. .     10. Over 50% of the total visit time of 20 minutes was spent on counseling and/or coordination of care of:                         1. Mild episode of recurrent major depressive disorder ([facility] [address]    2. CANDE (generalized anxiety disorder)    3. Insomnia due to other mental disorder    4. Poor concentration    5.  Cannabis use disorder, mild, abuse                        Psychotherapy Topics: mood/medication effectiveness         Joanne Millard PMHNP-BC      Instructions       Return in about 4 mouth every 6 hours as needed for Nausea or Vomiting   aspirin 81 MG tablet Take 81 mg by mouth daily   Medication Changes       None     Medication List   Visit Diagnoses       Mild episode of recurrent major depressive disorder (HCC)     CANDE (generalized anxiety disorder)     Insomnia due to other mental disorder     Poor concentration     Cannabis use disorder, mild, abuse     Problem List

## 2021-06-17 ENCOUNTER — TELEPHONE (OUTPATIENT)
Dept: PSYCHIATRY | Age: 41
End: 2021-06-17

## 2021-07-21 ENCOUNTER — TELEPHONE (OUTPATIENT)
Dept: PSYCHIATRY | Age: 41
End: 2021-07-21

## 2021-07-21 ENCOUNTER — VIRTUAL VISIT (OUTPATIENT)
Dept: PSYCHIATRY | Age: 41
End: 2021-07-21
Payer: COMMERCIAL

## 2021-07-21 DIAGNOSIS — F33.0 MILD EPISODE OF RECURRENT MAJOR DEPRESSIVE DISORDER (HCC): Primary | ICD-10-CM

## 2021-07-21 PROCEDURE — 98968 PH1 ASSMT&MGMT NQHP 21-30: CPT | Performed by: SOCIAL WORKER

## 2021-07-21 NOTE — PROGRESS NOTES
Ollie De La Cruz is a 36 y.o. female evaluated via telephone on 7/21/2021. Consent:  She and/or health care decision maker is aware that that she may receive a bill for this telephone service, depending on her insurance coverage, and has provided verbal consent to proceed: Yes      Documentation:  I communicated with the patient and/or health care decision maker about see progress note. Details of this discussion including any medical advice provided: see progress note. I affirm this is a Patient Initiated Episode with a Patient who has not had a related appointment within my department in the past 7 days or scheduled within the next 24 hours. Patient identification was verified at the start of the visit: Yes    Total Time: minutes: 21-30 minutes    The visit was conducted pursuant to the emergency declaration under the [address] [place] [address], [address] [place] [address] authority and the [employer] General Act. Patient identification was verified, and a caregiver was present when appropriate. The patient was located in a state where the provider was credentialed to provide care. Note: not billable if this call serves to triage the patient into an appointment for the relevant concern      PATRICIO Ignacio     Therapy Progress Note  Troy PETERS, KENN  7/21/2021  10:00 AM  10:50 AM    Patient location  :Home  [facility] location : [address] [place] [address]      Time spent with Patient: 50 minutes  This is patient's 18th  Therapy appointment. Reason for Consult:  depression and anxiety  Referring Provider: No referring provider defined for this encounter. Ollie De La Cruz ,a 36 y.o. female, for initial evaluation visit.      Pt provided informed consent for the behavioral health program. Discussed with patient model of service to include the limits of confidentiality (i.e. abuse reporting, suicide intervention, etc.) and short-term intervention focused approach. Discussed no show and late cancellation policy. Pt indicated understanding. S:  Pt shared about having crystal parties and attending full moon parties. Pt shared updated on her children, work, home, children's father, and the children's father's ex. Pt reported she was doing well and has increased self-care. Pt is potty training her cats and they are doing very well. Pt reported she would like to continue the 2 months appointments and verbalized she would call sooner if needed. Pt denies Suicidal Ideations, Homicidal Ideation, Auditory Hallucinations, Visual Hallucinations, Tactical Hallucinations. MSE:    Sounded    alert, cooperative, no distress  Appetite normal  Sleep disturbance No  Fatigue No  Loss of pleasure No  Impulsive behavior No  Speech    normal rate and normal volume  Mood    Stable    Thought Content    intact  Thought Process    goal directed  Associations    logical connections  Insight    Good  Judgment    Intact  Orientation    oriented to person, place, time, and general circumstances  Memory    recent and remote memory intact  Attention/Concentration    intact  Morbid ideation No  Suicide Assessment    no suicidal ideation      History:  Social History     Socioeconomic History    Marital status:      Spouse name: Not on file    Number of children: 2    Years of education: assoc degree    Highest education level: Not on file   Occupational History    Not on file   Tobacco Use    Smoking status: Current Every Day Smoker     Packs/day: 1.00     Start date: 56    Smokeless tobacco: Never Used    Tobacco comment: 6/5/19 pt given in on smoking and smoking cessation. Vaping Use    Vaping Use: Former    Quit date: 10/15/2019    Substances: Never   Substance and Sexual Activity    Alcohol use: Yes     Alcohol/week: 2.0 standard drinks     Types: 1 Glasses of wine, 1 Shots of liquor per week     Comment: \"I used to drink a lot.   I drink 2 to 3 drinks a month now\".    Drug use: Yes     Types: Marijuana     Comment: uses 9/4/20 marijuana every day     Sexual activity: Not Currently     Comment: 9/4/20 no birth control, not sexually active   Other Topics Concern    Not on file   Social History Narrative    PREVIOUS MEDICATION TRIALS    Ambien    Effexor XR (current, 150mg)    Prozac (several years, 40mg, doesn't remember why she stopped)    Elavil    Zoloft (several years, doesn't remember the dose, stopped taking because she was pregnant)    Xanax         No negative effect with taking SSRI's     Social Determinants of Health     Financial Resource Strain: Low Risk     Difficulty of Paying Living Expenses: Not hard at all   Food Insecurity: No Food Insecurity    Worried About Running Out of Food in the Last Year: Never true    Yesy of Food in the Last Year: Never true   Transportation Needs: No Transportation Needs    Lack of Transportation (Medical): No    Lack of Transportation (Non-Medical):  No   Physical Activity:     Days of Exercise per Week:     Minutes of Exercise per Session:    Stress:     Feeling of Stress :    Social Connections:     Frequency of Communication with Friends and Family:     Frequency of Social Gatherings with Friends and Family:     Attends Sabianism Services:     Active Member of Clubs or Organizations:     Attends Club or Organization Meetings:     Marital Status:    Intimate Partner Violence:     Fear of Current or Ex-Partner:     Emotionally Abused:     Physically Abused:     Sexually Abused:        Medications:   Current Outpatient Medications   Medication Sig Dispense Refill    amitriptyline (ELAVIL) 25 MG tablet Take 1 tablet by mouth nightly 90 tablet 0    cloNIDine (CATAPRES) 0.1 MG tablet Take 1 tablet by mouth nightly 90 tablet 0    busPIRone (BUSPAR) 30 MG tablet Take 30 mg by mouth 2 times daily 180 tablet 0    venlafaxine (EFFEXOR XR) 75 MG extended release capsule TAKE 1 CAPSULE BY MOUTH ONCE DAILY (WITH  150  MG  TO  MAKE  225  MG  DOSE) 90 capsule 0    venlafaxine (EFFEXOR XR) 150 MG extended release capsule Take 1 capsule by mouth once daily 90 capsule 0    adalimumab (HUMIRA) 40 MG/0.8ML injection Inject 40 mg into the skin every 14 days Indications: Psoriasis      metoprolol tartrate (LOPRESSOR) 25 MG tablet 25 mg nightly   11    atorvastatin (LIPITOR) 80 MG tablet Take by mouth nightly   11    BRILINTA 90 MG TABS tablet Take 90 mg by mouth nightly   11    ondansetron (ZOFRAN-ODT) 4 MG disintegrating tablet Take 4 mg by mouth every 6 hours as needed for Nausea or Vomiting      aspirin 81 MG tablet Take 81 mg by mouth daily       No current facility-administered medications for this visit. Social History:   Social History     Socioeconomic History    Marital status:      Spouse name: Not on file    Number of children: 2    Years of education: assoc degree    Highest education level: Not on file   Occupational History    Not on file   Tobacco Use    Smoking status: Current Every Day Smoker     Packs/day: 1.00     Start date: 56    Smokeless tobacco: Never Used    Tobacco comment: 6/5/19 pt given in on smoking and smoking cessation. Vaping Use    Vaping Use: Former    Quit date: 10/15/2019    Substances: Never   Substance and Sexual Activity    Alcohol use: Yes     Alcohol/week: 2.0 standard drinks     Types: 1 Glasses of wine, 1 Shots of liquor per week     Comment: \"I used to drink a lot. I drink 2 to 3 drinks a month now\".     Drug use: Yes     Types: Marijuana     Comment: uses 9/4/20 marijuana every day     Sexual activity: Not Currently     Comment: 9/4/20 no birth control, not sexually active   Other Topics Concern    Not on file   Social History Narrative    PREVIOUS MEDICATION TRIALS    Ambien    Effexor XR (current, 150mg)    Prozac (several years, 40mg, doesn't remember why she stopped)    Elavil    Zoloft (several years, doesn't remember the dose, stopped taking because she was pregnant)    Xanax         No negative effect with taking SSRI's     Social Determinants of Health     Financial Resource Strain: Low Risk     Difficulty of Paying Living Expenses: Not hard at all   Food Insecurity: No Food Insecurity    Worried About Running Out of Food in the Last Year: Never true    Yesy of Food in the Last Year: Never true   Transportation Needs: No Transportation Needs    Lack of Transportation (Medical): No    Lack of Transportation (Non-Medical): No   Physical Activity:     Days of Exercise per Week:     Minutes of Exercise per Session:    Stress:     Feeling of Stress :    Social Connections:     Frequency of Communication with Friends and Family:     Frequency of Social Gatherings with Friends and Family:     Attends Mu-ism Services:     Active Member of Clubs or Organizations:     Attends Club or Organization Meetings:     Marital Status:    Intimate Partner Violence:     Fear of Current or Ex-Partner:     Emotionally Abused:     Physically Abused:     Sexually Abused:        TOBACCO:   reports that she has been smoking. She started smoking about 25 years ago. She has been smoking about 1.00 pack per day. She has never used smokeless tobacco.  ETOH:   reports current alcohol use of about 2.0 standard drinks of alcohol per week. Family History:   Family History   Problem Relation Age of Onset    High Blood Pressure Mother     Depression Mother     Anxiety Disorder Mother     Heart Attack Father     Heart Disease Father     High Cholesterol Father        Diagnosis:    The encounter diagnosis was Mild episode of recurrent major depressive disorder ([facility] [address]        Diagnosis Date    CAD (coronary artery disease)     MI about 1 1/2 yrs ago    CHF (congestive heart failure) ([facility] [address]     Hyperlipidemia     Hypertension     kidney stones     VINOD (obstructive sleep apnea)     uses CPAP    Psoriasis     Psychiatric problem     hx of depression and anxiety since a teenager. no problems    Plan:  1. Continue medication management  2. CBT to target cognitive distortions  3. Discuss therapeutic goals  A.  Setting boundaries   B. Assertive Communications    Pt interventions:  Trained in strategies for increasing balanced thinking, Provided education, Discussed self-care (sleep, nutrition, rewarding activities, social support, exercise), Bostic-setting to identify pt's primary goals for [facility] visit / overall health, Supportive techniques, Emphasized self-care as important for managing overall health and Identified maladaptive thoughts      Rogerio Taylor MSW, LCSW

## 2021-07-21 NOTE — TELEPHONE ENCOUNTER
Pt was called for virtual appointment check in.       Electronically signed by Luz Torres MA on 7/21/2021 at 9:44 AM

## 2021-07-21 NOTE — PATIENT INSTRUCTIONS
The Best Possible Self  The Best Possible Self (BPS) exercise can be used to increase optimism. The BPS requires people to envision themselves in an imaginary future in which everything has turned out in the most optimal way. Over the past years, writing about and imagining a BPS has repeatedly been demonstrated to increase peoples mood and well-being Yukijoyce Almaraz, 2001; Etienne Rogers al., 2010; Gaurang Wilkerson, 2006). Dyann Hammans et al. (2010) provided evidence that writing about and imagining a BPS can also increase optimism in terms of expecting favorable outcomes. This effect was independent of the effect on mood that was simultaneously increased by the manipulation. Goal  The BPS exercise can be used to increase optimism in terms of expecting favorable outcomes (see for instance Janak et al., 2011). Advice  While in most cases the exercise is used in a written form, it is also possible to ask clients to make drawings of their best possible self. The most powerful way to use the exercise is by instructing clients to visualize their best possible self on a daily basis. Tool Description  1. Set a timer or stopwatch for 10 minutes, during this time you are to think about your best possible future self and to write it down on paper. 2. Imagine your life the way you always imagined it would be like, your best possible self. Picture that you have performed to the best of your abilities and you had achieved the things you wanted to in life. 3. While writing, dont worry about grammar or punctuation just focus on writing all your thoughts and emotions in an expressive way. You may want to have several sheets of paper for this exercise. 4. Reflection: after completing the initial exercise, you must reflect on your feelings and answer. Think about the following questions: What effects did this exercise have?  Does this exercise affect you more emotionally or does it affect your current self-image?    Did it motivate or inspire you?  Does it make you want to make changes?  How did this exercise affect you overall?

## 2021-07-26 RX ORDER — BUSPIRONE HYDROCHLORIDE 30 MG/1
TABLET ORAL
Qty: 180 TABLET | Refills: 0 | Status: SHIPPED | OUTPATIENT
Start: 2021-07-26 | End: 2021-11-08 | Stop reason: SDUPTHER

## 2021-07-26 NOTE — TELEPHONE ENCOUNTER
is a 36 y.o. female diagnosed with MDD and presents today for follow-up. Last seen in clinic on 12/10/20 and prior records were reviewed.     Today patient states, \"I'm doing ok. \" She reports that she has no concerns with her medication and that it is working. She reports that custody arrangements have been determined.       Patient reports side effects as follows: none. No evidence of EPS, no cogwheeling or abnormal motor movements.     Absent  suicidal ideation.   Reports compliance with medications as good .      Current Substance Use:  See history     BP: There were no vitals taken for this visit.        Review of Systems - 14 point review:  Negative except being treated for: psoriasis, hx of MI, HTN, anxiety, depression, insomnia, cannabis dependence        Constitutional: (fevers, chills, night sweats, wt loss/gain, change in appetite, fatigue, somnolence)     HEENT: (ear pain or discharge, hearing loss, ear ringing, sinus pressure, nosebleed, nasal discharge, sore throat, oral sores, tooth pain, bleeding gums, hoarse voice, neck pain)      Cardiovascular: (HTN, chest pain, elevated cholesterol/lipids, palpitations, leg swelling, leg pain with walking)     Respiratory: (cough, wheezing, snoring, SOB with activity (dyspnea), SOB while lying flat (orthopnea), awakening with severe SOB (paroxysmal nocturnal dyspnea))     Gastrointestinal: (NVD, constipation, abdominal pain, bright red stools, black tarry stools, stool incontinence)     Genitourinary:  (pelvic pain, burning or frequency of urination, urinary urgency, blood in urine incomplete bladder emptying, urinary incontinence, STD; MEN: testicular pain or swelling, erectile dysfunction; WOMEN: LMP, heavy menstrual bleeding (menorrhagia), irregular periods, postmenopausal bleeding, menstrual pain (dymenorrhea, vaginal discharge)     Musculoskeletal: (bone pain/fracture, joint pain or swelling, musle pain)     Integumentary: (rashes, acne, non-healing sores, itching, breast lumps, breast pain, nipple discharge, hair loss)     Neurologic: (HA, muscle weakness, paresthesias (numbness, coldness, crawling or prickling), memory loss, seizure, dizziness)     Psychiatric:  (anxiety, sadness, irritability/anger, insomnia, suicidality)     Endocrine: (heat or cold intolerance, excessive thirst (polydipsia), excessive hunger (polyphagia))     Immune/Allergic: (hives, seasonal or environmental allergies, HIV exposure)     Hematologic/Lymphatic: (lymph node enlargement, easy bleeding or bruising)     History obtained via chart review and patient     PCP is Adriane Rivas,          Current Meds:     Home Medications           Prior to Admission medications    Medication Sig Start Date End Date Taking?  Authorizing Provider   busPIRone (BUSPAR) 30 MG tablet Take 30 mg by mouth 2 times daily 4/12/21   Yes Joseph Blue, APRN - NP   venlafaxine (EFFEXOR XR) 75 MG extended release capsule TAKE 1 CAPSULE BY MOUTH ONCE DAILY (WITH  150  MG  TO  MAKE  225  MG  DOSE) 3/16/21     Joseph Blue, APRN - NP   venlafaxine (EFFEXOR XR) 150 MG extended release capsule Take 1 capsule by mouth once daily 3/16/21     Joseph Blue, APRN - NP   amitriptyline (ELAVIL) 25 MG tablet Take 1 tablet by mouth nightly 3/16/21     Joseph Blue, APRN - NP   cloNIDine (CATAPRES) 0.1 MG tablet Take 1 tablet by mouth nightly 9/4/20     Joseph Blue, APRN - NP   adalimumab (HUMIRA) 40 MG/0.8ML injection Inject 40 mg into the skin every 14 days Indications: Psoriasis       Historical Provider, MD   metoprolol tartrate (LOPRESSOR) 25 MG tablet 25 mg nightly  4/1/19     Historical Provider, MD   atorvastatin (LIPITOR) 80 MG tablet Take by mouth nightly  3/28/19     Historical Provider, MD   BRILINTA 90 MG TABS tablet Take 90 mg by mouth nightly  4/1/19     Historical Provider, MD   ondansetron (ZOFRAN-ODT) 4 MG disintegrating tablet Take 4 mg by mouth every 6 hours as needed for Nausea or Vomiting       Historical Provider, MD   aspirin 81 MG tablet Take 81 mg by mouth daily       Historical Provider, MD         Social History   Social History            Socioeconomic History    Marital status:        Spouse name: None    Number of children: 2    Years of education: assoc degree    Highest education level: None   Occupational History    None   Social Needs    Financial resource strain: Not hard at all   Krishna-Michelle insecurity       Worry: Never true       Inability: Never true    Transportation needs       Medical: No       Non-medical: No   Tobacco Use    Smoking status: Current Every Day Smoker       Packs/day: 1.00       Start date: [address]    Smokeless tobacco: Never Used    Tobacco comment: 6/5/19 pt given in on smoking and smoking cessation. Substance and Sexual Activity    Alcohol use: Yes       Alcohol/week: 2.0 standard drinks       Types: 1 Glasses of wine, 1 Shots of liquor per week       Frequency: Monthly or less       Drinks per session: 1 or 2       Comment: \"I used to drink a lot. I drink 2 to 3 drinks a month now\".  Drug use:  Yes       Types: Marijuana       Comment: uses 9/4/20 marijuana every day     Sexual activity: Not Currently       Comment: 9/4/20 no birth control, not sexually active   Lifestyle    Physical activity       Days per week: None       Minutes per session: None    Stress: None   Relationships    Social connections       Talks on phone: None       Gets together: None       Attends Anabaptism service: None       Active member of club or organization: None       Attends meetings of clubs or organizations: None       Relationship status: None    Intimate partner violence       Fear of current or ex partner: None       Emotionally abused: None       Physically abused: None       Forced sexual activity: None   Other Topics Concern    None   Social History Narrative     PREVIOUS MEDICATION TRIALS     Ambien     Effexor XR (current, 150mg)     Prozac (several years, 40mg, doesn't remember why she stopped)     Elavil     Zoloft (several years, doesn't remember the dose, stopped taking because she was pregnant)     Xanax           No negative effect with taking SSRI's            MSE:  Patient is  A & O x 4. Appearance:  JOY. Cognition:  Recent memory intact , remote memory intact , good fund of knowledge, average  intelligence level. Speech:  normal  Language: Naming: intact; Word Finding: intact  Conversation no evidence of delusions  Behavior:  Cooperative  Mood: \"focused\"   Affect: congruent with mood  Thought Content: negative delusions, negative hallucinations, negative obsessions,  negativehomicidal and negative suicidal   Thought Process: linear, goal directed and coherent  Associations: logical connections  Attention Span and concentration: Normal   Judgement Insight:  normal and appropriate  Gait and Station:JOY   Sleep: avg 6-7 hrs (is using an glenroy called [employer])  Appetite: ok        No results found for: NA, K, CL, CO2, BUN, CREATININE, GLUCOSE, CALCIUM, PROT, LABALBU, BILITOT, ALKPHOS, AST, ALT, LABGLOM, GFRAA, AGRATIO, GLOB  No results found for: NA, K, CL, CO2, BUN, CREATININE, GLUCOSE, CALCIUM   No results found for: CHOL  No results found for: TRIG  No results found for: HDL  No results found for: LDLCHOLESTEROL, LDLCALC  No results found for: LABVLDL, VLDL  No results found for: CHOLHDLRATIO  No results found for: LABA1C  No results found for: EAG  No results found for: TSHFT4, TSH  No results found for: VITD25     Assessments Administered:     PHQ9: 9, mild   GAD7: 8, mild       Assessment:    1. Mild episode of recurrent major depressive disorder ([facility] [address]    2. CANDE (generalized anxiety disorder)    3. Insomnia due to other mental disorder    4. Poor concentration    5.  Cannabis use disorder, mild, abuse          Plan:  Continue:  Effexor XR, 225mg, daily  Buspirone, 30mg, twice daily  Amitriptyline, 25mg, nightly  Clonidine, 0.1mg, nightly (for focus, and anxiety)  Melatonin, 3mg up to 10mg, nightly as needed for sleep initiation (hasn't used it for the last couple of weeks and hasn't needed it)     Continue therapy with GALI Gonzalez     Follow up: Return in about 4 months (around 8/12/2021).    If you become suicidal, follow up with this office or call the [facility] at [phone]), or dial [phone].     1. The risks, benefits, side effects, indications, contraindications, and adverse effects of the medications have been discussed. Yes.  2. The pt has verbalized understanding and has capacity to give informed consent. 3. The Wenceslao Walker report has been reviewed according to [facility] regulations. 4. Supportive therapy offered. 5. Follow up:    Return in about 4 months (around 8/12/2021). 6. The patient has been advised to call with any problems. 7. Controlled substance Treatment Plan: none. 8. The above listed medications have been continued, modifications in meds and other orders/labs as follows:                 Encounter Medications         Orders Placed This Encounter   Medications    busPIRone (BUSPAR) 30 MG tablet       Sig: Take 30 mg by mouth 2 times daily       Dispense:  180 tablet       Refill:  0       Fill when due.                     No orders of the defined types were placed in this encounter.        9. Additional comments: She continues to do well on these medications. Will make no changes today and will follow up in about 4 months. .. .     10. Over 50% of the total visit time of 20 minutes was spent on counseling and/or coordination of care of:                         1. Mild episode of recurrent major depressive disorder ([facility] [address]    2. CANDE (generalized anxiety disorder)    3. Insomnia due to other mental disorder    4. Poor concentration    5.  Cannabis use disorder, mild, abuse                        Psychotherapy Topics: mood/medication effectiveness         Gennaro Pollard PMHNP-BC      Instructions       Return in about 4 months (around 8/12/2021). Plan:  Continue:  Effexor XR, 225mg, daily  Buspirone, 30mg, twice daily  Amitriptyline, 25mg, nightly  Clonidine, 0.1mg, nightly (for focus, and anxiety)  Melatonin, 3mg up to 10mg, nightly as needed for sleep initiation (hasn't used it for the last couple of weeks and hasn't needed it)     Continue therapy with GALI Gonzalez     Follow up: Return in about 4 months (around 8/12/2021).    If you become suicidal, follow up with this office or call the [facility] at [phone]), or dial [phone]. After Visit Summary (Printed 4/12/2021)  Additional Documentation    Flowsheets:  Patient Health Questionnaire - 9,   CANDE-7      Encounter Info:  Billing Info,   Allergies,   Detailed Report,   History,   Medications,   Questionnaires      Chart Review Routing History    No routing history on file.   Encounter Status    Signed by Liisa Scheuermann, APRN - NP on 4/12/21 at 13:33   BestPractice Advisories    Click to view BestPractice Advisory history   Encounter Messages    No messages in this encounter   Orders Placed     None  Outpatient Medications at End of Encounter as of 4/12/2021    busPIRone (BUSPAR) 30 MG tablet (Taking) Take 30 mg by mouth 2 times daily   venlafaxine (EFFEXOR XR) 75 MG extended release capsule TAKE 1 CAPSULE BY MOUTH ONCE DAILY (WITH  150  MG  TO  MAKE  225  MG  DOSE)   venlafaxine (EFFEXOR XR) 150 MG extended release capsule Take 1 capsule by mouth once daily   amitriptyline (ELAVIL) 25 MG tablet Take 1 tablet by mouth nightly   cloNIDine (CATAPRES) 0.1 MG tablet Take 1 tablet by mouth nightly   adalimumab (HUMIRA) 40 MG/0.8ML injection Inject 40 mg into the skin every 14 days Indications: Psoriasis   metoprolol tartrate (LOPRESSOR) 25 MG tablet 25 mg nightly    atorvastatin (LIPITOR) 80 MG tablet Take by mouth nightly    BRILINTA 90 MG TABS tablet Take 90 mg by mouth nightly    ondansetron (ZOFRAN-ODT) 4 MG disintegrating tablet Take 4 mg by mouth every 6 hours as needed for Nausea or Vomiting   aspirin 81 MG tablet Take 81 mg by mouth daily   Medication Changes       None     Medication List   Visit Diagnoses       Mild episode of recurrent major depressive disorder (HCC)     CANDE (generalized anxiety disorder)     Insomnia due to other mental disorder     Poor concentration     Cannabis use disorder, mild, abuse     Problem List

## 2021-08-09 ENCOUNTER — TELEPHONE (OUTPATIENT)
Dept: PSYCHIATRY | Age: 41
End: 2021-08-09

## 2021-08-09 ENCOUNTER — VIRTUAL VISIT (OUTPATIENT)
Dept: PSYCHIATRY | Age: 41
End: 2021-08-09
Payer: COMMERCIAL

## 2021-08-09 DIAGNOSIS — F33.0 MILD EPISODE OF RECURRENT MAJOR DEPRESSIVE DISORDER (HCC): Primary | ICD-10-CM

## 2021-08-09 PROCEDURE — 98968 PH1 ASSMT&MGMT NQHP 21-30: CPT | Performed by: SOCIAL WORKER

## 2021-08-09 NOTE — PROGRESS NOTES
Josie Christian is a 36 y.o. female evaluated via telephone on 8/9/2021. Consent:  She and/or health care decision maker is aware that that she may receive a bill for this telephone service, depending on her insurance coverage, and has provided verbal consent to proceed: Yes      Documentation:  I communicated with the patient and/or health care decision maker about see progress note. Details of this discussion including any medical advice provided: see progress note. I affirm this is a Patient Initiated Episode with a Patient who has not had a related appointment within my department in the past 7 days or scheduled within the next 24 hours. Patient identification was verified at the start of the visit: Yes    Total Time: minutes: 21-30 minutes    The visit was conducted pursuant to the emergency declaration under the [address] [place] [address], [address] [place] [address] authority and the [place] [employer] [place] [employer] Act. Patient identification was verified, and a caregiver was present when appropriate. The patient was located in a state where the provider was credentialed to provide care. Note: not billable if this call serves to triage the patient into an appointment for the relevant concern      PATRICIO Nagy     Therapy Progress Note  Ollie PETERS LCSW  8/9/2021  2:30 PM  3:30 PM    Patient location  :Home  [facility] location : [address] [place] [address]      Time spent with Patient: 60 minutes  This is patient's 19th  Therapy appointment. Reason for Consult:  depression and anxiety  Referring Provider: No referring provider defined for this encounter. Josie Christian ,a 36 y.o. female, for initial evaluation visit.      Pt provided informed consent for the behavioral health program. Discussed with patient model of service to include the limits of confidentiality (i.e. abuse reporting, suicide intervention, etc.) and short-term intervention focused Substance and Sexual Activity    Alcohol use: Yes     Alcohol/week: 2.0 standard drinks     Types: 1 Glasses of wine, 1 Shots of liquor per week     Comment: \"I used to drink a lot. I drink 2 to 3 drinks a month now\".  Drug use: Yes     Types: Marijuana     Comment: uses 9/4/20 marijuana every day     Sexual activity: Not Currently     Comment: 9/4/20 no birth control, not sexually active   Other Topics Concern    Not on file   Social History Narrative    PREVIOUS MEDICATION TRIALS    Ambien    Effexor XR (current, 150mg)    Prozac (several years, 40mg, doesn't remember why she stopped)    Elavil    Zoloft (several years, doesn't remember the dose, stopped taking because she was pregnant)    Xanax         No negative effect with taking SSRI's     Social Determinants of Health     Financial Resource Strain: Low Risk     Difficulty of Paying Living Expenses: Not hard at all   Food Insecurity: No Food Insecurity    Worried About Running Out of Food in the Last Year: Never true    Yesy of Food in the Last Year: Never true   Transportation Needs: No Transportation Needs    Lack of Transportation (Medical): No    Lack of Transportation (Non-Medical):  No   Physical Activity:     Days of Exercise per Week:     Minutes of Exercise per Session:    Stress: No Stress Concern Present    Feeling of Stress : Not at all   Social Connections:     Frequency of Communication with Friends and Family:     Frequency of Social Gatherings with Friends and Family:     Attends Yarsanism Services:     Active Member of Clubs or Organizations:     Attends Club or Organization Meetings:     Marital Status:    Intimate Partner Violence: Not At Risk    Fear of Current or Ex-Partner: No    Emotionally Abused: No    Physically Abused: No    Sexually Abused: No       Medications:   Current Outpatient Medications   Medication Sig Dispense Refill    busPIRone (BUSPAR) 30 MG tablet Take 1 tablet by mouth twice daily 180 tablet 0    amitriptyline (ELAVIL) 25 MG tablet Take 1 tablet by mouth nightly 90 tablet 0    cloNIDine (CATAPRES) 0.1 MG tablet Take 1 tablet by mouth nightly 90 tablet 0    venlafaxine (EFFEXOR XR) 75 MG extended release capsule TAKE 1 CAPSULE BY MOUTH ONCE DAILY (WITH  150  MG  TO  MAKE  225  MG  DOSE) 90 capsule 0    venlafaxine (EFFEXOR XR) 150 MG extended release capsule Take 1 capsule by mouth once daily 90 capsule 0    adalimumab (HUMIRA) 40 MG/0.8ML injection Inject 40 mg into the skin every 14 days Indications: Psoriasis      metoprolol tartrate (LOPRESSOR) 25 MG tablet 25 mg nightly   11    atorvastatin (LIPITOR) 80 MG tablet Take by mouth nightly   11    BRILINTA 90 MG TABS tablet Take 90 mg by mouth nightly   11    ondansetron (ZOFRAN-ODT) 4 MG disintegrating tablet Take 4 mg by mouth every 6 hours as needed for Nausea or Vomiting      aspirin 81 MG tablet Take 81 mg by mouth daily       No current facility-administered medications for this visit. Social History:   Social History     Socioeconomic History    Marital status:      Spouse name: Not on file    Number of children: 2    Years of education: assoc degree    Highest education level: Not on file   Occupational History    Not on file   Tobacco Use    Smoking status: Current Every Day Smoker     Packs/day: 1.00     Start date: 56    Smokeless tobacco: Never Used    Tobacco comment: 6/5/19 pt given in on smoking and smoking cessation. Vaping Use    Vaping Use: Former    Quit date: 10/15/2019    Substances: Never   Substance and Sexual Activity    Alcohol use: Yes     Alcohol/week: 2.0 standard drinks     Types: 1 Glasses of wine, 1 Shots of liquor per week     Comment: \"I used to drink a lot. I drink 2 to 3 drinks a month now\".     Drug use: Yes     Types: Marijuana     Comment: uses 9/4/20 marijuana every day     Sexual activity: Not Currently     Comment: 9/4/20 no birth control, not sexually active Other Topics Concern    Not on file   Social History Narrative    PREVIOUS MEDICATION TRIALS    Ambien    Effexor XR (current, 150mg)    Prozac (several years, 40mg, doesn't remember why she stopped)    Elavil    Zoloft (several years, doesn't remember the dose, stopped taking because she was pregnant)    Xanax         No negative effect with taking SSRI's     Social Determinants of Health     Financial Resource Strain: Low Risk     Difficulty of Paying Living Expenses: Not hard at all   Food Insecurity: No Food Insecurity    Worried About Running Out of Food in the Last Year: Never true    [address] in the Last Year: Never true   Transportation Needs: No Transportation Needs    Lack of Transportation (Medical): No    Lack of Transportation (Non-Medical): No   Physical Activity:     Days of Exercise per Week:     Minutes of Exercise per Session:    Stress: No Stress Concern Present    Feeling of Stress : Not at all   Social Connections:     Frequency of Communication with Friends and Family:     Frequency of Social Gatherings with Friends and Family:     Attends Pentecostal Services:     Active Member of Clubs or Organizations:     Attends Club or Organization Meetings:     Marital Status:    Intimate Partner Violence: Not At Risk    Fear of Current or Ex-Partner: No    Emotionally Abused: No    Physically Abused: No    Sexually Abused: No       TOBACCO:   reports that she has been smoking. She started smoking about 25 years ago. She has been smoking about 1.00 pack per day. She has never used smokeless tobacco.  ETOH:   reports current alcohol use of about 2.0 standard drinks of alcohol per week.     Family History:   Family History   Problem Relation Age of Onset    High Blood Pressure Mother     Depression Mother     Anxiety Disorder Mother     Heart Attack Father     Heart Disease Father     High Cholesterol Father        Diagnosis:    The encounter diagnosis was Mild episode of recurrent major depressive disorder ([facility] [address] Diagnosis Date    CAD (coronary artery disease)     MI about 1 1/2 yrs ago    CHF (congestive heart failure) ([facility] [address]     Hyperlipidemia     Hypertension     kidney stones     VINOD (obstructive sleep apnea)     uses CPAP    Psoriasis     Psychiatric problem     hx of depression and anxiety since a teenager. Other psychosocial and environmental problems    Plan:  1. Continue medication management  2. CBT to target cognitive distortions  3. Discuss therapeutic goals  A.  Setting boundaries   B. Assertive Communications    Pt interventions:  Trained in strategies for increasing balanced thinking, Provided education, Discussed self-care (sleep, nutrition, rewarding activities, social support, exercise), [place]-setting to identify pt's primary goals for JAYMIE SANDS [facility] visit / overall health, Supportive techniques, Emphasized self-care as important for managing overall health and Identified maladaptive thoughts      Karla Schuster MSW, LCSW

## 2021-08-09 NOTE — PATIENT INSTRUCTIONS
The goal of distress tolerance is to survive a crisis. Some of these skills might make you feel \"better,\" but the goal of them is to prevent you from feeling worse. Stop  Take a step back  Observe  Proceed Mindfully    TIP:  Will activate the parasympathetic nervous system    Reduce Temperature of face with cold water for 30-60 seconds (can also put ice cube on your face). This activates the \"dive reflex\" which lowers your heart rate. Intense exercise: do aerobic exercise for 20 minutes      Paced Breathin-6 breaths per minute--make exhales longer than inhales      Progressive muscle relaxation can be paired with paced breathing      The wise mind \"ACCEPTS\"    Activities: engage in activities that are neutral or opposite to crisis behaviors  Contributing:  Focus on someone else's wellbeing to distract yourself from the crisis (write a letter or note to someone)  Comparison:  Gives you perspective. Try to focus on a time when you had a problem that you longer have, or think about others who are suffering.    Emotions:  Engage in activities that are likely to generate emotions that are different from those related to the crisis (watch a scary movie)  Push away: block the crisis from your mind for a short period of time (I'll think about this in 30 minutes)  Thoughts:  Engage in mental activities that distract yourself from thinking about the crisis (recite times tables, memorize a poem, make a list of all the flowers you can name, as many words as you can think of that start with a particular letter in one minute)  Sensations:  Attend to intense sensations that are not related to your distress or crisis (hold ice cube and let it melt)

## 2021-08-09 NOTE — TELEPHONE ENCOUNTER
Pt was called for virtual appointment check in.       Electronically signed by Hardy Padilla MA on 8/9/2021 at 2:15 PM
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
## 2021-08-11 ENCOUNTER — TELEPHONE (OUTPATIENT)
Dept: PSYCHIATRY | Age: 41
End: 2021-08-11

## 2021-08-11 NOTE — TELEPHONE ENCOUNTER
Called pt for appointment reminder.     -Pt confirmed      Electronically signed by Valdez Magaña MA on 8/11/2021 at 10:35 AM

## 2021-08-12 ENCOUNTER — VIRTUAL VISIT (OUTPATIENT)
Dept: PSYCHIATRY | Age: 41
End: 2021-08-12
Payer: COMMERCIAL

## 2021-08-12 DIAGNOSIS — F33.0 MILD EPISODE OF RECURRENT MAJOR DEPRESSIVE DISORDER (HCC): Primary | ICD-10-CM

## 2021-08-12 DIAGNOSIS — F99 INSOMNIA DUE TO OTHER MENTAL DISORDER: ICD-10-CM

## 2021-08-12 DIAGNOSIS — F12.10 CANNABIS USE DISORDER, MILD, ABUSE: ICD-10-CM

## 2021-08-12 DIAGNOSIS — F41.1 GAD (GENERALIZED ANXIETY DISORDER): ICD-10-CM

## 2021-08-12 DIAGNOSIS — F51.05 INSOMNIA DUE TO OTHER MENTAL DISORDER: ICD-10-CM

## 2021-08-12 DIAGNOSIS — R41.840 POOR CONCENTRATION: ICD-10-CM

## 2021-08-12 PROCEDURE — 99443 PR PHYS/QHP TELEPHONE EVALUATION 21-30 MIN: CPT | Performed by: NURSE PRACTITIONER

## 2021-08-12 RX ORDER — VENLAFAXINE HYDROCHLORIDE 150 MG/1
CAPSULE, EXTENDED RELEASE ORAL
Qty: 90 CAPSULE | Refills: 0 | Status: SHIPPED | OUTPATIENT
Start: 2021-08-12 | End: 2021-11-08 | Stop reason: SDUPTHER

## 2021-08-12 RX ORDER — VENLAFAXINE HYDROCHLORIDE 75 MG/1
CAPSULE, EXTENDED RELEASE ORAL
Qty: 90 CAPSULE | Refills: 0 | Status: SHIPPED | OUTPATIENT
Start: 2021-08-12 | End: 2021-11-08 | Stop reason: SDUPTHER

## 2021-08-12 RX ORDER — CLONIDINE HYDROCHLORIDE 0.1 MG/1
0.1 TABLET ORAL NIGHTLY
Qty: 90 TABLET | Refills: 0 | Status: SHIPPED | OUTPATIENT
Start: 2021-08-12 | End: 2021-11-05 | Stop reason: SDUPTHER
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
## 2021-08-12 NOTE — PATIENT INSTRUCTIONS
Plan:  Continue:  Effexor XR, 225mg, daily  Buspirone, 30mg, twice daily  Amitriptyline, 25mg, nightly  Clonidine, 0.1mg, nightly (for focus, and anxiety)  Melatonin, 3mg up to 10mg, nightly as needed for sleep initiation (hasn't used it for the last couple of weeks and hasn't needed it)    Continue therapy with GALI Gonzalez    Follow up: Return in about 6 months (around 2/12/2022). If you become suicidal, follow up with this office or call the [facility] at [phone]), or dial [phone]. I want you to know that I have enjoyed working with you and have enjoyed my time here in [place]. I have found the people in [place] very kind people, very patient and polite. I will miss you as I return to [place]. This was a personal decision as I want to spend my assisted years closer to my sons who live in [place]. I wish you the very best. Blessings to you and your daughter as she recovers. I know that you will be a good support for her as she journeys through recovery.

## 2021-08-25 ENCOUNTER — VIRTUAL VISIT (OUTPATIENT)
Dept: PSYCHIATRY | Age: 41
End: 2021-08-25
Payer: COMMERCIAL

## 2021-08-25 ENCOUNTER — TELEPHONE (OUTPATIENT)
Dept: PSYCHIATRY | Age: 41
End: 2021-08-25

## 2021-08-25 DIAGNOSIS — F33.0 MILD EPISODE OF RECURRENT MAJOR DEPRESSIVE DISORDER (HCC): Primary | ICD-10-CM

## 2021-08-25 PROCEDURE — 98968 PH1 ASSMT&MGMT NQHP 21-30: CPT | Performed by: SOCIAL WORKER

## 2021-08-25 NOTE — TELEPHONE ENCOUNTER
Pt was called for virtual appointment check in.       Electronically signed by Mary Mcdonald MA on 8/25/2021 at 1:06 PM

## 2021-08-25 NOTE — PATIENT INSTRUCTIONS
The Best Possible Self  The Best Possible Self (BPS) exercise can be used to increase optimism. The BPS requires people to envision themselves in an imaginary future in which everything has turned out in the most optimal way. Over the past years, writing about and imagining a BPS has repeatedly been demonstrated to increase peoples mood and well-being Marium De Anda, 2001; Sharona Hennessy al., 2010; Ko Mcduffie, 2006). Mina Waldron et al. (2010) provided evidence that writing about and imagining a BPS can also increase optimism in terms of expecting favorable outcomes. This effect was independent of the effect on mood that was simultaneously increased by the manipulation. Goal  The BPS exercise can be used to increase optimism in terms of expecting favorable outcomes (see for instance Janak et al., 2011). Advice  While in most cases the exercise is used in a written form, it is also possible to ask clients to make drawings of their best possible self. The most powerful way to use the exercise is by instructing clients to visualize their best possible self on a daily basis. Tool Description  1. Set a timer or stopwatch for 10 minutes, during this time you are to think about your best possible future self and to write it down on paper. 2. Imagine your life the way you always imagined it would be like, your best possible self. Picture that you have performed to the best of your abilities and you had achieved the things you wanted to in life. 3. While writing, dont worry about grammar or punctuation just focus on writing all your thoughts and emotions in an expressive way. You may want to have several sheets of paper for this exercise. 4. Reflection: after completing the initial exercise, you must reflect on your feelings and answer. Think about the following questions: What effects did this exercise have?  Does this exercise affect you more emotionally or does it affect your current self-image?    Did it motivate or inspire you?  Does it make you want to make changes?  How did this exercise affect you overall?

## 2021-08-25 NOTE — PROGRESS NOTES
Yaw Mcgee is a 36 y.o. female evaluated via telephone on 8/25/2021. Consent:  She and/or health care decision maker is aware that that she may receive a bill for this telephone service, depending on her insurance coverage, and has provided verbal consent to proceed: Yes      Documentation:  I communicated with the patient and/or health care decision maker about see progress note. Details of this discussion including any medical advice provided: see progress note. I affirm this is a Patient Initiated Episode with a Patient who has not had a related appointment within my department in the past 7 days or scheduled within the next 24 hours. Patient identification was verified at the start of the visit: Yes    Total Time: minutes: 21-30 minutes    The visit was conducted pursuant to the emergency declaration under the [address] [place] [address], [address] [place] [address] authority and the [employer] and [place] General Act. Patient identification was verified, and a caregiver was present when appropriate. The patient was located in a state where the provider was credentialed to provide care. Note: not billable if this call serves to triage the patient into an appointment for the relevant concern      PATRICIO Echevarria     Therapy Progress Note  Marcel PETERS LCSW  8/25/2021  1:06 PM  1:38 PM    Patient location  : Home  LCSW location : [address] [place] [address]      Time spent with Patient: 32 minutes  This is patient's 20th  Therapy appointment. Reason for Consult:  depression and anxiety  Referring Provider: No referring provider defined for this encounter. Yaw Mcgee ,a 36 y.o. female, for initial evaluation visit.      Pt provided informed consent for the behavioral health program. Discussed with patient model of service to include the limits of confidentiality (i.e. abuse reporting, suicide intervention, etc.) and short-term intervention focused approach. Discussed no show and late cancellation policy. Pt indicated understanding. S:  Pt reported her daughter has been discharged from the hospital and is following up with outpatient therapy. Pt shared about what happened since last session and how well pt was able to handle the stressors. Pt did not verbalize distress when informed provider is leaving the office. Pt reported she would like to continue the 6 weeks appointments with other provider and verbalized she would call sooner if needed. Pt denies Suicidal Ideations, Homicidal Ideation, Auditory Hallucinations, Visual Hallucinations, Tactical Hallucinations. MSE:    Sounded   alert, cooperative, no distress  Appetite normal  Sleep disturbance No  Fatigue No  Loss of pleasure No  Impulsive behavior No  Speech    normal rate and normal volume  Mood    Stable    Thought Content    intact  Thought Process    goal directed  Associations    logical connections  Insight    Fair  Judgment    Intact  Orientation    oriented to person, place, time, and general circumstances  Memory    recent and remote memory intact  Attention/Concentration    intact  Morbid ideation No  Suicide Assessment    no suicidal ideation      History:  Social History     Socioeconomic History    Marital status:      Spouse name: Not on file    Number of children: 2    Years of education: assoc degree    Highest education level: Not on file   Occupational History    Not on file   Tobacco Use    Smoking status: Current Every Day Smoker     Packs/day: 1.00     Start date: 56    Smokeless tobacco: Never Used    Tobacco comment: 6/5/19 pt given in on smoking and smoking cessation. Vaping Use    Vaping Use: Former    Quit date: 10/15/2019    Substances: Never   Substance and Sexual Activity    Alcohol use: Yes     Alcohol/week: 2.0 standard drinks     Types: 1 Glasses of wine, 1 Shots of liquor per week     Comment: \"I used to drink a lot.   I drink 2 to 3 drinks a month now\".  Drug use: Yes     Types: Marijuana     Comment: uses 9/4/20 marijuana every day     Sexual activity: Not Currently     Comment: 9/4/20 no birth control, not sexually active   Other Topics Concern    Not on file   Social History Narrative    PREVIOUS MEDICATION TRIALS    Ambien    Effexor XR (current, 150mg)    Prozac (several years, 40mg, doesn't remember why she stopped)    Elavil    Zoloft (several years, doesn't remember the dose, stopped taking because she was pregnant)    Xanax         No negative effect with taking SSRI's     Social Determinants of Health     Financial Resource Strain: Low Risk     Difficulty of Paying Living Expenses: Not hard at all   Food Insecurity: No Food Insecurity    Worried About Running Out of Food in the Last Year: Never true    Yesy of Food in the Last Year: Never true   Transportation Needs: No Transportation Needs    Lack of Transportation (Medical): No    Lack of Transportation (Non-Medical):  No   Physical Activity:     Days of Exercise per Week:     Minutes of Exercise per Session:    Stress: No Stress Concern Present    Feeling of Stress : Not at all   Social Connections:     Frequency of Communication with Friends and Family:     Frequency of Social Gatherings with Friends and Family:     Attends Christianity Services:     Active Member of Clubs or Organizations:     Attends Club or Organization Meetings:     Marital Status:    Intimate Partner Violence: Not At Risk    Fear of Current or Ex-Partner: No    Emotionally Abused: No    Physically Abused: No    Sexually Abused: No       Medications:   Current Outpatient Medications   Medication Sig Dispense Refill    venlafaxine (EFFEXOR XR) 75 MG extended release capsule TAKE 1 CAPSULE BY MOUTH ONCE DAILY (WITH  150  MG  TO  MAKE  225  MG  DOSE) 90 capsule 0    venlafaxine (EFFEXOR XR) 150 MG extended release capsule Take 1 capsule by mouth once daily (with 75mg for a 225mg dose) 90 capsule 0    cloNIDine (CATAPRES) 0.1 MG tablet Take 1 tablet by mouth nightly 90 tablet 0    busPIRone (BUSPAR) 30 MG tablet Take 1 tablet by mouth twice daily 180 tablet 0    amitriptyline (ELAVIL) 25 MG tablet Take 1 tablet by mouth nightly 90 tablet 0    adalimumab (HUMIRA) 40 MG/0.8ML injection Inject 40 mg into the skin every 14 days Indications: Psoriasis      metoprolol tartrate (LOPRESSOR) 25 MG tablet 25 mg nightly   11    atorvastatin (LIPITOR) 80 MG tablet Take by mouth nightly   11    BRILINTA 90 MG TABS tablet Take 90 mg by mouth nightly   11    ondansetron (ZOFRAN-ODT) 4 MG disintegrating tablet Take 4 mg by mouth every 6 hours as needed for Nausea or Vomiting      aspirin 81 MG tablet Take 81 mg by mouth daily       No current facility-administered medications for this visit. Social History:   Social History     Socioeconomic History    Marital status:      Spouse name: Not on file    Number of children: 2    Years of education: assoc degree    Highest education level: Not on file   Occupational History    Not on file   Tobacco Use    Smoking status: Current Every Day Smoker     Packs/day: 1.00     Start date: 56    Smokeless tobacco: Never Used    Tobacco comment: 6/5/19 pt given in on smoking and smoking cessation. Vaping Use    Vaping Use: Former    Quit date: 10/15/2019    Substances: Never   Substance and Sexual Activity    Alcohol use: Yes     Alcohol/week: 2.0 standard drinks     Types: 1 Glasses of wine, 1 Shots of liquor per week     Comment: \"I used to drink a lot. I drink 2 to 3 drinks a month now\".     Drug use: Yes     Types: Marijuana     Comment: uses 9/4/20 marijuana every day     Sexual activity: Not Currently     Comment: 9/4/20 no birth control, not sexually active   Other Topics Concern    Not on file   Social History Narrative    PREVIOUS MEDICATION TRIALS    Ambien    Effexor XR (current, 150mg)    Prozac (several years, 40mg, doesn't remember why she stopped)    Elavil    Zoloft (several years, doesn't remember the dose, stopped taking because she was pregnant)    Xanax         No negative effect with taking SSRI's     Social Determinants of Health     Financial Resource Strain: Low Risk     Difficulty of Paying Living Expenses: Not hard at all   Food Insecurity: No Food Insecurity    Worried About Running Out of Food in the Last Year: Never true    [address] in the Last Year: Never true   Transportation Needs: No Transportation Needs    Lack of Transportation (Medical): No    Lack of Transportation (Non-Medical): No   Physical Activity:     Days of Exercise per Week:     Minutes of Exercise per Session:    Stress: No Stress Concern Present    Feeling of Stress : Not at all   Social Connections:     Frequency of Communication with Friends and Family:     Frequency of Social Gatherings with Friends and Family:     Attends Yazidism Services:     Active Member of Clubs or Organizations:     Attends Club or Organization Meetings:     Marital Status:    Intimate Partner Violence: Not At Risk    Fear of Current or Ex-Partner: No    Emotionally Abused: No    Physically Abused: No    Sexually Abused: No       TOBACCO:   reports that she has been smoking. She started smoking about 25 years ago. She has been smoking about 1.00 pack per day. She has never used smokeless tobacco.  ETOH:   reports current alcohol use of about 2.0 standard drinks of alcohol per week. Family History:   Family History   Problem Relation Age of Onset    High Blood Pressure Mother     Depression Mother     Anxiety Disorder Mother     Heart Attack Father     Heart Disease Father     High Cholesterol Father        Diagnosis:    The encounter diagnosis was Mild episode [facility]        Diagnosis Date    CAD (coronary artery disease)     MI about 1 1/2 yrs ago    CHF (congestive heart failure) ([facility]     Hyperlipidemia     Hypertension     kidney stones     VINOD (obstructive sleep apnea)     uses CPAP    Psoriasis     Psychiatric problem     hx of depression and anxiety since a teenager. Other psychosocial and environmental problems    Plan:  1. Continue medication management  2. CBT to target cognitive distortions  3. Discuss therapeutic goals  A.  Setting boundaries   B. Assertive Communications    Pt interventions:  Trained in strategies for increasing balanced thinking, Provided education, Discussed self-care (sleep, nutrition, rewarding activities, social support, exercise), Council-setting to identify pt's primary goals for [facility] visit / overall health, Supportive techniques, Emphasized self-care as important for managing overall health and Identified maladaptive thoughts      Inell Early MSW, LCSW

## 2021-09-13 RX ORDER — ATORVASTATIN CALCIUM 80 MG/1
TABLET, FILM COATED ORAL
Qty: 90 TABLET | Refills: 3 | Status: SHIPPED | OUTPATIENT
Start: 2021-09-13 | End: 2022-09-09

## 2021-10-06 ENCOUNTER — TELEPHONE (OUTPATIENT)
Dept: PSYCHIATRY | Age: 41
End: 2021-10-06

## 2021-10-06 NOTE — TELEPHONE ENCOUNTER
Called pt for appointment reminder.   -left voicemail, requesting a return call      Electronically signed by Reji Milligan MA on 10/6/2021 at 2:16 PM

## 2021-11-05 ENCOUNTER — TELEPHONE (OUTPATIENT)
Dept: PSYCHIATRY | Age: 41
End: 2021-11-05

## 2021-11-05 RX ORDER — CLONIDINE HYDROCHLORIDE 0.1 MG/1
0.1 TABLET ORAL NIGHTLY
Qty: 90 TABLET | Refills: 0 | Status: SHIPPED | OUTPATIENT
Start: 2021-11-05 | End: 2022-01-31
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
## 2021-11-08 ENCOUNTER — TELEPHONE (OUTPATIENT)
Dept: PSYCHIATRY | Age: 41
End: 2021-11-08

## 2021-11-08 RX ORDER — AMITRIPTYLINE HYDROCHLORIDE 25 MG/1
25 TABLET, FILM COATED ORAL NIGHTLY
Qty: 90 TABLET | Refills: 0 | Status: SHIPPED | OUTPATIENT
Start: 2021-11-08 | End: 2022-01-31

## 2021-11-08 RX ORDER — VENLAFAXINE HYDROCHLORIDE 150 MG/1
CAPSULE, EXTENDED RELEASE ORAL
Qty: 90 CAPSULE | Refills: 0 | Status: SHIPPED | OUTPATIENT
Start: 2021-11-08 | End: 2022-01-31

## 2021-11-08 RX ORDER — VENLAFAXINE HYDROCHLORIDE 75 MG/1
CAPSULE, EXTENDED RELEASE ORAL
Qty: 90 CAPSULE | Refills: 0 | Status: SHIPPED | OUTPATIENT
Start: 2021-11-08 | End: 2022-01-31

## 2021-11-08 RX ORDER — BUSPIRONE HYDROCHLORIDE 30 MG/1
TABLET ORAL
Qty: 180 TABLET | Refills: 0 | Status: SHIPPED | OUTPATIENT
Start: 2021-11-08 | End: 2022-01-31

## 2021-11-08 NOTE — TELEPHONE ENCOUNTER
Pt made aware that the refill RXs that she requested have been sent to her pharmacy per Abida DILLARD.
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
## 2022-01-31 ENCOUNTER — TELEPHONE (OUTPATIENT)
Dept: PSYCHIATRY | Age: 42
End: 2022-01-31

## 2022-01-31 RX ORDER — VENLAFAXINE HYDROCHLORIDE 75 MG/1
CAPSULE, EXTENDED RELEASE ORAL
Qty: 90 CAPSULE | Refills: 0 | Status: SHIPPED | OUTPATIENT
Start: 2022-01-31 | End: 2022-07-11

## 2022-01-31 RX ORDER — VENLAFAXINE HYDROCHLORIDE 150 MG/1
CAPSULE, EXTENDED RELEASE ORAL
Qty: 90 CAPSULE | Refills: 0 | Status: SHIPPED | OUTPATIENT
Start: 2022-01-31 | End: 2022-07-11

## 2022-01-31 RX ORDER — BUSPIRONE HYDROCHLORIDE 30 MG/1
TABLET ORAL
Qty: 60 TABLET | Refills: 0 | Status: SHIPPED | OUTPATIENT
Start: 2022-01-31 | End: 2022-03-11

## 2022-01-31 RX ORDER — AMITRIPTYLINE HYDROCHLORIDE 25 MG/1
25 TABLET, FILM COATED ORAL NIGHTLY
Qty: 90 TABLET | Refills: 0 | Status: SHIPPED | OUTPATIENT
Start: 2022-01-31 | End: 2022-03-11

## 2022-01-31 RX ORDER — CLONIDINE HYDROCHLORIDE 0.1 MG/1
0.1 TABLET ORAL NIGHTLY
Qty: 90 TABLET | Refills: 0 | Status: SHIPPED | OUTPATIENT
Start: 2022-01-31 | End: 2022-03-11

## 2022-01-31 NOTE — TELEPHONE ENCOUNTER
Called and let pt know that her scripts was sent to her pharmacy     Electronically signed by Cassia Singer on 1/31/2022 at 11:07 AM

## 2022-01-31 NOTE — TELEPHONE ENCOUNTER
Pharmacy sent a request to refill pt medication. Last office visit : 8/25/2021  Darío Clemens MSW  Next office visit : 2/14/2022 Dannie Lopezcaitlin STRONGJENNI    Requested Prescriptions     Pending Prescriptions Disp Refills    venlafaxine (EFFEXOR XR) 75 MG extended release capsule [Pharmacy Med Name: Venlafaxine HCl ER 75 MG Oral Capsule Extended Release 24 Hour] 90 capsule 0     Sig: TAKE 1 CAPSULE BY MOUTH ONCE DAILY ALONG  (WITH  150  MG  TO  MAKE  225  MG  DOSE)    busPIRone (BUSPAR) 30 MG tablet [Pharmacy Med Name: BUSPIRONE 30MG  TAB] 60 tablet 0     Sig: Take 1 tablet by mouth twice daily    venlafaxine (EFFEXOR XR) 150 MG extended release capsule [Pharmacy Med Name: Venlafaxine HCl  MG Oral Capsule Extended Release 24 Hour] 90 capsule 0     Sig: TAKE 1 CAPSULE BY MOUTH ONCE DAILY (WITH  75MG  FOR  A  225  MG  DOSE)    amitriptyline (ELAVIL) 25 MG tablet [Pharmacy Med Name: Amitriptyline HCl 25 MG Oral Tablet] 90 tablet 0     Sig: Take 1 tablet by mouth nightly    cloNIDine (CATAPRES) 0.1 MG tablet [Pharmacy Med Name: CLONIDINE 0.1MG     TAB] 90 tablet 0     Sig: Take 1 tablet by mouth nightly            Shameca Dukes              8/25/2021  [address]    Licensed Clinical   Mild episode of recurrent major depressive disorder [facility])    Dx  Follow-up    Reason for Visit       Progress Notes  PATRICIO Lord ()   Licensed Clinical   Expand All Collapse All  Denece Valeri is a 36 y.o. female evaluated via telephone on 8/25/2021.       Consent:  She and/or health care decision maker is aware that that she may receive a bill for this telephone service, depending on her insurance coverage, and has provided verbal consent to proceed: Yes        Documentation:  I communicated with the patient and/or health care decision maker about see progress note.    Details of this discussion including any medical advice provided: see progress note.        I affirm this is a Patient Initiated Episode with a Patient who has not had a related appointment within my department in the past 7 days or scheduled within the next 24 hours.     Patient identification was verified at the start of the visit: Yes     Total Time: minutes: 21-30 minutes     The visit was conducted pursuant to the emergency declaration under the [facility], [address] [place] [address] and the [employer] Act. Patient identification was verified, and a caregiver was present when appropriate. The patient was located in a state where the provider was credentialed to provide care.     Note: not billable if this call serves to triage the patient into an appointment for the relevant concern        PATRICIO Matias      Therapy Progress Note  Brandt PETERS LCSW  8/25/2021  1:06 PM  1:38 PM     Patient location  : Home  LCS location : [address] [place] [address]        Time spent with Patient: 32 minutes  This is patient's 20th  Therapy appointment. Reason for Consult:  depression and anxiety  Referring Provider: No referring provider defined for this encounter.     Eloina Andres ,a 36 y.o. female, for initial evaluation visit. Pt provided informed consent for the behavioral health program. Discussed with patient model of service to include the limits of confidentiality (i.e. abuse reporting, suicide intervention, etc.) and short-term intervention focused approach. Discussed no show and late cancellation policy. Pt indicated understanding.     S:  Pt reported her daughter has been discharged from the hospital and is following up with outpatient therapy. Pt shared about what happened since last session and how well pt was able to handle the stressors. Pt did not verbalize distress when informed provider is leaving the office.    Pt reported she would like to continue the 6 weeks appointments with other provider and verbalized she would call sooner if needed. Pt denies Suicidal Ideations, Homicidal Ideation, Auditory Hallucinations, Visual Hallucinations, Tactical Hallucinations.     MSE:     Sounded   alert, cooperative, no distress  Appetite normal  Sleep disturbance No  Fatigue No  Loss of pleasure No  Impulsive behavior No  Speech    normal rate and normal volume  Mood    Stable     Thought Content    intact  Thought Process    goal directed  Associations    logical connections  Insight    Fair  Judgment    Intact  Orientation    oriented to person, place, time, and general circumstances  Memory    recent and remote memory intact  Attention/Concentration    intact  Morbid ideation No  Suicide Assessment    no suicidal ideation        History:  Social History   Social History            Socioeconomic History    Marital status:        Spouse name: Not on file    Number of children: 2    Years of education: assoc degree    Highest education level: Not on file   Occupational History    Not on file   Tobacco Use    Smoking status: Current Every Day Smoker       Packs/day: 1.00       Start date: 56    Smokeless tobacco: Never Used    Tobacco comment: 6/5/19 pt given in on smoking and smoking cessation. Vaping Use    Vaping Use: Former    Quit date: 10/15/2019    Substances: Never   Substance and Sexual Activity    Alcohol use: Yes       Alcohol/week: 2.0 standard drinks       Types: 1 Glasses of wine, 1 Shots of liquor per week       Comment: \"I used to drink a lot. I drink 2 to 3 drinks a month now\".  Drug use:  Yes       Types: Marijuana       Comment: uses 9/4/20 marijuana every day     Sexual activity: Not Currently       Comment: 9/4/20 no birth control, not sexually active   Other Topics Concern    Not on file   Social History Narrative     PREVIOUS MEDICATION TRIALS     Ambien     Effexor XR (current, 150mg)     Prozac (several years, 40mg, doesn't remember why she stopped)     Elavil     Zoloft (several years, doesn't remember the dose, stopped taking because she was pregnant)     Xanax           No negative effect with taking SSRI's      Social Determinants of Health          Financial Resource Strain: Low Risk     Difficulty of Paying Living Expenses: Not hard at all   Food Insecurity: No Food Insecurity    Worried About Running Out of Food in the Last Year: Never true    Yesy of Food in the Last Year: Never true   Transportation Needs: No Transportation Needs    Lack of Transportation (Medical): No    Lack of Transportation (Non-Medical): No   Physical Activity:     Days of Exercise per Week:     Minutes of Exercise per Session:    Stress: No Stress Concern Present    Feeling of Stress : Not at all   Social Connections:     Frequency of Communication with Friends and Family:     Frequency of Social Gatherings with Friends and Family:     Attends Confucianist Services:     Active Member of Clubs or Organizations:     Attends Club or Organization Meetings:     Marital Status:    Intimate Partner Violence: Not At Risk    Fear of Current or Ex-Partner: No    Emotionally Abused: No    Physically Abused: No    Sexually Abused:  No            Medications:   Current Facility-Administered Medications          Current Outpatient Medications   Medication Sig Dispense Refill    venlafaxine (EFFEXOR XR) 75 MG extended release capsule TAKE 1 CAPSULE BY MOUTH ONCE DAILY (WITH  150  MG  TO  MAKE  225  MG  DOSE) 90 capsule 0    venlafaxine (EFFEXOR XR) 150 MG extended release capsule Take 1 capsule by mouth once daily (with 75mg for a 225mg dose) 90 capsule 0    cloNIDine (CATAPRES) 0.1 MG tablet Take 1 tablet by mouth nightly 90 tablet 0    busPIRone (BUSPAR) 30 MG tablet Take 1 tablet by mouth twice daily 180 tablet 0    amitriptyline (ELAVIL) 25 MG tablet Take 1 tablet by mouth nightly 90 tablet 0    adalimumab (HUMIRA) 40 MG/0.8ML injection Inject 40 mg into the skin every 14 days Indications: Psoriasis        metoprolol tartrate (LOPRESSOR) 25 MG tablet 25 mg nightly    11    atorvastatin (LIPITOR) 80 MG tablet Take by mouth nightly    11    BRILINTA 90 MG TABS tablet Take 90 mg by mouth nightly    11    ondansetron (ZOFRAN-ODT) 4 MG disintegrating tablet Take 4 mg by mouth every 6 hours as needed for Nausea or Vomiting        aspirin 81 MG tablet Take 81 mg by mouth daily          No current facility-administered medications for this visit.            Social History:   Social History               Socioeconomic History    Marital status:        Spouse name: Not on file    Number of children: 2    Years of education: assoc degree    Highest education level: Not on file   Occupational History    Not on file   Tobacco Use    Smoking status: Current Every Day Smoker       Packs/day: 1.00       Start date: 56    Smokeless tobacco: Never Used    Tobacco comment: 6/5/19 pt given in on smoking and smoking cessation. Vaping Use    Vaping Use: Former    Quit date: 10/15/2019    Substances: Never   Substance and Sexual Activity    Alcohol use: Yes       Alcohol/week: 2.0 standard drinks       Types: 1 Glasses of wine, 1 Shots of liquor per week       Comment: \"I used to drink a lot. I drink 2 to 3 drinks a month now\".  Drug use:  Yes       Types: Marijuana       Comment: uses 9/4/20 marijuana every day     Sexual activity: Not Currently       Comment: 9/4/20 no birth control, not sexually active   Other Topics Concern    Not on file   Social History Narrative     PREVIOUS MEDICATION TRIALS     Ambien     Effexor XR (current, 150mg)     Prozac (several years, 40mg, doesn't remember why she stopped)     Elavil     Zoloft (several years, doesn't remember the dose, stopped taking because she was pregnant)     Xanax           No negative effect with taking SSRI's      Social Determinants of Health          Financial Resource Strain: Low Risk     Difficulty of Paying Living Expenses: Not hard at all   Food Insecurity: No Food Insecurity    Worried About [address] in the Last Year: Never true    Ran Out of Food in the Last Year: Never true   Transportation Needs: No Transportation Needs    Lack of Transportation (Medical): No    Lack of Transportation (Non-Medical): No   Physical Activity:     Days of Exercise per Week:     Minutes of Exercise per Session:    Stress: No Stress Concern Present    Feeling of Stress : Not at all   Social Connections:     Frequency of Communication with Friends and Family:     Frequency of Social Gatherings with Friends and Family:     Attends Episcopalian Services:     Active Member of Clubs or Organizations:     Attends Club or Organization Meetings:     Marital Status:    Intimate Partner Violence: Not At Risk    Fear of Current or Ex-Partner: No    Emotionally Abused: No    Physically Abused: No    Sexually Abused: No            TOBACCO:   reports that she has been smoking. She started smoking about 25 years ago. She has been smoking about 1.00 pack per day. She has never used smokeless tobacco.  ETOH:   reports current alcohol use of about 2.0 standard drinks of alcohol per week.     Family History:   Family History         Family History   Problem Relation Age of Onset    High Blood Pressure Mother      Depression Mother      Anxiety Disorder Mother      Heart Attack Father      Heart Disease Father      High Cholesterol Father              Diagnosis:     The encounter diagnosis was Mild episode of recurrent major depressive disorder ([facility]    Past Medical History             Diagnosis Date    CAD (coronary artery disease)       MI about 1 1/2 yrs ago    CHF (congestive heart failure) ([facility]      Hyperlipidemia      Hypertension      kidney stones      VINOD (obstructive sleep apnea)       uses CPAP    Psoriasis      Psychiatric problem       hx of depression and anxiety since a teenager.         Other psychosocial and environmental problems     Plan:  1. Continue medication management  2. CBT to target cognitive distortions  3. Discuss therapeutic goals  A.  Setting boundaries   B. Assertive Communications     Pt interventions:  Trained in strategies for increasing balanced thinking, Provided education, Discussed self-care (sleep, nutrition, rewarding activities, social support, exercise), [place]-setting to identify pt's primary goals for [facility] visit / overall health, Supportive techniques, Emphasized self-care as important for managing overall health and Identified maladaptive thoughts        Drew Shabazz MSW, LCSW

## 2022-02-11 ENCOUNTER — TELEPHONE (OUTPATIENT)
Dept: PSYCHIATRY | Age: 42
End: 2022-02-11

## 2022-02-11 NOTE — TELEPHONE ENCOUNTER
Called pt for appointment reminder.   -left voicemail, requesting a return call      Electronically signed by Isacc Becerril MA on 2/11/2022 at 2:20 PM

## 2022-02-14 ENCOUNTER — OFFICE VISIT (OUTPATIENT)
Dept: PSYCHIATRY | Age: 42
End: 2022-02-14
Payer: COMMERCIAL

## 2022-02-14 VITALS
HEIGHT: 64 IN | WEIGHT: 224.8 LBS | SYSTOLIC BLOOD PRESSURE: 115 MMHG | TEMPERATURE: 97.3 F | DIASTOLIC BLOOD PRESSURE: 78 MMHG | HEART RATE: 86 BPM | BODY MASS INDEX: 38.38 KG/M2 | OXYGEN SATURATION: 98 %

## 2022-02-14 DIAGNOSIS — F33.0 MILD EPISODE OF RECURRENT MAJOR DEPRESSIVE DISORDER (HCC): Primary | ICD-10-CM

## 2022-02-14 PROCEDURE — 99214 OFFICE O/P EST MOD 30 MIN: CPT | Performed by: NURSE PRACTITIONER

## 2022-02-14 NOTE — PROGRESS NOTES
2/14/22        Progress Note    Avis Sauer 1980      Chief Complaint   Patient presents with    New Patient     from previous APRN    Medication Check         Subjective:    Patient is a 39 y.o. female diagnosed with MDD and presents today for follow-up. Last seen in clinic by previous APRN in this office and prior records were reviewed. Last visit: Doing as good as can be expected. \" She reports that on Friday night, her daughter was in a car accident, air lifted to [place]. She reports that her daughter has talked to them. She is out of the trauma bay. She says she is handling the situation better than she thought she could have. She reports that people are coming around them in support, helping them with food, yard work  She reports that she continues to do well on the medications. She is handling her daughter's accident very well  Will follow up in about 6 months. She verbalizes that she will call the office should she need to be seen sooner. Today: her daughters recovery has gone very well, biggest lasting complication is TBI. She has a bright and optimistic outlook. She is doing well on medication  Sleep fluctuates, sometimes she sleeps very well for an extended period of time and then she will have a period where she does not sleep well. She had concerns that she may be on a bipolar disorder spectrum however she has not had any symptoms resembling bipolar in quite some time other than sleep. We discussed sleep apnea and getting her machine reevaluated. Patient agreed with this plan at this time. She denies SI HI AVH she requests no medication changes at this time she reports that she is being controlled very well for depression and anxiety. She denies side effects of medications we will follow-up in 6 months    Absent  suicidal ideation. Reports compliance with medications as good .      Sleep:  avg 6-7 hrs (is using an glenroy called [employer]) has a CPAP    Pt denies excessive spending , urinary incontinence, STD; MEN: testicular pain or swelling, erectile dysfunction; WOMEN: LMP, heavy menstrual bleeding (menorrhagia), irregular periods, postmenopausal bleeding, menstrual pain (dymenorrhea, vaginal discharge)    Musculoskeletal: (bone pain/fracture, joint pain or swelling, musle pain)    Integumentary: (rashes, acne, non-healing sores, itching, breast lumps, breast pain, nipple discharge, hair loss)    Neurologic: (HA, muscle weakness, paresthesias (numbness, coldness, crawling or prickling), memory loss, seizure, dizziness)    Psychiatric:  (anxiety, sadness, irritability/anger, insomnia, suicidality)    Endocrine: (heat or cold intolerance, excessive thirst (polydipsia), excessive hunger (polyphagia))    Immune/Allergic: (hives, seasonal or environmental allergies, HIV exposure)    Hematologic/Lymphatic: (lymph node enlargement, easy bleeding or bruising)    History obtained via chart review and patient    PCP is Juany Lux DO     Past Medical History:   Diagnosis Date    CAD (coronary artery disease)     MI about 1 1/2 yrs ago    [facility]estive [facility] failure) [facility] [address]     Hyperlipidemia     Hypertension     kidney stones     VINOD (obstructive sleep apnea)     uses CPAP    Psoriasis     Psychiatric problem     hx of depression and anxiety since a teenager.         Psychiatric Review of Systems    Mood Depression: negative    Nelly: negative    Mood Other:negative    Anxiety: positive (where, when, who, how long, how frequent)    Panic: negative     OCD: negative    PTSD: negative    Psychosis: negative    Social anxiety symptoms:  negative    Simple phobias: negative    (heights, planes, spiders, etc.)    Paranoia: negative    ADHD symptoms: positive: scattered thoughts,       Eating Disorder symptoms:  negative    (binging, purging, excessive exercising)    Delusions:  negative    (TV, radio, thought broadcasting, mind control, referential thinking)    (persecutory delusion - e.g., mg by mouth daily    Historical Provider, MD     Social History     Socioeconomic History    Marital status:      Spouse name: Not on file    Number of children: 2    Years of education: assoc degree    Highest education level: Not on file   Occupational History    Not on file   Tobacco Use    Smoking status: Current Every Day Smoker     Packs/day: 1.00     Start date: 56    Smokeless tobacco: Never Used    Tobacco comment: 6/5/19 pt given in on smoking and smoking cessation. Vaping Use    Vaping Use: Former    Quit date: 10/15/2019    Substances: Never   Substance and Sexual Activity    Alcohol use: Yes     Alcohol/week: 2.0 standard drinks     Types: 1 Glasses of wine, 1 Shots of liquor per week     Comment: \"I used to drink a lot. I drink 2 to 3 drinks a month now\".  Drug use: Yes     Types: Marijuana Fredick Copping)     Comment: uses 9/4/20 marijuana every day     Sexual activity: Not Currently     Comment: 9/4/20 no birth control, not sexually active   Other Topics Concern    Not on file   Social History Narrative    Not on file     Social Determinants of Health     Financial Resource Strain:     Difficulty of Paying Living Expenses: Not on file   Food Insecurity:     Worried About [address] [employer] in the Last Year: Not on file    Yesy of Food in the Last Year: Not on file   Transportation Needs:     Lack of Transportation (Medical): Not on file    Lack of Transportation (Non-Medical):  Not on file   Physical Activity:     Days of Exercise per Week: Not on file    Minutes of Exercise per Session: Not on file   Stress: No Stress Concern Present    Feeling of Stress : Not at all   Social Connections:     Frequency of Communication with Friends and Family: Not on file    Frequency of Social Gatherings with Friends and Family: Not on file    Attends Roman Catholic Services: Not on file   [employer] Clubs or Organizations: Not on file    Attends Club or Organization Meetings: Not on file    Marital Status: Not on file   Intimate Partner Violence: Not At Risk    Fear of Current or Ex-Partner: No    Emotionally Abused: No    Physically Abused: No    Sexually Abused: No   Housing Stability:     Unable to Pay for Housing in the Last Year: Not on file    Number of Places Lived in the Last Year: Not on file    Unstable Housing in the Last Year: Not on file       MSE:  Appearance: Appropriately groomed. Made good eye contact. Gait stable. No abnormal movements or tremor. Behavior: Calm, cooperative, and socially appropriate. No psychomotor retardation/agitation appreciated. Speech: Normal in tone, volume, and quality. No slurring, dysarthria or pressured speech noted. Mood: \"good\"   Affect: Mood congruent. Thought Process: Appears linear, logical and goal oriented. Causality appears intact. Thought Content: Denies active suicidal and homicidal ideations. No overt delusions or paranoia appreciated. Perceptions: Denies auditory or visual hallucinations at present time. Not responding to internal stimuli. Concentration: Intact. Orientation: to person, place, date, and situation. Language: Intact. Fund of information: Intact. Memory: Recent and remote appear intact. Impulsivity: Limited. Neurovegitative: Fair appetite and sleep. Insight: Fair. Judgment: Fair. Cognition: Can spell \"world\" backwards:  Yes                    Can do serial 7's: Yes    No results found for: NA, K, CL, CO2, BUN, CREATININE, GLUCOSE, CALCIUM, PROT, LABALBU, BILITOT, ALKPHOS, AST, ALT, LABGLOM, GFRAA, AGRATIO, GLOB  No results found for: NA, K, CL, CO2, BUN, CREATININE, GLUCOSE, CALCIUM   No results found for: CHOL  No results found for: TRIG  No results found for: HDL  No results found for: LDLCHOLESTEROL, LDLCALC  No results found for: LABVLDL, VLDL  No results found for: CHOLHDLRATIO  No results found for: LABA1C  No results found for: EAG  No results found for: TSHFT4, TSH  No results found for: VITD25  No results found for: FCABSUQX29   No results found for: FOLATE     Assessment:   1. Mild episode of recurrent major depressive disorder (HCC)        No evidence of acute suicidality, homicidality or psychosis observed. Patient is psychiatrically stable    Plan:    1. Continue   Effexor XR, 225mg, daily  Buspirone, 30mg, twice daily  Amitriptyline, 25mg, nightly  Clonidine, 0.1mg, nightly (for focus, and anxiety)  Melatonin, 10mg, nightly as needed for sleep initiation (hasn't used it for the last couple of weeks and hasn't needed it)     Start      Discontinue      The risks, benefits, side effects, indications, contraindications, and adverse effects of the medications have been discussed. Yes.  2. The pt has verbalized understanding and has capacity to give informed consent. 3. The [facility] [place] report has been reviewed according to [facility] regulations. 4. Supportive therapy offered. 5. Follow up: Return in about 6 months (around 8/14/2022). 6. The patient has been advised to call with any problems. 7. Controlled substance Treatment Plan: NA.  8. The above listed medications have been continued, modifications in meds and other orders/labs as follows: No orders of the defined types were placed in this encounter. No orders of the defined types were placed in this encounter. 9. Additional comments: discussed sleep hygiene, discussed the use of coping skills and relaxation strategies to manage symptoms. 10.Over 50% of the total visit time of   30  minutes was spent on counseling and/or coordination of care of:                        1. Mild episode of recurrent major depressive disorder [facility])                      Psychotherapy Topics: mood/medication effectiveness family, health and occupational    Walpole Govern, APRN - CNP    This dictation was generated by voice recognition computer software.   Although all attempts are made to edit the dictation for accuracy, there may be errors in the transcription that are not intended.

## 2022-03-11 ENCOUNTER — TELEPHONE (OUTPATIENT)
Dept: PSYCHIATRY | Age: 42
End: 2022-03-11

## 2022-03-11 RX ORDER — AMITRIPTYLINE HYDROCHLORIDE 25 MG/1
25 TABLET, FILM COATED ORAL NIGHTLY
Qty: 90 TABLET | Refills: 0 | Status: SHIPPED | OUTPATIENT
Start: 2022-03-11 | End: 2022-07-11

## 2022-03-11 RX ORDER — BUSPIRONE HYDROCHLORIDE 30 MG/1
TABLET ORAL
Qty: 60 TABLET | Refills: 0 | Status: SHIPPED | OUTPATIENT
Start: 2022-03-11 | End: 2022-05-03

## 2022-03-11 RX ORDER — CLONIDINE HYDROCHLORIDE 0.1 MG/1
0.1 TABLET ORAL NIGHTLY
Qty: 90 TABLET | Refills: 0 | Status: SHIPPED | OUTPATIENT
Start: 2022-03-11 | End: 2022-07-11

## 2022-03-11 NOTE — TELEPHONE ENCOUNTER
Pt made aware that refill RXs for Buspar, Elavil and Clonidine had been sent to her pharmacy per Marline DILLARD.
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
## 2022-05-03 ENCOUNTER — TELEPHONE (OUTPATIENT)
Dept: PSYCHIATRY | Age: 42
End: 2022-05-03

## 2022-05-03 RX ORDER — BUSPIRONE HYDROCHLORIDE 30 MG/1
TABLET ORAL
Qty: 60 TABLET | Refills: 3 | Status: SHIPPED | OUTPATIENT
Start: 2022-05-03
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
## 2022-05-03 NOTE — TELEPHONE ENCOUNTER
Called and let pt know that her script for buspirone was sent to her pharmacy     Electronically signed by Ct Blanca on 5/3/2022 at 2:47 PM

## 2022-05-19 ENCOUNTER — OFFICE VISIT (OUTPATIENT)
Dept: CARDIOLOGY | Facility: CLINIC | Age: 42
End: 2022-05-19

## 2022-05-19 VITALS
WEIGHT: 228 LBS | DIASTOLIC BLOOD PRESSURE: 90 MMHG | SYSTOLIC BLOOD PRESSURE: 138 MMHG | HEIGHT: 65 IN | BODY MASS INDEX: 37.99 KG/M2 | OXYGEN SATURATION: 100 % | HEART RATE: 83 BPM

## 2022-05-19 DIAGNOSIS — Z72.0 TOBACCO ABUSE: ICD-10-CM

## 2022-05-19 DIAGNOSIS — I25.10 CORONARY ARTERY DISEASE INVOLVING NATIVE CORONARY ARTERY OF NATIVE HEART WITHOUT ANGINA PECTORIS: Primary | ICD-10-CM

## 2022-05-19 DIAGNOSIS — E78.2 MIXED HYPERLIPIDEMIA: ICD-10-CM

## 2022-05-19 PROCEDURE — 99213 OFFICE O/P EST LOW 20 MIN: CPT | Performed by: INTERNAL MEDICINE

## 2022-05-19 PROCEDURE — 93000 ELECTROCARDIOGRAM COMPLETE: CPT | Performed by: INTERNAL MEDICINE

## 2022-05-20 NOTE — PROGRESS NOTES
Subjective:     Encounter Date:05/19/2022      Patient ID: Leidy Lerma is a 41 y.o. female with coronary artery disease, status post PCI of the right coronary artery in 2017, hyperlipidemia, tobacco abuse, presenting today for routine follow-up.    Chief Complaint: Here today for follow-up of coronary artery disease    This patient presents today for routine follow-up.  She has been doing well and denies having any problems since we last saw her.  From a cardiac standpoint, she denies having chest pain, significant shortness of breath, dyspnea on exertion.  No significant edema, orthopnea, PND.  No lightheadedness, dizziness, syncope or palpitations.  She remains on aspirin, statin and beta-blocker therapies.  She is tolerating all these well.  Blood pressure is generally well controlled.  She says that she is due for lipid panel.  Unfortunately, she does continue to smoke.    Coronary Artery Disease  Presents for follow-up visit. Pertinent negatives include no chest pain, chest pressure, chest tightness, dizziness, leg swelling, palpitations or shortness of breath. The symptoms have been stable. Compliance with medications is good.       Current Outpatient Medications:   •  amitriptyline (ELAVIL) 25 MG tablet, Take 25 mg by mouth Every Night., Disp: , Rfl:   •  aspirin 81 MG EC tablet, Take 1 tablet by mouth Daily., Disp: 30 tablet, Rfl: 11  •  atorvastatin (LIPITOR) 80 MG tablet, TAKE 1 TABLET BY MOUTH ONCE DAILY AT NIGHT, Disp: 90 tablet, Rfl: 3  •  busPIRone (BUSPAR) 30 MG tablet, Take 30 mg by mouth 2 (Two) Times a Day., Disp: , Rfl:   •  cloNIDine (CATAPRES) 0.1 MG tablet, Take 0.1 mg by mouth Every Night., Disp: , Rfl:   •  HUMIRA PEN 40 MG/0.4ML Pen-injector Kit, Inject 40 mg under the skin into the appropriate area as directed Every 14 (Fourteen) Days., Disp: , Rfl:   •  metoprolol tartrate (LOPRESSOR) 25 MG tablet, TAKE 1 TABLET BY MOUTH EVERY 12 HOURS, Disp: 180 tablet, Rfl: 3  •  nitroglycerin  (NITROSTAT) 0.4 MG SL tablet, Place 1 tablet under the tongue Every 5 (Five) Minutes As Needed for Chest Pain. Take no more than 3 doses in 15 minutes., Disp: 30 tablet, Rfl: 12  •  VENLAFAXINE HCL PO, Take 150 mg by mouth Daily., Disp: , Rfl:     Allergies   Allergen Reactions   • Penicillins Hives     Social History     Tobacco Use   • Smoking status: Current Every Day Smoker     Packs/day: 1.00     Types: Cigarettes   • Smokeless tobacco: Never Used   Substance Use Topics   • Alcohol use: Yes     Comment: 1-2 drinks a week of liquor     Review of Systems   Constitutional: Negative for fever and weight loss.   Cardiovascular: Negative for chest pain, dyspnea on exertion, leg swelling, orthopnea, palpitations, paroxysmal nocturnal dyspnea and syncope.   Respiratory: Negative for chest tightness, cough, shortness of breath and wheezing.    Hematologic/Lymphatic: Negative for adenopathy and bleeding problem.   Gastrointestinal: Negative for abdominal pain, nausea and vomiting.   Neurological: Negative for dizziness, headaches and loss of balance.   Psychiatric/Behavioral: The patient is nervous/anxious.        ECG 12 Lead    Date/Time: 5/19/2022 5:00 PM  Performed by: Sergo Aguillon MD  Authorized by: Sergo Aguillon MD   Comparison: compared with previous ECG from 5/19/2021  Similar to previous ECG  Rhythm: sinus rhythm  Rate: normal  BPM: 83  Conduction: conduction normal  QRS axis: normal  Other findings: non-specific ST-T wave changes    Clinical impression: non-specific ECG             Objective:     Vitals reviewed.   Constitutional:       General: Not in acute distress.     Appearance: Obese.   Eyes:      Extraocular Movements: Extraocular movements intact.      Pupils: Pupils are equal, round, and reactive to light.   HENT:      Head: Normocephalic and atraumatic.   Neck:      Thyroid: No thyroid mass.      Vascular: No JVD.   Pulmonary:      Effort: Pulmonary effort is normal.      Breath  "sounds: Normal breath sounds. No wheezing. No rhonchi. No rales.   Cardiovascular:      Normal rate. Regular rhythm.      Murmurs: There is no murmur.      No gallop.   Pulses:     Intact distal pulses.   Edema:     Peripheral edema absent.   Abdominal:      General: Bowel sounds are normal. There is no distension.      Palpations: Abdomen is soft.      Tenderness: There is no abdominal tenderness.   Musculoskeletal:      Extremities: No clubbing present.Skin:     General: Skin is warm and dry. There is no cyanosis.      Findings: No erythema or rash.   Neurological:      Mental Status: Alert and oriented to person, place, and time.      Cranial Nerves: No cranial nerve deficit.       /90   Pulse 83   Ht 165.1 cm (65\")   Wt 103 kg (228 lb)   SpO2 100%   BMI 37.94 kg/m²     Data/Lab Review:     Results for orders placed during the hospital encounter of 10/05/17    Adult Transthoracic Echo Complete W/ Cont if Necessary Per Protocol    Interpretation Summary  · Left ventricular systolic function is normal. Estimated EF appears to be in the range of 56 - 60%.  · The following left ventricular wall segments are hypokinetic: basal inferior.  · Left ventricular wall thickness is consistent with borderline concentric hypertrophy.  · Trace tricuspid valve regurgitation is present. Estimated right ventricular systolic pressure from tricuspid regurgitation is normal (<35 mmHg).    ===================================================    Cardiac catheterization PCI of the right coronary artery 2017:    Selective Coronary Angiography:     Left Main Coronary Artery: The left main coronary artery arises from the left coronary cusp and bifurcates into the LAD and left circumflex arteries. Luminal irregularities are present, but no significant disease.     Left Anterior Descending Artery: The left anterior descending artery arises from the distal left main coronary artery and supplies one significant diagonal branch and a " "second very small diagonal branch and the left anterior descending artery terminates by wrapping the apex.  There is mild disease throughout the course of the left anterior descending artery with irregularities up to 30%or so.  The diagonal branch also has mild to moderate disease but no significant obstructive disease identified throughout the LAD of the diagonal branches..      Left Circumflex: The left circumflex artery arises from the distal left main artery and supplies essentially 2 obtuse marginals.  The first obtuse marginal has mild diffuse disease throughout the course the vessel.  The second obtuse marginal essentially terminates as 2 terminal branches each of which have mild irregularities.  The circumflex has no significant obstructive disease but irregularities up to 30%.     Right Coronary Artery: The right coronary artery arises from the right coronary cusp and is dominant for the posterior circulation.  On the initial angiograms, the right coronary artery is open but does have a subtotal occlusion in the midportion the vessel just at the takeoff of a right ventricular marginal branch.  The proximal portion the vessels diffusely diseased and then there is an ectatic portion just proximal to the subtotally occluded segment.  After this, there are 2 right ventricular marginal branches present.  Distally, the right coronary artery provides flow to a small posterior descending artery multiple posterolateral branches.  The entire vessel has diffuse disease throughout its course.     Percutaneous Coronary Intervention to the mid right coronary artery:      Guide Catheter: 6 Polish JR4  Anticoagulation: Unfractionated heparin  Wire(s): 0.014\" Prowater     A 0.014 inch Prowater wire was used to cross the stenosis in the mid right coronary artery and advanced distally in a stable position.  The lesion was then dilated with a 2.0 x 12 mm balloon.  After this, the lesion was stented with a Xience 3.5 x 23 mm " drug-eluting stent.  After this, the proximal portion was postdilated with a noncompliant 4.0 x 12 mm balloon.  Follow-up angiography revealed no residual stenosis, BRAN-3 flow down the right coronary artery and preserved flow in the right ventricular marginal branches.  There is no evidence of dissection, perforation, distal embolization.  The proximal portion the vessel remains diffusely diseased and was intentionally left untreated.  The result at the stent site is thought to be a suitable angiographic result.        Assessment:          Diagnosis Plan   1. Coronary artery disease involving native coronary artery of native heart without angina pectoris  ECG 12 Lead   2. Mixed hyperlipidemia     3. Tobacco abuse            Plan:       1.  Coronary artery disease: Stable at this time.  The patient continues aspirin, beta-blocker, statin therapies.  Continue these medications without change.    2.  Mixed hyperlipidemia: The patient is due for lipid panel according to her report.  She continues high intensity statin therapy.  I have asked her to have a copy of her lipids sent to us for review.    3.  Tobacco abuse: Leidy Lerma  reports that she has been smoking cigarettes. She has been smoking about 1.00 pack per day. She has never used smokeless tobacco.. I have educated her on the risk of diseases from using tobacco products such as cancer, COPD and heart disease. I advised her to quit and she is not willing to quit. I spent 5 minutes counseling the patient.    We will plan to see the patient back in 12 months unless otherwise needed sooner.

## 2022-07-11 ENCOUNTER — TELEPHONE (OUTPATIENT)
Dept: PSYCHIATRY | Age: 42
End: 2022-07-11

## 2022-07-11 RX ORDER — AMITRIPTYLINE HYDROCHLORIDE 25 MG/1
25 TABLET, FILM COATED ORAL NIGHTLY
Qty: 90 TABLET | Refills: 0 | Status: SHIPPED | OUTPATIENT
Start: 2022-07-11 | End: 2022-09-09

## 2022-07-11 RX ORDER — VENLAFAXINE HYDROCHLORIDE 150 MG/1
CAPSULE, EXTENDED RELEASE ORAL
Qty: 90 CAPSULE | Refills: 0 | Status: SHIPPED | OUTPATIENT
Start: 2022-07-11

## 2022-07-11 RX ORDER — CLONIDINE HYDROCHLORIDE 0.1 MG/1
0.1 TABLET ORAL NIGHTLY
Qty: 90 TABLET | Refills: 0 | Status: SHIPPED | OUTPATIENT
Start: 2022-07-11 | End: 2022-09-09

## 2022-07-11 RX ORDER — VENLAFAXINE HYDROCHLORIDE 75 MG/1
CAPSULE, EXTENDED RELEASE ORAL
Qty: 90 CAPSULE | Refills: 0 | Status: SHIPPED | OUTPATIENT
Start: 2022-07-11 | End: 2022-09-09
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
## 2022-07-11 NOTE — TELEPHONE ENCOUNTER
Called and let pt now that her scripts for clonidine,amitriptyline,venlafaxine 75mg and 150mg was sent to her pharmacy     Electronically signed by Ayala Davis on 7/11/2022 at 11:19 AM

## 2022-08-12 ENCOUNTER — TELEPHONE (OUTPATIENT)
Dept: PSYCHIATRY | Age: 42
End: 2022-08-12

## 2022-08-12 NOTE — TELEPHONE ENCOUNTER
Called pt for appointment reminder.     -Pt confirmed      Electronically signed by Jim Rinaldi MA on 8/12/2022 at 10:52 AM

## 2022-08-15 ENCOUNTER — OFFICE VISIT (OUTPATIENT)
Dept: PSYCHIATRY | Age: 42
End: 2022-08-15
Payer: COMMERCIAL

## 2022-08-15 VITALS
HEIGHT: 64 IN | HEART RATE: 89 BPM | WEIGHT: 230.5 LBS | BODY MASS INDEX: 39.35 KG/M2 | DIASTOLIC BLOOD PRESSURE: 72 MMHG | TEMPERATURE: 99 F | SYSTOLIC BLOOD PRESSURE: 114 MMHG | OXYGEN SATURATION: 97 %

## 2022-08-15 DIAGNOSIS — F33.0 MILD EPISODE OF RECURRENT MAJOR DEPRESSIVE DISORDER (HCC): Primary | ICD-10-CM

## 2022-08-15 PROCEDURE — 99214 OFFICE O/P EST MOD 30 MIN: CPT | Performed by: NURSE PRACTITIONER

## 2022-08-15 ASSESSMENT — PATIENT HEALTH QUESTIONNAIRE - PHQ9
SUM OF ALL RESPONSES TO PHQ QUESTIONS 1-9: 3
7. TROUBLE CONCENTRATING ON THINGS, SUCH AS READING THE NEWSPAPER OR WATCHING TELEVISION: 0
SUM OF ALL RESPONSES TO PHQ QUESTIONS 1-9: 3
6. FEELING BAD ABOUT YOURSELF - OR THAT YOU ARE A FAILURE OR HAVE LET YOURSELF OR YOUR FAMILY DOWN: 0
4. FEELING TIRED OR HAVING LITTLE ENERGY: 0
9. THOUGHTS THAT YOU WOULD BE BETTER OFF DEAD, OR OF HURTING YOURSELF: 0
SUM OF ALL RESPONSES TO PHQ QUESTIONS 1-9: 3
10. IF YOU CHECKED OFF ANY PROBLEMS, HOW DIFFICULT HAVE THESE PROBLEMS MADE IT FOR YOU TO DO YOUR WORK, TAKE CARE OF THINGS AT HOME, OR GET ALONG WITH OTHER PEOPLE: 0
SUM OF ALL RESPONSES TO PHQ QUESTIONS 1-9: 3
3. TROUBLE FALLING OR STAYING ASLEEP: 1
2. FEELING DOWN, DEPRESSED OR HOPELESS: 1
5. POOR APPETITE OR OVEREATING: 0
SUM OF ALL RESPONSES TO PHQ9 QUESTIONS 1 & 2: 2
8. MOVING OR SPEAKING SO SLOWLY THAT OTHER PEOPLE COULD HAVE NOTICED. OR THE OPPOSITE, BEING SO FIGETY OR RESTLESS THAT YOU HAVE BEEN MOVING AROUND A LOT MORE THAN USUAL: 0
1. LITTLE INTEREST OR PLEASURE IN DOING THINGS: 1
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
## 2022-09-09 ENCOUNTER — TELEPHONE (OUTPATIENT)
Dept: PSYCHIATRY | Age: 42
End: 2022-09-09

## 2022-09-09 RX ORDER — ATORVASTATIN CALCIUM 80 MG/1
TABLET, FILM COATED ORAL
Qty: 90 TABLET | Refills: 3 | Status: SHIPPED | OUTPATIENT
Start: 2022-09-09

## 2022-09-09 RX ORDER — AMITRIPTYLINE HYDROCHLORIDE 25 MG/1
25 TABLET, FILM COATED ORAL NIGHTLY
Qty: 90 TABLET | Refills: 0 | Status: SHIPPED | OUTPATIENT
Start: 2022-09-09 | End: 2022-12-08

## 2022-09-09 RX ORDER — VENLAFAXINE HYDROCHLORIDE 75 MG/1
CAPSULE, EXTENDED RELEASE ORAL
Qty: 90 CAPSULE | Refills: 0 | Status: SHIPPED | OUTPATIENT
Start: 2022-09-09

## 2022-09-09 RX ORDER — CLONIDINE HYDROCHLORIDE 0.1 MG/1
0.1 TABLET ORAL NIGHTLY
Qty: 90 TABLET | Refills: 0 | Status: SHIPPED | OUTPATIENT
Start: 2022-09-09 | End: 2022-12-08
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
## 2022-09-09 NOTE — TELEPHONE ENCOUNTER
Called to let pt now that her script for amitriptyline,clonidine, and venlafaxine was sent to her pharmacy       Was unable to lvm    Electronically signed by Renae Valle on 9/9/2022 at 11:48 AM

## 2022-12-02 ENCOUNTER — TELEPHONE (OUTPATIENT)
Dept: PSYCHIATRY | Age: 42
End: 2022-12-02

## 2022-12-02 RX ORDER — AMITRIPTYLINE HYDROCHLORIDE 25 MG/1
25 TABLET, FILM COATED ORAL NIGHTLY
Qty: 90 TABLET | Refills: 0 | Status: SHIPPED | OUTPATIENT
Start: 2022-12-02 | End: 2023-03-02

## 2022-12-02 RX ORDER — BUSPIRONE HYDROCHLORIDE 30 MG/1
TABLET ORAL
Qty: 60 TABLET | Refills: 0 | Status: SHIPPED | OUTPATIENT
Start: 2022-12-02

## 2022-12-02 RX ORDER — CLONIDINE HYDROCHLORIDE 0.1 MG/1
0.1 TABLET ORAL NIGHTLY
Qty: 90 TABLET | Refills: 0 | Status: SHIPPED | OUTPATIENT
Start: 2022-12-02 | End: 2023-03-02

## 2022-12-02 RX ORDER — VENLAFAXINE HYDROCHLORIDE 150 MG/1
CAPSULE, EXTENDED RELEASE ORAL
Qty: 90 CAPSULE | Refills: 0 | Status: SHIPPED | OUTPATIENT
Start: 2022-12-02

## 2022-12-02 NOTE — TELEPHONE ENCOUNTER
Pt made aware that refill RXs for Clonidine, Buspar, Effexor and Amitriptyline had been sent to her pharmacy per Laura DILLARD.
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
## 2023-02-10 ENCOUNTER — TELEPHONE (OUTPATIENT)
Dept: PSYCHIATRY | Age: 43
End: 2023-02-10

## 2023-02-10 NOTE — TELEPHONE ENCOUNTER
Called pt for appointment reminder.     -Pt confirmed      Electronically signed by Kunal Durham MA on 2/10/2023 at 11:29 AM

## 2023-02-13 ENCOUNTER — OFFICE VISIT (OUTPATIENT)
Dept: PSYCHIATRY | Age: 43
End: 2023-02-13

## 2023-02-13 VITALS
DIASTOLIC BLOOD PRESSURE: 94 MMHG | BODY MASS INDEX: 37.14 KG/M2 | HEIGHT: 65 IN | TEMPERATURE: 98.3 F | WEIGHT: 222.9 LBS | HEART RATE: 103 BPM | OXYGEN SATURATION: 99 % | SYSTOLIC BLOOD PRESSURE: 141 MMHG

## 2023-02-13 DIAGNOSIS — F33.0 MILD EPISODE OF RECURRENT MAJOR DEPRESSIVE DISORDER (HCC): Primary | ICD-10-CM

## 2023-02-13 DIAGNOSIS — F41.1 GENERALIZED ANXIETY DISORDER: ICD-10-CM

## 2023-02-13 ASSESSMENT — PATIENT HEALTH QUESTIONNAIRE - PHQ9
SUM OF ALL RESPONSES TO PHQ QUESTIONS 1-9: 3
SUM OF ALL RESPONSES TO PHQ QUESTIONS 1-9: 3
8. MOVING OR SPEAKING SO SLOWLY THAT OTHER PEOPLE COULD HAVE NOTICED. OR THE OPPOSITE, BEING SO FIGETY OR RESTLESS THAT YOU HAVE BEEN MOVING AROUND A LOT MORE THAN USUAL: 0
4. FEELING TIRED OR HAVING LITTLE ENERGY: 0
SUM OF ALL RESPONSES TO PHQ9 QUESTIONS 1 & 2: 1
SUM OF ALL RESPONSES TO PHQ QUESTIONS 1-9: 3
6. FEELING BAD ABOUT YOURSELF - OR THAT YOU ARE A FAILURE OR HAVE LET YOURSELF OR YOUR FAMILY DOWN: 1
5. POOR APPETITE OR OVEREATING: 1
3. TROUBLE FALLING OR STAYING ASLEEP: 0
1. LITTLE INTEREST OR PLEASURE IN DOING THINGS: 0
SUM OF ALL RESPONSES TO PHQ QUESTIONS 1-9: 3
9. THOUGHTS THAT YOU WOULD BE BETTER OFF DEAD, OR OF HURTING YOURSELF: 0
7. TROUBLE CONCENTRATING ON THINGS, SUCH AS READING THE NEWSPAPER OR WATCHING TELEVISION: 0
2. FEELING DOWN, DEPRESSED OR HOPELESS: 1
10. IF YOU CHECKED OFF ANY PROBLEMS, HOW DIFFICULT HAVE THESE PROBLEMS MADE IT FOR YOU TO DO YOUR WORK, TAKE CARE OF THINGS AT HOME, OR GET ALONG WITH OTHER PEOPLE: 0
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
## 2023-05-25 NOTE — TELEPHONE ENCOUNTER
results found for: CHOL  No results found for: TRIG  No results found for: HDL  No results found for: LDLCHOLESTEROL, LDLCALC  No results found for: LABVLDL, VLDL  No results found for: CHOLHDLRATIO  No results found for: LABA1C  No results found for: EAG  No results found for: TSHFT4, TSH  No results found for: VITD25  No results found for: [id number]   No results found for: FOLATE      Assessment:    1. Mild episode of recurrent major depressive disorder ([facility] [address]    2. Generalized anxiety disorder             No evidence of acute suicidality, homicidality or psychosis observed. Patient is psychiatrically stable     Plan:     1. Continue    Effexor XR, 225mg, daily  Buspirone, 30mg, twice daily  Amitriptyline, 25mg, nightly  Clonidine, 0.1mg, nightly (for focus, and anxiety)  Melatonin, 10mg, nightly as needed for sleep initiation     Start      Discontinue        The risks, benefits, side effects, indications, contraindications, and adverse effects of the medications have been discussed. Yes.  2. The pt has verbalized understanding and has capacity to give informed consent. 3. The Macie Link report has been reviewed according to [facility] regulations. 4. Supportive therapy offered. 5. Follow up:    Return in about 6 months (around 8/13/2023). 6. The patient has been advised to call with any problems. 7. Controlled substance Treatment Plan: NA.  8. The above listed medications have been continued, modifications in meds and other orders/labs as follows:                 Encounter Medications    No orders of the defined types were placed in this encounter. No orders of the defined types were placed in this encounter. 9. Additional comments: Continue therapy, discussed sleep hygiene, discussed the use of coping skills and relaxation strategies to manage symptoms.             10.Over 50% of the total visit time of   30  minutes was spent on counseling and/or coordination of care of:

## 2023-05-26 ENCOUNTER — TELEPHONE (OUTPATIENT)
Dept: PSYCHIATRY | Age: 43
End: 2023-05-26

## 2023-05-26 RX ORDER — AMITRIPTYLINE HYDROCHLORIDE 25 MG/1
25 TABLET, FILM COATED ORAL NIGHTLY
Qty: 90 TABLET | Refills: 0 | Status: SHIPPED | OUTPATIENT
Start: 2023-05-26 | End: 2023-08-24

## 2023-05-26 RX ORDER — BUSPIRONE HYDROCHLORIDE 30 MG/1
TABLET ORAL
Qty: 60 TABLET | Refills: 0 | Status: SHIPPED | OUTPATIENT
Start: 2023-05-26

## 2023-05-26 RX ORDER — VENLAFAXINE HYDROCHLORIDE 150 MG/1
CAPSULE, EXTENDED RELEASE ORAL
Qty: 90 CAPSULE | Refills: 0 | Status: SHIPPED | OUTPATIENT
Start: 2023-05-26

## 2023-05-26 RX ORDER — CLONIDINE HYDROCHLORIDE 0.1 MG/1
0.1 TABLET ORAL NIGHTLY
Qty: 90 TABLET | Refills: 0 | Status: SHIPPED | OUTPATIENT
Start: 2023-05-26 | End: 2023-08-24

## 2023-05-26 RX ORDER — VENLAFAXINE HYDROCHLORIDE 75 MG/1
CAPSULE, EXTENDED RELEASE ORAL
Qty: 90 CAPSULE | Refills: 0 | Status: SHIPPED | OUTPATIENT
Start: 2023-05-26

## 2023-05-26 NOTE — TELEPHONE ENCOUNTER
Called and let pt know that her scripts for amitriptyline, clonidine, buspirone, effexor 75 MG and 150 MG was sent to her pharmacy    Electronically signed by Rory Hameed on 5/26/2023 at 10:27 AM

## 2023-05-31 ENCOUNTER — OFFICE VISIT (OUTPATIENT)
Dept: CARDIOLOGY | Facility: CLINIC | Age: 43
End: 2023-05-31

## 2023-05-31 VITALS
DIASTOLIC BLOOD PRESSURE: 76 MMHG | HEIGHT: 65 IN | BODY MASS INDEX: 37.24 KG/M2 | HEART RATE: 84 BPM | SYSTOLIC BLOOD PRESSURE: 112 MMHG | OXYGEN SATURATION: 99 % | WEIGHT: 223.5 LBS

## 2023-05-31 DIAGNOSIS — I25.10 CORONARY ARTERY DISEASE INVOLVING NATIVE CORONARY ARTERY OF NATIVE HEART WITHOUT ANGINA PECTORIS: Primary | ICD-10-CM

## 2023-05-31 DIAGNOSIS — E66.2 CLASS 2 OBESITY WITH ALVEOLAR HYPOVENTILATION, SERIOUS COMORBIDITY, AND BODY MASS INDEX (BMI) OF 37.0 TO 37.9 IN ADULT: Chronic | ICD-10-CM

## 2023-05-31 DIAGNOSIS — E78.2 MIXED HYPERLIPIDEMIA: ICD-10-CM

## 2023-05-31 DIAGNOSIS — Z72.0 TOBACCO ABUSE: ICD-10-CM

## 2023-05-31 PROBLEM — E66.812 CLASS 2 OBESITY WITH ALVEOLAR HYPOVENTILATION, SERIOUS COMORBIDITY, AND BODY MASS INDEX (BMI) OF 37.0 TO 37.9 IN ADULT: Chronic | Status: ACTIVE | Noted: 2023-05-31

## 2023-05-31 PROBLEM — E66.812 CLASS 2 OBESITY WITH ALVEOLAR HYPOVENTILATION, SERIOUS COMORBIDITY, AND BODY MASS INDEX (BMI) OF 37.0 TO 37.9 IN ADULT: Status: ACTIVE | Noted: 2023-05-31

## 2023-05-31 PROBLEM — E78.5 HYPERLIPIDEMIA: Chronic | Status: ACTIVE | Noted: 2017-11-09

## 2023-05-31 PROBLEM — F12.90 MARIJUANA USE: Status: ACTIVE | Noted: 2023-05-31

## 2023-05-31 RX ORDER — CLINDAMYCIN HYDROCHLORIDE 300 MG/1
CAPSULE ORAL
COMMUNITY
Start: 2023-05-25

## 2023-05-31 RX ORDER — METHYLPREDNISOLONE 4 MG/1
TABLET ORAL
COMMUNITY
Start: 2023-05-25

## 2023-05-31 NOTE — PROGRESS NOTES
Subjective:     Encounter Date:05/31/2023      Patient ID: Leidy Lerma is a 42 y.o. female with known coronary artery disease, prior RCA PCI in 2017 with AMI presentation, hyperlipidemia, tobacco abuse, obesity who presents today for routine follow up.     Chief Complaint: CAD Follow Up  Coronary Artery Disease  Presents for follow-up visit. Pertinent negatives include no chest pain, chest pressure, chest tightness, dizziness, leg swelling, palpitations or shortness of breath. The symptoms have been stable. Compliance with diet is good. Compliance with exercise is good. Compliance with medications is variable.     Ms. Lerma presents today for routine follow up. She reports to be feeling well. She has no cardiac complaints today. She reports compliance with her home medicines. She sees her PCP regularly. She recently had labs drawn by her PCP and has follow up to discuss lab results soon. She is aware her cholesterol is uncontrolled. She continues to smoke. She denies any chest pain, chest pressure, or tightness. She has no shortness of breath, dizziness, lightheadedness, palpitations, or fatigue.       The following portions of the patient's history were reviewed and updated as appropriate: allergies, current medications, past family history, past medical history, past social history, and past surgical history.    Allergies   Allergen Reactions    Penicillins Hives       Current Outpatient Medications:     amitriptyline (ELAVIL) 25 MG tablet, Take 1 tablet by mouth Every Night., Disp: , Rfl:     aspirin 81 MG EC tablet, Take 1 tablet by mouth Daily., Disp: 30 tablet, Rfl: 11    atorvastatin (LIPITOR) 80 MG tablet, TAKE 1 TABLET BY MOUTH ONCE DAILY AT BEDTIME, Disp: 90 tablet, Rfl: 3    busPIRone (BUSPAR) 30 MG tablet, Take 1 tablet by mouth 2 (Two) Times a Day., Disp: , Rfl:     clindamycin (CLEOCIN) 300 MG capsule, , Disp: , Rfl:     cloNIDine (CATAPRES) 0.1 MG tablet, Take 1 tablet by mouth Every Night.,  "Disp: , Rfl:     HUMIRA PEN 40 MG/0.4ML Pen-injector Kit, Inject 40 mg under the skin into the appropriate area as directed Every 14 (Fourteen) Days., Disp: , Rfl:     methylPREDNISolone (MEDROL) 4 MG dose pack, , Disp: , Rfl:     metoprolol tartrate (LOPRESSOR) 25 MG tablet, TAKE 1 TABLET BY MOUTH EVERY 12 HOURS, Disp: 180 tablet, Rfl: 3    nitroglycerin (NITROSTAT) 0.4 MG SL tablet, Place 1 tablet under the tongue Every 5 (Five) Minutes As Needed for Chest Pain. Take no more than 3 doses in 15 minutes., Disp: 30 tablet, Rfl: 12    VENLAFAXINE HCL PO, Take 150 mg by mouth Daily., Disp: , Rfl:     Review of Systems   Constitutional: Negative for malaise/fatigue.   Cardiovascular:  Negative for chest pain, dyspnea on exertion, irregular heartbeat, leg swelling, near-syncope, palpitations, paroxysmal nocturnal dyspnea and syncope.   Respiratory:  Negative for chest tightness, cough, shortness of breath and wheezing.    Hematologic/Lymphatic: Negative for bleeding problem.   Gastrointestinal:  Negative for nausea and vomiting.   Neurological:  Negative for dizziness, focal weakness, headaches, light-headedness and weakness.   Psychiatric/Behavioral:  Negative for altered mental status.        ECG 12 Lead    Date/Time: 5/31/2023 2:46 PM  Performed by: Magy Richard APRN  Authorized by: Magy Richard APRN   Comparison: compared with previous ECG from 5/19/2022  Rhythm: sinus rhythm  Rate: normal  BPM: 74  Conduction: conduction normal  ST Segments: ST segments normal  T Waves: T waves normal  QRS axis: normal      /76   Pulse 84   Ht 165.1 cm (65\")   Wt 101 kg (223 lb 8 oz)   SpO2 99%   BMI 37.19 kg/m²        Objective:     Vitals reviewed.   Constitutional:       General: Not in acute distress.     Appearance: Normal and healthy appearance. Well-developed, well-groomed and not in distress. Obese.   HENT:      Head: Normocephalic and atraumatic.   Pulmonary:      Effort: Pulmonary effort is normal.      " Breath sounds: Normal breath sounds. No wheezing. No rhonchi. No rales.   Cardiovascular:      Normal rate. Regular rhythm.      Murmurs: There is no murmur.      No gallop.  No rub.   Edema:     Peripheral edema absent.   Musculoskeletal:      Cervical back: Normal range of motion and neck supple. Skin:     General: Skin is warm and dry.   Neurological:      Mental Status: Alert, oriented to person, place, and time and oriented to person, place and time.   Psychiatric:         Attention and Perception: Attention normal.         Mood and Affect: Mood normal.         Speech: Speech normal.         Behavior: Behavior normal. Behavior is cooperative.         Thought Content: Thought content normal.         Cognition and Memory: Cognition normal.         Judgment: Judgment normal.       Lab Review:     Cardiac Catheterization October 2017 (aPl)  Impression:  1. Acute inferior ST segment elevation myocardial infarction due to right coronary artery occlusion/stenosis, now status post successful percutaneous coronary intervention to the mid right coronary artery with Xience drug eluting stent.  2.  Mild disease in the left anterior descending artery and left circumflex.    Results for orders placed during the hospital encounter of 10/05/17    Adult Transthoracic Echo Complete W/ Cont if Necessary Per Protocol    Interpretation Summary  · Left ventricular systolic function is normal. Estimated EF appears to be in the range of 56 - 60%.  · The following left ventricular wall segments are hypokinetic: basal inferior.  · Left ventricular wall thickness is consistent with borderline concentric hypertrophy.  · Trace tricuspid valve regurgitation is present. Estimated right ventricular systolic pressure from tricuspid regurgitation is normal (<35 mmHg).          Assessment:          Diagnosis Plan   1. Coronary artery disease involving native coronary artery of native heart without angina pectoris        2. Mixed  hyperlipidemia        3. Tobacco abuse        4. Class 2 obesity with alveolar hypoventilation, serious comorbidity, and body mass index (BMI) of 37.0 to 37.9 in adult               Plan:       CAD: Stable.  Feels well.  Denies shortness of breath or chest pain.  Continues on appropriate medical therapy with aspirin, statin, beta-blocker.    HLD: Recent Lipids drawn by PCP. We discussed adjunct therapies with [id number]. She has follow up scheduled with her PCP to discuss labs results in near future. We will also request her lipid results.     Tobacco Abuse: Daily Smoker, smoking cessation advised.     Obesity: BMI 37.19 - diet and exercise recommended      1 year follow up.   Smoking cessation  Continue current medications with improvement of lipids. She will follow up with PCP as planned. Leqvio as well as praluent/repatha discussed with pt today.

## 2023-06-30 ENCOUNTER — TELEPHONE (OUTPATIENT)
Dept: PSYCHIATRY | Age: 43
End: 2023-06-30

## 2023-06-30 RX ORDER — BUSPIRONE HYDROCHLORIDE 30 MG/1
TABLET ORAL
Qty: 60 TABLET | Refills: 0 | Status: SHIPPED | OUTPATIENT
Start: 2023-06-30

## 2023-08-15 ENCOUNTER — TELEPHONE (OUTPATIENT)
Dept: PSYCHIATRY | Age: 43
End: 2023-08-15

## 2023-08-15 NOTE — TELEPHONE ENCOUNTER
Called pt for appointment reminder.   -Pt confirmed    Electronically signed by Jolanta Chance MA on 8/15/2023 at 1:59 PM

## 2023-08-16 ENCOUNTER — OFFICE VISIT (OUTPATIENT)
Dept: PSYCHIATRY | Age: 43
End: 2023-08-16
Payer: COMMERCIAL

## 2023-08-16 VITALS
DIASTOLIC BLOOD PRESSURE: 89 MMHG | HEIGHT: 64 IN | TEMPERATURE: 98.2 F | WEIGHT: 224.2 LBS | HEART RATE: 92 BPM | BODY MASS INDEX: 38.28 KG/M2 | OXYGEN SATURATION: 95 % | SYSTOLIC BLOOD PRESSURE: 141 MMHG

## 2023-08-16 DIAGNOSIS — F41.1 GENERALIZED ANXIETY DISORDER: ICD-10-CM

## 2023-08-16 DIAGNOSIS — F33.0 MILD EPISODE OF RECURRENT MAJOR DEPRESSIVE DISORDER (HCC): Primary | ICD-10-CM

## 2023-08-16 PROCEDURE — 99214 OFFICE O/P EST MOD 30 MIN: CPT | Performed by: NURSE PRACTITIONER

## 2023-08-16 RX ORDER — CLONIDINE HYDROCHLORIDE 0.1 MG/1
TABLET ORAL
Qty: 180 TABLET | Refills: 0 | Status: SHIPPED | OUTPATIENT
Start: 2023-08-16 | End: 2023-11-14

## 2023-08-16 ASSESSMENT — PATIENT HEALTH QUESTIONNAIRE - PHQ9
6. FEELING BAD ABOUT YOURSELF - OR THAT YOU ARE A FAILURE OR HAVE LET YOURSELF OR YOUR FAMILY DOWN: 0
8. MOVING OR SPEAKING SO SLOWLY THAT OTHER PEOPLE COULD HAVE NOTICED. OR THE OPPOSITE, BEING SO FIGETY OR RESTLESS THAT YOU HAVE BEEN MOVING AROUND A LOT MORE THAN USUAL: 0
2. FEELING DOWN, DEPRESSED OR HOPELESS: 1
3. TROUBLE FALLING OR STAYING ASLEEP: 2
SUM OF ALL RESPONSES TO PHQ QUESTIONS 1-9: 6
7. TROUBLE CONCENTRATING ON THINGS, SUCH AS READING THE NEWSPAPER OR WATCHING TELEVISION: 0
1. LITTLE INTEREST OR PLEASURE IN DOING THINGS: 1
SUM OF ALL RESPONSES TO PHQ QUESTIONS 1-9: 6
SUM OF ALL RESPONSES TO PHQ QUESTIONS 1-9: 6
4. FEELING TIRED OR HAVING LITTLE ENERGY: 2
10. IF YOU CHECKED OFF ANY PROBLEMS, HOW DIFFICULT HAVE THESE PROBLEMS MADE IT FOR YOU TO DO YOUR WORK, TAKE CARE OF THINGS AT HOME, OR GET ALONG WITH OTHER PEOPLE: 1
5. POOR APPETITE OR OVEREATING: 0
SUM OF ALL RESPONSES TO PHQ QUESTIONS 1-9: 6
9. THOUGHTS THAT YOU WOULD BE BETTER OFF DEAD, OR OF HURTING YOURSELF: 0
SUM OF ALL RESPONSES TO PHQ9 QUESTIONS 1 & 2: 2
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
## 2023-09-05 ENCOUNTER — TELEPHONE (OUTPATIENT)
Dept: PSYCHIATRY | Age: 43
End: 2023-09-05

## 2023-09-05 RX ORDER — VENLAFAXINE HYDROCHLORIDE 150 MG/1
CAPSULE, EXTENDED RELEASE ORAL
Qty: 90 CAPSULE | Refills: 0 | Status: SHIPPED | OUTPATIENT
Start: 2023-09-05

## 2023-09-05 RX ORDER — VENLAFAXINE HYDROCHLORIDE 75 MG/1
CAPSULE, EXTENDED RELEASE ORAL
Qty: 90 CAPSULE | Refills: 0 | Status: SHIPPED | OUTPATIENT
Start: 2023-09-05

## 2023-09-05 RX ORDER — AMITRIPTYLINE HYDROCHLORIDE 25 MG/1
25 TABLET, FILM COATED ORAL NIGHTLY
Qty: 90 TABLET | Refills: 0 | Status: SHIPPED | OUTPATIENT
Start: 2023-09-05 | End: 2023-12-04

## 2023-09-05 RX ORDER — ATORVASTATIN CALCIUM 80 MG/1
TABLET, FILM COATED ORAL
Qty: 90 TABLET | Refills: 3 | Status: SHIPPED | OUTPATIENT
Start: 2023-09-05

## 2023-09-05 NOTE — TELEPHONE ENCOUNTER
Called and lvm asking pt to return phone call    When pt calls back I will let her know that her scripts for amitriptyline, effexor 75 and 150 mg was sent to her pharmacy    Electronically signed by Mich Mckeon on 9/5/2023 at 12:02 PM

## 2023-09-05 NOTE — TELEPHONE ENCOUNTER
Pt returned call and was informed that RXs for Amitriptyline and both Effexor doses had been sent to her pharmacy.

## 2023-09-05 NOTE — TELEPHONE ENCOUNTER
used to drink a lot. I drink 2 to 3 drinks a month now\". Drug use: Yes       Types: Marijuana [employer])       Comment: uses 9/4/20 marijuana every day     Sexual activity: Not Currently       Comment: 9/4/20 no birth control, not sexually active            MSE:  Appearance: Appropriately groomed. Made good eye contact. Gait stable. No abnormal movements or tremor. Behavior: Calm, cooperative, and socially appropriate. No psychomotor retardation/agitation appreciated. Speech: Normal in tone, volume, and quality. No slurring, dysarthria or pressured speech noted. Mood: \"really good\"   Affect: Mood congruent. Thought Process: Appears linear, logical and goal oriented. Causality appears intact. Thought Content: Denies active suicidal and homicidal ideations. No overt delusions or paranoia appreciated. Perceptions: Denies auditory or visual hallucinations at present time. Not responding to internal stimuli. Concentration: Intact. Orientation: to person, place, date, and situation. Language: Intact. Fund of information: Intact. Memory: Recent and remote appear intact. Impulsivity: Limited. Neurovegitative: Fair appetite and sleep. Insight: Fair. Judgment: Fair. Cognition: Can spell \"world\" backwards: Yes                    Can do serial 7's: Yes     No results found for: NA, K, CL, CO2, BUN, CREATININE, GLUCOSE, CALCIUM, PROT, LABALBU, BILITOT, ALKPHOS, AST, ALT, LABGLOM, GFRAA, AGRATIO, GLOB  No results found for: NA, K, CL, CO2, BUN, CREATININE, GLUCOSE, CALCIUM   No results found for: CHOL  No results found for: TRIG  No results found for: HDL  No results found for: LDLCHOLESTEROL, LDLCALC  No results found for: LABVLDL, VLDL  No results found for: CHOLHDLRATIO  No results found for: LABA1C  No results found for: EAG  No results found for: TSHFT4, TSH  No results found for: VITD25  No results found for: [id number]   No results found for: FOLATE      Assessment:    1.  Mild episode of

## 2023-11-06 NOTE — TELEPHONE ENCOUNTER
Pharmacy sent med refill request below. Last office visit : 8/16/2023 with Finesse DILLARD  Next office visit : 2/16/2024 with Finesse DILLARD    Requested Prescriptions     Pending Prescriptions Disp Refills    cloNIDine (CATAPRES) 0.1 MG tablet [Pharmacy Med Name: cloNIDine HCl 0.1 MG Oral Tablet] 180 tablet 0     Sig: TAKE 1 TABLET BY MOUTH IN THE MORNING AND 1 AT Luis Eduardo Peres RN         8/16/23                                           Progress Note     Timoteo Recinos 1980                                 Chief Complaint   Patient presents with    Medication Check    Follow-up         Subjective:    Patient is a 43 y.o. female diagnosed with MDD and presents today for follow-up. Last seen in clinic on 2/13/23 and prior records were reviewed. Last visit: she states overall she is doing well. Her daughter has moved out and is living on her own and making good choices. Her daughter has quite a bit of money from the settlement from the car accident. Overall she feels that her medications are working very well for her she has reported that she is sleeping quite a bit better. She states that she still has some days of feeling depressed or anxious to where she has to call in the work but they are few and far between and she states that overall she is doing very well. She denies SI HI AVH she requests no medication changes at this time she reports that she is being controlled very well for depression and anxiety. She denies side effects of medications we will follow-up in 6 months     Today: she has had some medical issues and has had to fight with insurance companies for coverage. She has had some issues with psoriasis as well as ear infections. She has had some issues with her teeth breaking as well, she is going to have all of her teeth pulled and get dentures. She is looking forward to getting her medical concerns addressed.     She has been working on getting her house cleaned

## 2023-11-07 ENCOUNTER — TELEPHONE (OUTPATIENT)
Dept: PSYCHIATRY | Age: 43
End: 2023-11-07

## 2023-11-07 RX ORDER — CLONIDINE HYDROCHLORIDE 0.1 MG/1
TABLET ORAL
Qty: 180 TABLET | Refills: 0 | Status: SHIPPED | OUTPATIENT
Start: 2023-11-07

## 2023-11-07 NOTE — TELEPHONE ENCOUNTER
Called and lvm letting pt know that her script for clonidne was sent to her pharmacy    Electronically signed by Gokul Cruz on 11/7/2023 at 2:34 PM

## 2023-12-04 RX ORDER — AMITRIPTYLINE HYDROCHLORIDE 25 MG/1
25 TABLET, FILM COATED ORAL NIGHTLY
Qty: 90 TABLET | Refills: 0 | Status: SHIPPED | OUTPATIENT
Start: 2023-12-04 | End: 2024-03-03

## 2023-12-04 RX ORDER — VENLAFAXINE HYDROCHLORIDE 75 MG/1
CAPSULE, EXTENDED RELEASE ORAL
Qty: 90 CAPSULE | Refills: 0 | Status: SHIPPED | OUTPATIENT
Start: 2023-12-04

## 2023-12-04 RX ORDER — VENLAFAXINE HYDROCHLORIDE 150 MG/1
CAPSULE, EXTENDED RELEASE ORAL
Qty: 90 CAPSULE | Refills: 0 | Status: SHIPPED | OUTPATIENT
Start: 2023-12-04

## 2023-12-04 NOTE — TELEPHONE ENCOUNTER
Normal in tone, volume, and quality. No slurring, dysarthria or pressured speech noted. Mood: \"really good\"   Affect: Mood congruent. Thought Process: Appears linear, logical and goal oriented. Causality appears intact. Thought Content: Denies active suicidal and homicidal ideations. No overt delusions or paranoia appreciated. Perceptions: Denies auditory or visual hallucinations at present time. Not responding to internal stimuli. Concentration: Intact. Orientation: to person, place, date, and situation. Language: Intact. Fund of information: Intact. Memory: Recent and remote appear intact. Impulsivity: Limited. Neurovegitative: Fair appetite and sleep. Insight: Fair. Judgment: Fair. Cognition: Can spell \"world\" backwards: Yes                    Can do serial 7's: Yes     No results found for: NA, K, CL, CO2, BUN, CREATININE, GLUCOSE, CALCIUM, PROT, LABALBU, BILITOT, ALKPHOS, AST, ALT, LABGLOM, GFRAA, AGRATIO, GLOB  No results found for: NA, K, CL, CO2, BUN, CREATININE, GLUCOSE, CALCIUM   No results found for: CHOL  No results found for: TRIG  No results found for: HDL  No results found for: LDLCHOLESTEROL, LDLCALC  No results found for: LABVLDL, VLDL  No results found for: CHOLHDLRATIO  No results found for: LABA1C  No results found for: EAG  No results found for: TSHFT4, TSH  No results found for: VITD25  No results found for: [id number]   No results found for: FOLATE      Assessment:    1. Mild episode of recurrent major depressive disorder ([address])    2. Generalized anxiety disorder                No evidence of acute suicidality, homicidality or psychosis observed. Patient is psychiatrically stable     Plan:     1.    Continue    Effexor XR, 225mg, daily  Buspirone, 30mg, twice daily  Amitriptyline, 25mg, nightly  Melatonin, 10mg, nightly as needed for sleep initiation     Increase  Clonidine, 0.1mg, in the am and 1 at noon      Discontinue        The risks, benefits, side effects,

## 2023-12-04 NOTE — TELEPHONE ENCOUNTER
homicidality or psychosis observed. Patient is psychiatrically stable     Plan:     1. Continue    Effexor XR, 225mg, daily  Buspirone, 30mg, twice daily  Amitriptyline, 25mg, nightly  Melatonin, 10mg, nightly as needed for sleep initiation     Increase  Clonidine, 0.1mg, in the am and 1 at noon      Discontinue        The risks, benefits, side effects, indications, contraindications, and adverse effects of the medications have been discussed. Yes.  2. The pt has verbalized understanding and has capacity to give informed consent. 3. The [facility] report has been reviewed according to [facility] regulations. 4. Supportive therapy offered. 5. Follow up:    Return in about 6 months (around 2/16/2024). 6. The patient has been advised to call with any problems. 7. Controlled substance Treatment Plan: NA.  8. The above listed medications have been continued, modifications in meds and other orders/labs as follows:                 Encounter Medications        Orders Placed This Encounter   Medications    cloNIDine (CATAPRES) 0.1 MG tablet       Sig: Take 1 tablet by mouth daily AND 1 tablet Daily with lunch. Dispense:  180 tablet       Refill:  0                        No orders of the defined types were placed in this encounter. 9. Additional comments: Continue therapy, discussed sleep hygiene, discussed the use of coping skills and relaxation strategies to manage symptoms. 10.Over 50% of the total visit time of   30  minutes was spent on counseling and/or coordination of care of:                         1. Mild episode of recurrent major depressive disorder ([address])    2.  Generalized anxiety disorder                              Psychotherapy Topics: mood/medication effectiveness family, health, and occupational     Esteban Mcduffie, APRN - CNP

## 2024-02-13 NOTE — TELEPHONE ENCOUNTER
retardation/agitation appreciated.   Speech: Normal in tone, volume, and quality. No slurring, dysarthria or pressured speech noted.   Mood: \"really good\"   Affect: Mood congruent.   Thought Process: Appears linear, logical and goal oriented. Causality appears intact.   Thought Content: Denies active suicidal and homicidal ideations. No overt delusions or paranoia appreciated.   Perceptions: Denies auditory or visual hallucinations at present time. Not responding to internal stimuli.   Concentration: Intact.   Orientation: to person, place, date, and situation.   Language: Intact.   Fund of information: Intact.   Memory: Recent and remote appear intact.   Impulsivity: Limited.   Neurovegitative: Fair appetite and sleep.   Insight: Fair.   Judgment: Fair.     Cognition: Can spell \"world\" backwards: Yes                    Can do serial 7's: Yes     No results found for: NA, K, CL, CO2, BUN, CREATININE, GLUCOSE, CALCIUM, PROT, LABALBU, BILITOT, ALKPHOS, AST, ALT, LABGLOM, GFRAA, AGRATIO, GLOB  No results found for: NA, K, CL, CO2, BUN, CREATININE, GLUCOSE, CALCIUM   No results found for: CHOL  No results found for: TRIG  No results found for: HDL  No results found for: LDLCHOLESTEROL, LDLCALC  No results found for: LABVLDL, VLDL  No results found for: CHOLHDLRATIO  No results found for: LABA1C  No results found for: EAG  No results found for: TSHFT4, TSH  No results found for: VITD25  No results found for: [id number]   No results found for: FOLATE      Assessment:    1. Mild episode of recurrent major depressive disorder (HCC)    2. Generalized anxiety disorder                No evidence of acute suicidality, homicidality or psychosis observed.  Patient is psychiatrically stable     Plan:     1.   Continue    Effexor XR, 225mg, daily  Buspirone, 30mg, twice daily  Amitriptyline, 25mg, nightly  Melatonin, 10mg, nightly as needed for sleep initiation     Increase  Clonidine, 0.1mg, in the am and 1 at noon

## 2024-02-14 ENCOUNTER — TELEPHONE (OUTPATIENT)
Dept: PSYCHIATRY | Age: 44
End: 2024-02-14

## 2024-02-14 RX ORDER — CLONIDINE HYDROCHLORIDE 0.1 MG/1
TABLET ORAL
Qty: 180 TABLET | Refills: 0 | Status: SHIPPED | OUTPATIENT
Start: 2024-02-14

## 2024-02-14 NOTE — TELEPHONE ENCOUNTER
Called and let pt know that her script for clonidine was sent to her pharmacy     Electronically signed by Do Martinez on 2/14/2024 at 11:48 AM

## 2024-02-15 ENCOUNTER — TELEPHONE (OUTPATIENT)
Dept: PSYCHIATRY | Age: 44
End: 2024-02-15

## 2024-02-15 NOTE — TELEPHONE ENCOUNTER
Called and lvm reminding pt of her appt for 2/16 @ 3 PM      Electronically signed by Do Martinez on 2/15/2024 at 1:50 PM

## 2024-02-16 ENCOUNTER — OFFICE VISIT (OUTPATIENT)
Dept: PSYCHIATRY | Age: 44
End: 2024-02-16
Payer: COMMERCIAL

## 2024-02-16 DIAGNOSIS — F33.0 MILD EPISODE OF RECURRENT MAJOR DEPRESSIVE DISORDER (HCC): Primary | ICD-10-CM

## 2024-02-16 DIAGNOSIS — F41.1 GENERALIZED ANXIETY DISORDER: ICD-10-CM

## 2024-02-16 PROCEDURE — 99214 OFFICE O/P EST MOD 30 MIN: CPT | Performed by: NURSE PRACTITIONER

## 2024-02-16 NOTE — PROGRESS NOTES
Last Year: No     Number of Places Lived in the Last Year: 1     Unstable Housing in the Last Year: No       MSE:  Appearance: Appropriately groomed. Made good eye contact.  Gait stable.  No abnormal movements or tremor.   Behavior: Calm, cooperative, and socially appropriate. No psychomotor retardation/agitation appreciated.   Speech: Normal in tone, volume, and quality. No slurring, dysarthria or pressured speech noted.   Mood: \"pretty good\"   Affect: Mood congruent.   Thought Process: Appears linear, logical and goal oriented. Causality appears intact.   Thought Content: Denies active suicidal and homicidal ideations. No overt delusions or paranoia appreciated.   Perceptions: Denies auditory or visual hallucinations at present time. Not responding to internal stimuli.   Concentration: Intact.   Orientation: to person, place, date, and situation.   Language: Intact.   Fund of information: Intact.   Memory: Recent and remote appear intact.   Impulsivity: Limited.   Neurovegitative: Fair appetite and sleep.   Insight: Fair.   Judgment: Fair.    Cognition: Can spell \"world\" backwards: Yes                    Can do serial 7's: Yes    No results found for: \"NA\", \"K\", \"CL\", \"CO2\", \"BUN\", \"CREATININE\", \"GLUCOSE\", \"CALCIUM\", \"PROT\", \"LABALBU\", \"BILITOT\", \"ALKPHOS\", \"AST\", \"ALT\", \"LABGLOM\", \"GFRAA\", \"AGRATIO\", \"GLOB\"  No results found for: \"NA\", \"K\", \"CL\", \"CO2\", \"BUN\", \"CREATININE\", \"GLUCOSE\", \"CALCIUM\"   No results found for: \"CHOL\"  No results found for: \"TRIG\"  No results found for: \"HDL\"  No results found for: \"LDLCHOLESTEROL\", \"LDLCALC\"  No results found for: \"VLDL\"  No results found for: \"CHOLHDLRATIO\"  No results found for: \"LABA1C\"  No results found for: \"EAG\"  No results found for: \"TSHFT4\", \"TSH\"  No results found for: \"VITD25\"  No results found for: \"[id number]\"   No results found for: \"FOLATE\"     Assessment:   1. Mild episode of recurrent major depressive disorder (HCC)    2. Generalized anxiety disorder

## 2024-03-26 ENCOUNTER — TELEPHONE (OUTPATIENT)
Dept: PSYCHIATRY | Age: 44
End: 2024-03-26

## 2024-03-26 RX ORDER — BUSPIRONE HYDROCHLORIDE 30 MG/1
TABLET ORAL
Qty: 60 TABLET | Refills: 0 | Status: SHIPPED | OUTPATIENT
Start: 2024-03-26

## 2024-03-26 RX ORDER — VENLAFAXINE HYDROCHLORIDE 75 MG/1
CAPSULE, EXTENDED RELEASE ORAL
Qty: 90 CAPSULE | Refills: 0 | Status: SHIPPED | OUTPATIENT
Start: 2024-03-26

## 2024-03-26 RX ORDER — AMITRIPTYLINE HYDROCHLORIDE 25 MG/1
25 TABLET, FILM COATED ORAL NIGHTLY
Qty: 90 TABLET | Refills: 0 | Status: SHIPPED | OUTPATIENT
Start: 2024-03-26 | End: 2024-06-24

## 2024-03-26 RX ORDER — VENLAFAXINE HYDROCHLORIDE 150 MG/1
CAPSULE, EXTENDED RELEASE ORAL
Qty: 90 CAPSULE | Refills: 0 | Status: SHIPPED | OUTPATIENT
Start: 2024-03-26

## 2024-03-26 NOTE — TELEPHONE ENCOUNTER
Called and let pt know that her scripts for amitriptyline,buspar, effexor 75 mg and 150 mg was sent to her pharmacy    Electronically signed by Do Martinez on 3/26/2024 at 3:25 PM

## 2024-03-26 NOTE — TELEPHONE ENCOUNTER
time. Not responding to internal stimuli.   Concentration: Intact.   Orientation: to person, place, date, and situation.   Language: Intact.   Fund of information: Intact.   Memory: Recent and remote appear intact.   Impulsivity: Limited.   Neurovegitative: Fair appetite and sleep.   Insight: Fair.   Judgment: Fair.     Cognition: Can spell \"world\" backwards: Yes                    Can do serial 7's: Yes     No results found for: \"NA\", \"K\", \"CL\", \"CO2\", \"BUN\", \"CREATININE\", \"GLUCOSE\", \"CALCIUM\", \"PROT\", \"LABALBU\", \"BILITOT\", \"ALKPHOS\", \"AST\", \"ALT\", \"LABGLOM\", \"GFRAA\", \"AGRATIO\", \"GLOB\"  No results found for: \"NA\", \"K\", \"CL\", \"CO2\", \"BUN\", \"CREATININE\", \"GLUCOSE\", \"CALCIUM\"   No results found for: \"CHOL\"  No results found for: \"TRIG\"  No results found for: \"HDL\"  No results found for: \"LDLCHOLESTEROL\", \"LDLCALC\"  No results found for: \"VLDL\"  No results found for: \"CHOLHDLRATIO\"  No results found for: \"LABA1C\"  No results found for: \"EAG\"  No results found for: \"TSHFT4\", \"TSH\"  No results found for: \"VITD25\"  No results found for: \"[id number]\"   No results found for: \"FOLATE\"      Assessment:    1. Mild episode of recurrent major depressive disorder (HCC)    2. Generalized anxiety disorder                   No evidence of acute suicidality, homicidality or psychosis observed.  Patient is psychiatrically stable     Plan:     1.   Continue    Effexor XR, 225mg, daily  Buspirone, 30mg, twice daily  Amitriptyline, 25mg, nightly  Melatonin, 10mg, nightly as needed for sleep initiation  Clonidine, 0.1mg, in the am and 1 at noon            The risks, benefits, side effects, indications, contraindications, and adverse effects of the medications have been discussed. Yes.  2. The pt has verbalized understanding and has capacity to give informed consent.  3. The [facility] report has been reviewed according to [facility] regulations.  4. Supportive therapy offered.  5. Follow up:    Return in about 6 months (around 8/16/2024).  6. The

## 2024-04-22 RX ORDER — BUSPIRONE HYDROCHLORIDE 30 MG/1
TABLET ORAL
Qty: 60 TABLET | Refills: 1 | Status: SHIPPED | OUTPATIENT
Start: 2024-04-22

## 2024-04-22 NOTE — TELEPHONE ENCOUNTER
Pharmacy sent a request to refill pt's medication       Last office visit : 2/16/2024 LACY DILLARD  Next office visit : 8/22/2024 LACY DILLARD    Requested Prescriptions     Pending Prescriptions Disp Refills    busPIRone (BUSPAR) 30 MG tablet [Pharmacy Med Name: busPIRone HCl 30 MG Oral Tablet] 60 tablet 0     Sig: Take 1 tablet by mouth twice daily            Do Martinez          2/16/24                                            Progress Note     Jane Randall 1980                                 Chief Complaint   Patient presents with    Medication Check    Follow-up         Subjective:    Patient is a 43 y.o. female diagnosed with MDD and presents today for follow-up.  Last seen in clinic on 8/16/23 and prior records were reviewed.     Last visit: she has had some medical issues and has had to fight with insurance companies for coverage. She has had some issues with psoriasis as well as ear infections.  She has had some issues with her teeth breaking as well, she is going to have all of her teeth pulled and get dentures.  She is looking forward to getting her medical concerns addressed.    She has been working on getting her house cleaned up.  She says overall she is doing well, she has had some issues with focus and concentration.  She has tried taking an additional clonidine in the afternoon.  She has not noticed any tiredness or blood pressure issues.  We discussed increasing clonidine to twice daily, she is agreeable with medication adjustment.  We discussed rebound hypertension, she verbalized understanding and agreement.    She denies SI HI AVH  she reports that she is being controlled very well for depression and anxiety.  She denies side effects of medications we will follow-up in 6 months     Today: she has gotten her dentures and is happy with them.  She states she has had  some decreased appetite and decreased focus and concentration.  Overall she says she has been doing well.  No

## 2024-06-17 ENCOUNTER — OFFICE VISIT (OUTPATIENT)
Dept: CARDIOLOGY | Facility: CLINIC | Age: 44
End: 2024-06-17
Payer: COMMERCIAL

## 2024-06-17 VITALS
SYSTOLIC BLOOD PRESSURE: 101 MMHG | WEIGHT: 215 LBS | BODY MASS INDEX: 36.7 KG/M2 | HEIGHT: 64 IN | DIASTOLIC BLOOD PRESSURE: 70 MMHG | HEART RATE: 82 BPM

## 2024-06-17 DIAGNOSIS — E78.2 MIXED HYPERLIPIDEMIA: Chronic | ICD-10-CM

## 2024-06-17 DIAGNOSIS — E66.2 CLASS 2 OBESITY WITH ALVEOLAR HYPOVENTILATION, SERIOUS COMORBIDITY, AND BODY MASS INDEX (BMI) OF 36.0 TO 36.9 IN ADULT: ICD-10-CM

## 2024-06-17 DIAGNOSIS — Z72.0 TOBACCO ABUSE: Chronic | ICD-10-CM

## 2024-06-17 DIAGNOSIS — I25.10 CORONARY ARTERY DISEASE INVOLVING NATIVE CORONARY ARTERY OF NATIVE HEART WITHOUT ANGINA PECTORIS: Primary | Chronic | ICD-10-CM

## 2024-06-17 PROCEDURE — 93000 ELECTROCARDIOGRAM COMPLETE: CPT | Performed by: NURSE PRACTITIONER

## 2024-06-17 PROCEDURE — 99214 OFFICE O/P EST MOD 30 MIN: CPT | Performed by: NURSE PRACTITIONER

## 2024-06-17 RX ORDER — ATORVASTATIN CALCIUM 80 MG/1
80 TABLET, FILM COATED ORAL
Qty: 90 TABLET | Refills: 3 | Status: SHIPPED | OUTPATIENT
Start: 2024-06-17

## 2024-06-17 NOTE — PROGRESS NOTES
Subjective:     Encounter Date:06/17/2024      Patient ID: Leidy Lerma is a 43 y.o. female with known coronary artery disease, prior RCA PCI in 2017 with AMI presentation, hyperlipidemia, tobacco abuse, obesity who presents today for routine follow up.     Chief Complaint: CAD Follow Up  Coronary Artery Disease  Presents for follow-up visit. Pertinent negatives include no chest pain, chest pressure, chest tightness, dizziness, leg swelling, palpitations or shortness of breath. The symptoms have been stable. Compliance with diet is good. Compliance with exercise is good. Compliance with medications is variable.     Ms. eLrma presents today for routine follow up. She reports to be feeling well. She has no cardiac complaints today. She reports compliance with her home medicines. She sees her PCP regularly.  She denies any chest pain, chest pressure, or tightness. She has no shortness of breath, dizziness, lightheadedness, palpitations, or fatigue.  She continues to smoke cigarettes.  She smokes approximately 1 pack/day.  She reports occasional use of marijuana.  Overall she has felt well without any complaints.  She has had a weight loss since her last visit.      The following portions of the patient's history were reviewed and updated as appropriate: allergies, current medications, past family history, past medical history, past social history, and past surgical history.    Allergies   Allergen Reactions    Penicillins Hives       Current Outpatient Medications:     amitriptyline (ELAVIL) 25 MG tablet, Take 1 tablet by mouth Every Night., Disp: , Rfl:     aspirin 81 MG EC tablet, Take 1 tablet by mouth Daily., Disp: 30 tablet, Rfl: 11    atorvastatin (LIPITOR) 80 MG tablet, Take 1 tablet by mouth every night at bedtime., Disp: 90 tablet, Rfl: 3    busPIRone (BUSPAR) 30 MG tablet, Take 1 tablet by mouth 2 (Two) Times a Day., Disp: , Rfl:     cloNIDine (CATAPRES) 0.1 MG tablet, Take 1 tablet by mouth Every Night.,  Disp: , Rfl:     metoprolol tartrate (LOPRESSOR) 25 MG tablet, Take 1 tablet by mouth Every 12 (Twelve) Hours., Disp: 180 tablet, Rfl: 3    nitroglycerin (NITROSTAT) 0.4 MG SL tablet, Place 1 tablet under the tongue Every 5 (Five) Minutes As Needed for Chest Pain. Take no more than 3 doses in 15 minutes., Disp: 30 tablet, Rfl: 12    VENLAFAXINE HCL PO, Take 150 mg by mouth Daily., Disp: , Rfl:     Review of Systems   Constitutional: Negative for malaise/fatigue.   Cardiovascular:  Negative for chest pain, dyspnea on exertion, irregular heartbeat, leg swelling, near-syncope, palpitations, paroxysmal nocturnal dyspnea and syncope.   Respiratory:  Negative for chest tightness, cough, shortness of breath and wheezing.    Hematologic/Lymphatic: Negative for bleeding problem.   Gastrointestinal:  Negative for nausea and vomiting.   Neurological:  Negative for dizziness, focal weakness, headaches, light-headedness and weakness.   Psychiatric/Behavioral:  Negative for altered mental status.          ECG 12 Lead    Date/Time: 6/17/2024 1:47 PM  Performed by: Magy Richard APRN    Authorized by: Magy Richard APRN  Comparison: compared with previous ECG from 5/31/2023  Similar to previous ECG  Rhythm: sinus rhythm  Rate: normal  BPM: 82  Conduction: conduction normal  ST Segments: ST segments normal  T Waves: T waves normal  QRS axis: normal    Clinical impression: normal ECG           Objective:     Vitals reviewed.   Constitutional:       General: Not in acute distress.     Appearance: Normal and healthy appearance. Well-developed, well-groomed and not in distress. Obese.   HENT:      Head: Normocephalic and atraumatic.   Pulmonary:      Effort: Pulmonary effort is normal.      Breath sounds: Normal breath sounds. No wheezing. No rhonchi. No rales.   Cardiovascular:      Normal rate. Regular rhythm.      Murmurs: There is no murmur.      No gallop.  No rub.   Edema:     Peripheral edema absent.   Musculoskeletal:       "Cervical back: Normal range of motion and neck supple. Skin:     General: Skin is warm and dry.   Neurological:      Mental Status: Alert, oriented to person, place, and time and oriented to person, place and time.   Psychiatric:         Attention and Perception: Attention normal.         Mood and Affect: Mood normal.         Speech: Speech normal.         Behavior: Behavior normal. Behavior is cooperative.         Thought Content: Thought content normal.         Cognition and Memory: Cognition normal.         Judgment: Judgment normal.       /70   Pulse 82   Ht 162.6 cm (64\")   Wt 97.5 kg (215 lb)   BMI 36.90 kg/m²       Lab Review:     Cardiac Catheterization October 2017 ([place])  Impression:  1. Acute inferior ST segment elevation myocardial infarction due to right coronary artery occlusion/stenosis, now status post successful percutaneous coronary intervention to the mid right coronary artery with Xience drug eluting stent.  2.  Mild disease in the left anterior descending artery and left circumflex.    Results for orders placed during the hospital encounter of 10/05/17    Adult Transthoracic Echo Complete W/ Cont if Necessary Per Protocol    Interpretation Summary  · Left ventricular systolic function is normal. Estimated EF appears to be in the range of 56 - 60%.  · The following left ventricular wall segments are hypokinetic: basal inferior.  · Left ventricular wall thickness is consistent with borderline concentric hypertrophy.  · Trace tricuspid valve regurgitation is present. Estimated right ventricular systolic pressure from tricuspid regurgitation is normal (<35 mmHg).          Assessment:          Diagnosis Plan   1. Coronary artery disease involving native coronary artery of native heart without angina pectoris        2. Mixed hyperlipidemia        3. Tobacco abuse        4. Class 2 obesity with alveolar hypoventilation, serious comorbidity, and body mass index (BMI) of 36.0 to 36.9 in " adult                 Plan:       CAD: Stable.  Feels well.  Denies shortness of breath or chest pain.  History of acute myocardial infarction in 2017.  Continues on appropriate medical therapy with aspirin, statin, beta-blocker.  No changes today.    HLD: Goal LDL less than 70 given known coronary artery disease.  Currently managed on statin therapy.  Her labs are drawn through his primary care office.    Tobacco Abuse: Daily Smoker, smoking cessation advised.     Obesity: BMI 36.90 -ongoing recommendations of diet and exercise.  Has had weight loss since her last visit.      Continue current medications.  1 year follow up, call sooner if needed.  Smoking cessation advised.       I attest that all portions of this note have been reviewed and updated to reflect the patient's current status.

## 2024-06-26 ENCOUNTER — TELEPHONE (OUTPATIENT)
Dept: PSYCHIATRY | Age: 44
End: 2024-06-26

## 2024-06-26 NOTE — TELEPHONE ENCOUNTER
Called pt to cancel/reschedule appt for 08/22/24 with Chance Pinedo because the provider will not be in the office that day.    No answer and LVM.  Cancelled appt.    Electronically signed by Licha Pulido MA on 6/26/2024 at 4:30 PM

## 2024-07-18 ENCOUNTER — TELEPHONE (OUTPATIENT)
Dept: PSYCHIATRY | Age: 44
End: 2024-07-18

## 2024-07-18 RX ORDER — CLONIDINE HYDROCHLORIDE 0.1 MG/1
TABLET ORAL
Qty: 180 TABLET | Refills: 0 | OUTPATIENT
Start: 2024-07-18

## 2024-07-18 RX ORDER — VENLAFAXINE HYDROCHLORIDE 75 MG/1
CAPSULE, EXTENDED RELEASE ORAL
Qty: 90 CAPSULE | Refills: 0 | OUTPATIENT
Start: 2024-07-18

## 2024-07-18 RX ORDER — VENLAFAXINE HYDROCHLORIDE 150 MG/1
CAPSULE, EXTENDED RELEASE ORAL
Qty: 90 CAPSULE | Refills: 0 | OUTPATIENT
Start: 2024-07-18

## 2024-07-18 NOTE — TELEPHONE ENCOUNTER
Called and lvm asking pt to return phone call to schedule an appt     Electronically signed by Do Martinez on 7/18/2024 at 9:55 AM

## 2024-07-30 ENCOUNTER — TELEPHONE (OUTPATIENT)
Dept: PSYCHIATRY | Age: 44
End: 2024-07-30

## 2024-07-30 NOTE — TELEPHONE ENCOUNTER
Called pt on 07/29/24 for an appointment reminder for 07/31/24. Pt confirmed   ?   Reminded patient to complete their visit pre-check/digital registration in [employer]).

## 2024-07-31 ENCOUNTER — TELEPHONE (OUTPATIENT)
Dept: PSYCHIATRY | Age: 44
End: 2024-07-31

## 2024-07-31 ENCOUNTER — OFFICE VISIT (OUTPATIENT)
Dept: PSYCHIATRY | Age: 44
End: 2024-07-31
Payer: COMMERCIAL

## 2024-07-31 VITALS
BODY MASS INDEX: 36.54 KG/M2 | SYSTOLIC BLOOD PRESSURE: 138 MMHG | WEIGHT: 214 LBS | OXYGEN SATURATION: 96 % | TEMPERATURE: 97.9 F | HEIGHT: 64 IN | HEART RATE: 80 BPM | DIASTOLIC BLOOD PRESSURE: 89 MMHG

## 2024-07-31 DIAGNOSIS — F41.1 GENERALIZED ANXIETY DISORDER: ICD-10-CM

## 2024-07-31 DIAGNOSIS — Z56.6 STRESS AT WORK: ICD-10-CM

## 2024-07-31 DIAGNOSIS — R41.840 ATTENTION AND CONCENTRATION DEFICIT: ICD-10-CM

## 2024-07-31 DIAGNOSIS — F33.1 MODERATE EPISODE OF RECURRENT MAJOR DEPRESSIVE DISORDER (HCC): Primary | ICD-10-CM

## 2024-07-31 DIAGNOSIS — G47.01 INSOMNIA DUE TO MEDICAL CONDITION: ICD-10-CM

## 2024-07-31 PROCEDURE — 99214 OFFICE O/P EST MOD 30 MIN: CPT

## 2024-07-31 RX ORDER — AMITRIPTYLINE HYDROCHLORIDE 25 MG/1
25 TABLET, FILM COATED ORAL NIGHTLY
Qty: 90 TABLET | Refills: 0 | Status: SHIPPED | OUTPATIENT
Start: 2024-07-31 | End: 2024-10-29

## 2024-07-31 RX ORDER — CLONIDINE HYDROCHLORIDE 0.1 MG/1
TABLET ORAL
Qty: 180 TABLET | Refills: 0 | Status: SHIPPED | OUTPATIENT
Start: 2024-07-31

## 2024-07-31 SDOH — HEALTH STABILITY - MENTAL HEALTH: OTHER PHYSICAL AND MENTAL STRAIN RELATED TO WORK: Z56.6

## 2024-07-31 ASSESSMENT — PATIENT HEALTH QUESTIONNAIRE - PHQ9
2. FEELING DOWN, DEPRESSED OR HOPELESS: NEARLY EVERY DAY
3. TROUBLE FALLING OR STAYING ASLEEP: NEARLY EVERY DAY
5. POOR APPETITE OR OVEREATING: NEARLY EVERY DAY
SUM OF ALL RESPONSES TO PHQ QUESTIONS 1-9: 20
SUM OF ALL RESPONSES TO PHQ QUESTIONS 1-9: 21
SUM OF ALL RESPONSES TO PHQ QUESTIONS 1-9: 21
7. TROUBLE CONCENTRATING ON THINGS, SUCH AS READING THE NEWSPAPER OR WATCHING TELEVISION: NEARLY EVERY DAY
1. LITTLE INTEREST OR PLEASURE IN DOING THINGS: NEARLY EVERY DAY
10. IF YOU CHECKED OFF ANY PROBLEMS, HOW DIFFICULT HAVE THESE PROBLEMS MADE IT FOR YOU TO DO YOUR WORK, TAKE CARE OF THINGS AT HOME, OR GET ALONG WITH OTHER PEOPLE: EXTREMELY DIFFICULT
SUM OF ALL RESPONSES TO PHQ9 QUESTIONS 1 & 2: 6
SUM OF ALL RESPONSES TO PHQ QUESTIONS 1-9: 21
8. MOVING OR SPEAKING SO SLOWLY THAT OTHER PEOPLE COULD HAVE NOTICED. OR THE OPPOSITE, BEING SO FIGETY OR RESTLESS THAT YOU HAVE BEEN MOVING AROUND A LOT MORE THAN USUAL: SEVERAL DAYS
4. FEELING TIRED OR HAVING LITTLE ENERGY: NEARLY EVERY DAY
9. THOUGHTS THAT YOU WOULD BE BETTER OFF DEAD, OR OF HURTING YOURSELF: SEVERAL DAYS
6. FEELING BAD ABOUT YOURSELF - OR THAT YOU ARE A FAILURE OR HAVE LET YOURSELF OR YOUR FAMILY DOWN: SEVERAL DAYS

## 2024-07-31 ASSESSMENT — COLUMBIA-SUICIDE SEVERITY RATING SCALE - C-SSRS
1. WITHIN THE PAST MONTH, HAVE YOU WISHED YOU WERE DEAD OR WISHED YOU COULD GO TO SLEEP AND NOT WAKE UP?: NO
6. HAVE YOU EVER DONE ANYTHING, STARTED TO DO ANYTHING, OR PREPARED TO DO ANYTHING TO END YOUR LIFE?: NO
2. HAVE YOU ACTUALLY HAD ANY THOUGHTS OF KILLING YOURSELF?: NO

## 2024-07-31 NOTE — PROGRESS NOTES
7/31/24        Progress Note    Jane Randall 1980      Chief Complaint   Patient presents with    Medication Check    Follow-up         Subjective:    Patient is a 43 y.o. female diagnosed with MDD, CANDE, insomnia and presents today for follow-up.  Last seen in clinic on 2/16/24 with Chance Pinedo  and prior records were reviewed.    Seen in office.  Tearful, crying at times throughout interview.  Recently saw Chance Pinedo in office, new to provider today.  Patient reports she has been under a lot of stress lately, occupational, family, health.  Patient has psoriasis, has had a bad outbreak, due to swapping insurances at the beginning of the year not being able to get her medication approved.  She reports this put a lot of stress on her as it increases pain in daily life due to severe outbreak on skin.  She has also been losing some of her bottom teeth, breaking off and having to have them pulled.  She had her upper teeth removed last year and received dentures.  She reports her father had to have immediate open heart surgery after finding a problem when he was supposed to have a joint replacement surgery.  She reports he is still in the hospital.  Having to help care for him, as she is getting little support from his wife her stepmother.  She reports a lot of occupational stress as well, works as an , for [employer].  She takes pride in her job, however she was left with few messes that she has had to clean up with billing, and then all of her additional stress has really taken a toll on her.  She reports decreased mood, increased anxiety, poor sleep, increased stress has affected daily function.  She requests that provider fill out FMLA paperwork to give her a break from work while dealing with other stressors including family.  Provider agreeable to Short-term FMLA and get her started with psychotherapy to help with increased stressors, patient agreeable with plan.  She reports

## 2024-07-31 NOTE — TELEPHONE ENCOUNTER
SW contacted patient to inform patient that [facility] paperwork has been faxed and that we have received a confirmation notification that the fax was successful. Encouraged patient to follow-up accordingly.

## 2024-08-09 ENCOUNTER — TELEPHONE (OUTPATIENT)
Dept: PSYCHIATRY | Age: 44
End: 2024-08-09

## 2024-08-09 NOTE — TELEPHONE ENCOUNTER
Sent referral to [facility] for therapy.     Electronically signed by Licha Pulido MA on 8/9/2024 at 4:47 PM

## 2024-08-27 ENCOUNTER — TELEPHONE (OUTPATIENT)
Dept: PSYCHIATRY | Age: 44
End: 2024-08-27

## 2024-08-27 NOTE — TELEPHONE ENCOUNTER
Called and confirmed appt with pt for 8/28 @ 9:30 AM      Electronically signed by Do Martinez on 8/27/2024 at 10:19 AM

## 2024-08-28 ENCOUNTER — OFFICE VISIT (OUTPATIENT)
Dept: PSYCHIATRY | Age: 44
End: 2024-08-28
Payer: COMMERCIAL

## 2024-08-28 VITALS
WEIGHT: 215.2 LBS | BODY MASS INDEX: 36.74 KG/M2 | SYSTOLIC BLOOD PRESSURE: 114 MMHG | HEIGHT: 64 IN | OXYGEN SATURATION: 96 % | HEART RATE: 88 BPM | DIASTOLIC BLOOD PRESSURE: 80 MMHG | TEMPERATURE: 98 F

## 2024-08-28 DIAGNOSIS — F41.1 GENERALIZED ANXIETY DISORDER: ICD-10-CM

## 2024-08-28 DIAGNOSIS — R41.840 ATTENTION AND CONCENTRATION DEFICIT: ICD-10-CM

## 2024-08-28 DIAGNOSIS — F33.1 MODERATE EPISODE OF RECURRENT MAJOR DEPRESSIVE DISORDER (HCC): Primary | ICD-10-CM

## 2024-08-28 DIAGNOSIS — G47.01 INSOMNIA DUE TO MEDICAL CONDITION: ICD-10-CM

## 2024-08-28 PROCEDURE — 99214 OFFICE O/P EST MOD 30 MIN: CPT

## 2024-08-28 ASSESSMENT — COLUMBIA-SUICIDE SEVERITY RATING SCALE - C-SSRS
6. HAVE YOU EVER DONE ANYTHING, STARTED TO DO ANYTHING, OR PREPARED TO DO ANYTHING TO END YOUR LIFE?: NO
1. WITHIN THE PAST MONTH, HAVE YOU WISHED YOU WERE DEAD OR WISHED YOU COULD GO TO SLEEP AND NOT WAKE UP?: NO
2. HAVE YOU ACTUALLY HAD ANY THOUGHTS OF KILLING YOURSELF?: NO

## 2024-08-28 ASSESSMENT — PATIENT HEALTH QUESTIONNAIRE - PHQ9
SUM OF ALL RESPONSES TO PHQ QUESTIONS 1-9: 12
2. FEELING DOWN, DEPRESSED OR HOPELESS: MORE THAN HALF THE DAYS
SUM OF ALL RESPONSES TO PHQ QUESTIONS 1-9: 12
5. POOR APPETITE OR OVEREATING: NEARLY EVERY DAY
3. TROUBLE FALLING OR STAYING ASLEEP: MORE THAN HALF THE DAYS
4. FEELING TIRED OR HAVING LITTLE ENERGY: SEVERAL DAYS
SUM OF ALL RESPONSES TO PHQ QUESTIONS 1-9: 12
SUM OF ALL RESPONSES TO PHQ QUESTIONS 1-9: 12
SUM OF ALL RESPONSES TO PHQ9 QUESTIONS 1 & 2: 5
7. TROUBLE CONCENTRATING ON THINGS, SUCH AS READING THE NEWSPAPER OR WATCHING TELEVISION: SEVERAL DAYS
10. IF YOU CHECKED OFF ANY PROBLEMS, HOW DIFFICULT HAVE THESE PROBLEMS MADE IT FOR YOU TO DO YOUR WORK, TAKE CARE OF THINGS AT HOME, OR GET ALONG WITH OTHER PEOPLE: VERY DIFFICULT
8. MOVING OR SPEAKING SO SLOWLY THAT OTHER PEOPLE COULD HAVE NOTICED. OR THE OPPOSITE, BEING SO FIGETY OR RESTLESS THAT YOU HAVE BEEN MOVING AROUND A LOT MORE THAN USUAL: NOT AT ALL
9. THOUGHTS THAT YOU WOULD BE BETTER OFF DEAD, OR OF HURTING YOURSELF: NOT AT ALL
6. FEELING BAD ABOUT YOURSELF - OR THAT YOU ARE A FAILURE OR HAVE LET YOURSELF OR YOUR FAMILY DOWN: NOT AT ALL
1. LITTLE INTEREST OR PLEASURE IN DOING THINGS: NEARLY EVERY DAY

## 2024-08-28 NOTE — PROGRESS NOTES
8/28/24        Progress Note    Jane Tonia 1980      Chief Complaint   Patient presents with    Follow-up         Subjective:    Patient is a 43 y.o. female diagnosed with MDD, CANDE, insomnia and presents today for follow-up.  Last seen in clinic on 7/31/24  and prior records were reviewed.    Seen in office.  Calm and cooperative.  Affect is brighter today.  Currently taking Effexor, buspirone, amitriptyline.  Reports compliance with medications, denies any negative unwanted side effects.  She has been off from her job, they had her use up her PTO and currently on FMLA.  She is still been fighting with insurance on covering her psoriasis medication, she was told that they would not cover the medication, she is going to wait for insurance to lapse, will be quitting this job after she wraps up some accounts, look for something different about her insurance coverage to get her medication approved or will then qualify for 's discounts at least.  She reports she has been helping with her father, had open-heart surgery recently, has been having some family issues with her stepmother, \"she acts like she is helpless, but  you would be proud of me, I did set some boundaries, so she is not taking advantage\".  We processed her feelings, supportive psychotherapy provided.  She has to call [facility] to get therapy scheduled, they did reach out to her last week but she missed the call.  She reports being off work has been hard as she is used to being so busy, however is trying to stay productive, has been crocheting more, even crocheting some clothing items, as well as getting some of her house cleaned up from where she inherited many items from different family members.  She is sleeping okay, appetite is good.  She talks excitedly about some friends moving back to the area from [place] that she has known since she was young, they are a good support system for her.  We will follow up in 1  System    Family and Community Support    Received from [facility]    Abuse Screen    Received from [facility]    Housing Stability       MSE:  Appearance: Appropriately groomed. Made good eye contact.  Gait stable.  No abnormal movements or tremor.   Behavior: Tearful, cooperative, and socially appropriate. No psychomotor retardation/agitation appreciated.   Speech: Normal in tone, volume, and quality. No slurring, dysarthria or pressured speech noted.   Mood: \"lot of changes happening but I'm doing okay\"   Affect: Mood congruent.   Thought Process: Appears linear, logical and goal oriented. Causality appears intact.   Thought Content: Denies active suicidal and homicidal ideations. No overt delusions or paranoia appreciated.   Perceptions: Denies auditory or visual hallucinations at present time. Not responding to internal stimuli.   Concentration: Intact.   Orientation: to person, place, date, and situation.   Language: Intact.   Fund of information: Intact.   Memory: Recent and remote appear intact.   Impulsivity: Limited.   Neurovegitative: Fair appetite and sleep.   Insight: Fair.   Judgment: Fair.    Cognition: Can spell \"world\" backwards: Yes                    Can do serial 7's: Yes    No results found for: \"NA\", \"K\", \"CL\", \"CO2\", \"BUN\", \"CREATININE\", \"GLUCOSE\", \"CALCIUM\", \"LABALBU\", \"BILITOT\", \"ALKPHOS\", \"AST\", \"ALT\", \"LABGLOM\", \"GFRAA\", \"AGRATIO\", \"GLOB\"  No results found for: \"NA\", \"K\", \"CL\", \"CO2\", \"BUN\", \"CREATININE\", \"GLUCOSE\", \"CALCIUM\"   No results found for: \"CHOL\"  No results found for: \"TRIG\"  No results found for: \"HDL\"  No components found for: \"LDLCHOLESTEROL\", \"LDLCALC\"  No results found for: \"VLDL\"  No results found for: \"CHOLHDLRATIO\"  No results found for: \"LABA1C\"  No results found for: \"EAG\"  No results found for: \"TSHFT4\", \"TSH\"  No results found for: \"VITD25\"  No results found for: \"PWIPEJMX82\"   No

## 2024-09-11 ENCOUNTER — TELEPHONE (OUTPATIENT)
Dept: PSYCHIATRY | Age: 44
End: 2024-09-11

## 2024-10-02 ENCOUNTER — TELEPHONE (OUTPATIENT)
Dept: PSYCHIATRY | Age: 44
End: 2024-10-02

## 2024-10-02 NOTE — TELEPHONE ENCOUNTER
Called patient to remind them of their appointment     -Pt confirmed  Reminded patient to complete their visit pre-check/digital registration in [email or web address].    Electronically signed by Licha Pulido MA on 10/2/2024 at 3:38 PM

## 2024-10-03 ENCOUNTER — OFFICE VISIT (OUTPATIENT)
Dept: PSYCHIATRY | Age: 44
End: 2024-10-03

## 2024-10-03 VITALS
BODY MASS INDEX: 36.74 KG/M2 | SYSTOLIC BLOOD PRESSURE: 130 MMHG | WEIGHT: 215.2 LBS | HEART RATE: 93 BPM | HEIGHT: 64 IN | TEMPERATURE: 98 F | DIASTOLIC BLOOD PRESSURE: 87 MMHG | OXYGEN SATURATION: 96 %

## 2024-10-03 DIAGNOSIS — F41.1 GENERALIZED ANXIETY DISORDER: ICD-10-CM

## 2024-10-03 DIAGNOSIS — G47.01 INSOMNIA DUE TO MEDICAL CONDITION: ICD-10-CM

## 2024-10-03 DIAGNOSIS — F33.0 MILD EPISODE OF RECURRENT MAJOR DEPRESSIVE DISORDER (HCC): Primary | ICD-10-CM

## 2024-10-03 PROCEDURE — 99214 OFFICE O/P EST MOD 30 MIN: CPT

## 2024-10-03 RX ORDER — VENLAFAXINE HYDROCHLORIDE 150 MG/1
CAPSULE, EXTENDED RELEASE ORAL
Qty: 90 CAPSULE | Refills: 0 | Status: SHIPPED | OUTPATIENT
Start: 2024-10-03

## 2024-10-03 RX ORDER — CLONIDINE HYDROCHLORIDE 0.1 MG/1
TABLET ORAL
Qty: 180 TABLET | Refills: 0 | Status: SHIPPED | OUTPATIENT
Start: 2024-10-03

## 2024-10-03 RX ORDER — BUSPIRONE HYDROCHLORIDE 30 MG/1
TABLET ORAL
Qty: 60 TABLET | Refills: 1 | Status: SHIPPED | OUTPATIENT
Start: 2024-10-03

## 2024-10-03 RX ORDER — VENLAFAXINE HYDROCHLORIDE 75 MG/1
CAPSULE, EXTENDED RELEASE ORAL
Qty: 90 CAPSULE | Refills: 0 | Status: SHIPPED | OUTPATIENT
Start: 2024-10-03

## 2024-10-03 ASSESSMENT — PATIENT HEALTH QUESTIONNAIRE - PHQ9
SUM OF ALL RESPONSES TO PHQ9 QUESTIONS 1 & 2: 2
SUM OF ALL RESPONSES TO PHQ QUESTIONS 1-9: 11
5. POOR APPETITE OR OVEREATING: MORE THAN HALF THE DAYS
3. TROUBLE FALLING OR STAYING ASLEEP: SEVERAL DAYS
SUM OF ALL RESPONSES TO PHQ QUESTIONS 1-9: 10
1. LITTLE INTEREST OR PLEASURE IN DOING THINGS: SEVERAL DAYS
6. FEELING BAD ABOUT YOURSELF - OR THAT YOU ARE A FAILURE OR HAVE LET YOURSELF OR YOUR FAMILY DOWN: SEVERAL DAYS
SUM OF ALL RESPONSES TO PHQ QUESTIONS 1-9: 11
SUM OF ALL RESPONSES TO PHQ QUESTIONS 1-9: 11
4. FEELING TIRED OR HAVING LITTLE ENERGY: SEVERAL DAYS
7. TROUBLE CONCENTRATING ON THINGS, SUCH AS READING THE NEWSPAPER OR WATCHING TELEVISION: NEARLY EVERY DAY
9. THOUGHTS THAT YOU WOULD BE BETTER OFF DEAD, OR OF HURTING YOURSELF: SEVERAL DAYS
8. MOVING OR SPEAKING SO SLOWLY THAT OTHER PEOPLE COULD HAVE NOTICED. OR THE OPPOSITE, BEING SO FIGETY OR RESTLESS THAT YOU HAVE BEEN MOVING AROUND A LOT MORE THAN USUAL: NOT AT ALL
2. FEELING DOWN, DEPRESSED OR HOPELESS: SEVERAL DAYS

## 2024-10-03 NOTE — PROGRESS NOTES
mood/medication effectiveness family and occupational    Brittany Jenkins, APRN - CNP    This dictation was generated by voice recognition computer software.  Although all attempts are made to edit the dictation for accuracy, there may be errors in the transcription that are not intended.

## 2024-10-07 ENCOUNTER — TELEPHONE (OUTPATIENT)
Dept: PSYCHIATRY | Age: 44
End: 2024-10-07

## 2024-10-07 NOTE — TELEPHONE ENCOUNTER
Called [facility] to check on the status of the referral placed by [facility]. They stated they did not referral at this time and are requesting referral to be resent

## 2024-10-08 ENCOUNTER — TELEPHONE (OUTPATIENT)
Dept: PSYCHIATRY | Age: 44
End: 2024-10-08

## 2024-12-06 ENCOUNTER — TELEPHONE (OUTPATIENT)
Dept: PSYCHIATRY | Age: 44
End: 2024-12-06

## 2024-12-06 NOTE — TELEPHONE ENCOUNTER
MA called [facility] to get an update on the referral our office sent out to them for therapy.    Dolly Avila has reached out twice to try to schedule an appt and there hasn't been any response back.    Notified the provider and it is up to the pt to call and schedule an appt.    Electronically signed by Licha Pulido MA on 12/6/2024 at 10:17 AM

## 2024-12-12 ENCOUNTER — TELEPHONE (OUTPATIENT)
Dept: PSYCHIATRY | Age: 44
End: 2024-12-12

## 2024-12-12 NOTE — TELEPHONE ENCOUNTER
Pt called and lvm wanting to her appt for today. Pt thought her appt was at 2:30 pm.    MA called pt to let pt know that her appt was at 3:30 pm not 2:30 pm . Pt sent MA straight to voicemail. MA  lvm letting pt know that her appt is at 3:30pm not 2:30 pm. Appt was cancelled.    Electronically signed by Do Martinez on 12/12/2024 at 2:58 PM

## 2025-02-21 DIAGNOSIS — F41.1 GENERALIZED ANXIETY DISORDER: ICD-10-CM

## 2025-02-21 RX ORDER — CLONIDINE HYDROCHLORIDE 0.1 MG/1
TABLET ORAL
Qty: 180 TABLET | Refills: 0 | Status: SHIPPED | OUTPATIENT
Start: 2025-02-21

## 2025-02-21 NOTE — TELEPHONE ENCOUNTER
Pharmacy sent a request to refill pt's medication       Last office visit : 10/3/2024 NINI DILLARD  Next office visit : 2/24/2025 S Oscar GARCIA    Requested Prescriptions     Pending Prescriptions Disp Refills    cloNIDine (CATAPRES) 0.1 MG tablet 180 tablet 0     Sig: TAKE 1 TABLET BY MOUTH TWICE DAILY IN THE MORNING AND AT NOON            Shameca Michelle        10/3/2024                                         Progress Note     Jane Tonia 1980                                 Chief Complaint   Patient presents with    Follow-up            Subjective:    Patient is a 44 y.o. female diagnosed with MDD, CANDE, insomnia and presents today for follow-up.  Last seen in clinic on 8/28/24  and prior records were reviewed.     Seen in office.  Calm and cooperative.  Affect is brighter today.  Currently taking Effexor, buspirone, amitriptyline.  Reports compliance with medications, denies any negative unwanted side effects.    Her family ended on 9/30/2024, went to work the next day, \"and they started their crap again so I quit, and I am so glad I did.\"  We processed her feelings, supportive psychotherapy provided.  She has already been applying for a few other positions.  She is already letting her insurance lapse as she needed to be able to apply for discount program for her psoriasis medication that would not be covered under her commercial insurance at her job.  She reports \"scary because of been at that job for 16 years, but as better as I feel after quitting I feel I definitely made the right decision\".  She reports she has been sleeping well, appetite is good.  She is in therapy now.  Her father has been improving since his heart surgery.  She has been getting along better with stepmother, still keeps her boundaries and place this has helped overall she reports.  Patient was informed provider will be leaving office in a couple months, will be set up with another provider in office, will follow up in 2 months.

## 2025-02-24 ENCOUNTER — OFFICE VISIT (OUTPATIENT)
Dept: PSYCHIATRY | Age: 45
End: 2025-02-24

## 2025-02-24 VITALS
HEIGHT: 64 IN | HEART RATE: 93 BPM | SYSTOLIC BLOOD PRESSURE: 119 MMHG | TEMPERATURE: 97.3 F | DIASTOLIC BLOOD PRESSURE: 79 MMHG | BODY MASS INDEX: 39.54 KG/M2 | OXYGEN SATURATION: 97 % | WEIGHT: 231.6 LBS

## 2025-02-24 DIAGNOSIS — G47.01 INSOMNIA DUE TO MEDICAL CONDITION: ICD-10-CM

## 2025-02-24 DIAGNOSIS — F41.1 GENERALIZED ANXIETY DISORDER: ICD-10-CM

## 2025-02-24 DIAGNOSIS — F33.0 MILD EPISODE OF RECURRENT MAJOR DEPRESSIVE DISORDER: ICD-10-CM

## 2025-02-24 DIAGNOSIS — F33.0 MILD EPISODE OF RECURRENT MAJOR DEPRESSIVE DISORDER: Primary | ICD-10-CM

## 2025-02-24 PROCEDURE — 99215 OFFICE O/P EST HI 40 MIN: CPT | Performed by: PSYCHIATRY & NEUROLOGY

## 2025-02-24 RX ORDER — VENLAFAXINE HYDROCHLORIDE 75 MG/1
CAPSULE, EXTENDED RELEASE ORAL
Qty: 90 CAPSULE | Refills: 0 | Status: SHIPPED | OUTPATIENT
Start: 2025-02-24

## 2025-02-24 RX ORDER — VENLAFAXINE HYDROCHLORIDE 150 MG/1
CAPSULE, EXTENDED RELEASE ORAL
Qty: 90 CAPSULE | Refills: 0 | Status: SHIPPED | OUTPATIENT
Start: 2025-02-24

## 2025-02-24 RX ORDER — HYDROXYZINE HYDROCHLORIDE 25 MG/1
25 TABLET, FILM COATED ORAL 3 TIMES DAILY PRN
Qty: 60 TABLET | Refills: 3 | Status: SHIPPED | OUTPATIENT
Start: 2025-02-24 | End: 2025-03-26

## 2025-02-24 NOTE — PROGRESS NOTES
2/24/2025 3:30 PM   Progress Note          Jane Hamptonn 1980      Chief Complaint   Patient presents with    Depression    Anxiety    Insomnia         Subjective:    44 y.o. white female diagnosed with MDD, CANDE, insomnia, psoriatic arthritis, VINOD - not on CPAP, presents today for follow-up. On Elavil, Effexor, Buspar, Clonidine, melatonin. No side effects.     Pt is calm and cooperative. 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 Looking for a job. Low mood. Some anxiety. Sleep is poor. She is coping well. In therapy. 2 kids - will be graduating HS.         Objective:      /79   Pulse 93   Temp 97.3 °F (36.3 °C)   Ht 1.626 m (5' 4\")   Wt 105.1 kg (231 lb 9.6 oz)   SpO2 97%   BMI 39.75 kg/m²       Review of Systems - 14 point review:  Negative except for    Constitutional: (fevers, chills, night sweats, wt loss/gain, change in appetite, fatigue, somnolence)    HEENT: (ear pain or discharge, hearing loss, ear ringing, sinus pressure, nosebleed, nasal discharge, sore throat, oral sores, tooth pain, bleeding gums, hoarse voice, neck pain)      Cardiovascular: (HTN, chest pain, elevated cholesterol/lipids, palpitations, leg swelling, leg pain with walking)    Respiratory: (cough, wheezing, snoring, SOB with activity (dyspnea), SOB while lying flat (orthopnea), awakening with severe SOB (paroxysmal nocturnal dyspnea))    Gastrointestinal: (NVD, constipation, abdominal pain, bright red stools, black tarry stools, stool incontinence)     Genitourinary:  (pelvic pain, burning or frequency of urination, urinary urgency, blood in urine incomplete bladder emptying, urinary incontinence, STD; MEN: testicular pain or swelling, erectile dysfunction; WOMEN: LMP, heavy menstrual bleeding (menorrhagia), irregular periods, postmenopausal bleeding, menstrual pain (dymenorrhea, vaginal discharge)    Musculoskeletal: (bone pain/fracture, joint pain or swelling, musle

## 2025-02-24 NOTE — TELEPHONE ENCOUNTER
Pharmacy sent a request to refill pt's medication       Last office visit : 10/3/2024 NINI DILLARD  Next office visit : 2/24/2025 S Oscar GARCIA    Requested Prescriptions     Pending Prescriptions Disp Refills    venlafaxine (EFFEXOR XR) 75 MG extended release capsule 90 capsule 0     Sig: TAKE 1 CAPSULE BY MOUTH ONCE DAILY WITH  ONE  150MG  CAPSULE  FOR  225MG  DOSE    venlafaxine (EFFEXOR XR) 150 MG extended release capsule 90 capsule 0     Sig: TAKE 1 CAPSULE BY MOUTH ONCE DAILY WITH  ONE  75MG  CAPSULE  FOR  225MG  DOSE    amitriptyline (ELAVIL) 25 MG tablet 90 tablet 0     Sig: Take 1 tablet by mouth nightly            Shameca Michelle          10/3/2024                                         Progress Note     Jane Randall 1980                                 Chief Complaint   Patient presents with    Follow-up            Subjective:    Patient is a 44 y.o. female diagnosed with MDD, CANDE, insomnia and presents today for follow-up.  Last seen in clinic on 8/28/24  and prior records were reviewed.     Seen in office.  Calm and cooperative.  Affect is brighter today.  Currently taking Effexor, buspirone, amitriptyline.  Reports compliance with medications, denies any negative unwanted side effects.    Her family ended on 9/30/2024, went to work the next day, \"and they started their crap again so I quit, and I am so glad I did.\"  We processed her feelings, supportive psychotherapy provided.  She has already been applying for a few other positions.  She is already letting her insurance lapse as she needed to be able to apply for discount program for her psoriasis medication that would not be covered under her commercial insurance at her job.  She reports \"scary because of been at that job for 16 years, but as better as I feel after quitting I feel I definitely made the right decision\".  She reports she has been sleeping well, appetite is good.  She is in therapy now.  Her father has been improving since his heart

## 2025-04-22 ENCOUNTER — TELEPHONE (OUTPATIENT)
Dept: PSYCHIATRY | Age: 45
End: 2025-04-22

## 2025-04-22 NOTE — TELEPHONE ENCOUNTER
Called patient to remind them of their appointment     -Pt confirmed  Reminded patient to complete their visit pre-check/digital registration in [employer].    Electronically signed by Licha Pulido MA on 4/22/2025 at 3:57 PM

## 2025-04-23 ENCOUNTER — OFFICE VISIT (OUTPATIENT)
Dept: PSYCHIATRY | Age: 45
End: 2025-04-23

## 2025-04-23 VITALS
WEIGHT: 229.9 LBS | DIASTOLIC BLOOD PRESSURE: 77 MMHG | TEMPERATURE: 97.2 F | HEART RATE: 88 BPM | HEIGHT: 64 IN | OXYGEN SATURATION: 97 % | BODY MASS INDEX: 39.25 KG/M2 | SYSTOLIC BLOOD PRESSURE: 121 MMHG

## 2025-04-23 DIAGNOSIS — F41.1 GENERALIZED ANXIETY DISORDER: ICD-10-CM

## 2025-04-23 DIAGNOSIS — F33.0 MILD EPISODE OF RECURRENT MAJOR DEPRESSIVE DISORDER: Primary | ICD-10-CM

## 2025-04-23 DIAGNOSIS — G47.00 INSOMNIA, UNSPECIFIED TYPE: ICD-10-CM

## 2025-04-23 PROCEDURE — 99215 OFFICE O/P EST HI 40 MIN: CPT | Performed by: PSYCHIATRY & NEUROLOGY

## 2025-04-23 ASSESSMENT — PATIENT HEALTH QUESTIONNAIRE - PHQ9
4. FEELING TIRED OR HAVING LITTLE ENERGY: SEVERAL DAYS
10. IF YOU CHECKED OFF ANY PROBLEMS, HOW DIFFICULT HAVE THESE PROBLEMS MADE IT FOR YOU TO DO YOUR WORK, TAKE CARE OF THINGS AT HOME, OR GET ALONG WITH OTHER PEOPLE: SOMEWHAT DIFFICULT
9. THOUGHTS THAT YOU WOULD BE BETTER OFF DEAD, OR OF HURTING YOURSELF: NOT AT ALL
SUM OF ALL RESPONSES TO PHQ QUESTIONS 1-9: 5
1. LITTLE INTEREST OR PLEASURE IN DOING THINGS: SEVERAL DAYS
SUM OF ALL RESPONSES TO PHQ QUESTIONS 1-9: 5
SUM OF ALL RESPONSES TO PHQ QUESTIONS 1-9: 5
5. POOR APPETITE OR OVEREATING: SEVERAL DAYS
SUM OF ALL RESPONSES TO PHQ QUESTIONS 1-9: 5
8. MOVING OR SPEAKING SO SLOWLY THAT OTHER PEOPLE COULD HAVE NOTICED. OR THE OPPOSITE, BEING SO FIGETY OR RESTLESS THAT YOU HAVE BEEN MOVING AROUND A LOT MORE THAN USUAL: NOT AT ALL
3. TROUBLE FALLING OR STAYING ASLEEP: NOT AT ALL
7. TROUBLE CONCENTRATING ON THINGS, SUCH AS READING THE NEWSPAPER OR WATCHING TELEVISION: SEVERAL DAYS
6. FEELING BAD ABOUT YOURSELF - OR THAT YOU ARE A FAILURE OR HAVE LET YOURSELF OR YOUR FAMILY DOWN: NOT AT ALL
2. FEELING DOWN, DEPRESSED OR HOPELESS: SEVERAL DAYS

## 2025-04-23 NOTE — PROGRESS NOTES
4/23/2025 11:18 AM   Progress Note          Jane Hamptonn 1980      Chief Complaint   Patient presents with    Depression    Anxiety    Insomnia         Subjective:    44 y.o. white female diagnosed with MDD, CANDE, insomnia, psoriatic arthritis, VINOD - not on CPAP, presents today for follow-up. On Effexor, Clonidine, melatonin. Discontinued Elavil and BuSpar. Added hydroxyzine.  No side effects. She had COVID and flu recently.     Pt is calm and cooperative. Brighter affect. Smiled.  Mood and sleep are better.  She got a new job which she likes.  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  Some anxiety. 2 kids - son will be graduating [employer]. Daughter turned 21.   Sleep is poor. She is coping well. In therapy.         Objective:      /77   Pulse 88   Temp 97.2 °F (36.2 °C)   Ht 1.626 m (5' 4\")   Wt 104.3 kg (229 lb 14.4 oz)   SpO2 97%   BMI 39.46 kg/m²       Review of Systems - 14 point review:  Negative except for    Constitutional: (fevers, chills, night sweats, wt loss/gain, change in appetite, fatigue, somnolence)    HEENT: (ear pain or discharge, hearing loss, ear ringing, sinus pressure, nosebleed, nasal discharge, sore throat, oral sores, tooth pain, bleeding gums, hoarse voice, neck pain)      Cardiovascular: (HTN, chest pain, elevated cholesterol/lipids, palpitations, leg swelling, leg pain with walking)    Respiratory: (cough, wheezing, snoring, SOB with activity (dyspnea), SOB while lying flat (orthopnea), awakening with severe SOB (paroxysmal nocturnal dyspnea))    Gastrointestinal: (NVD, constipation, abdominal pain, bright red stools, black tarry stools, stool incontinence)     Genitourinary:  (pelvic pain, burning or frequency of urination, urinary urgency, blood in urine incomplete bladder emptying, urinary incontinence, STD; MEN: testicular pain or swelling, erectile dysfunction; WOMEN: LMP, heavy menstrual bleeding (menorrhagia), irregular periods, postmenopausal bleeding, menstrual pain

## 2025-05-20 DIAGNOSIS — F33.0 MILD EPISODE OF RECURRENT MAJOR DEPRESSIVE DISORDER: ICD-10-CM

## 2025-05-20 DIAGNOSIS — F41.1 GENERALIZED ANXIETY DISORDER: ICD-10-CM

## 2025-05-21 RX ORDER — CLONIDINE HYDROCHLORIDE 0.1 MG/1
TABLET ORAL
Qty: 60 TABLET | Refills: 3 | Status: SHIPPED | OUTPATIENT
Start: 2025-05-21

## 2025-05-21 RX ORDER — VENLAFAXINE HYDROCHLORIDE 75 MG/1
CAPSULE, EXTENDED RELEASE ORAL
Qty: 90 CAPSULE | Refills: 3 | Status: SHIPPED | OUTPATIENT
Start: 2025-05-21

## 2025-05-21 RX ORDER — VENLAFAXINE HYDROCHLORIDE 150 MG/1
CAPSULE, EXTENDED RELEASE ORAL
Qty: 90 CAPSULE | Refills: 3 | Status: SHIPPED | OUTPATIENT
Start: 2025-05-21

## 2025-05-21 NOTE — TELEPHONE ENCOUNTER
Pharmacy sent a request to refill pt's medication       Last office visit : 4/23/2025 S Oscar GARCIA  Next office visit : 8/27/2025 S Oscar GARCIA    Requested Prescriptions     Pending Prescriptions Disp Refills    cloNIDine (CATAPRES) 0.1 MG tablet [Pharmacy Med Name: cloNIDine HCl 0.1 MG Oral Tablet] 180 tablet 0     Sig: TAKE 1 TABLET BY MOUTH TWICE DAILY IN THE MORNING AND AT NOON    venlafaxine (EFFEXOR XR) 150 MG extended release capsule [Pharmacy Med Name: Venlafaxine HCl  MG Oral Capsule Extended Release 24 Hour] 90 capsule 0     Sig: TAKE 1 CAPSULE BY MOUTH ONCE DAILY WITH  75  MG  CAPSULE  FOR  225  MG  DOSE    venlafaxine (EFFEXOR XR) 75 MG extended release capsule [Pharmacy Med Name: Venlafaxine HCl ER 75 MG Oral Capsule Extended Release 24 Hour] 90 capsule 0     Sig: TAKE 1 CAPSULE BY MOUTH ONCE DAILY WITH  150  MG  CAPSULE  FOR  225  MG  DOSE            Shameca Dariens        4/23/2025 11:18 AM                           Progress Note              Jane Randall 1980                                 Chief Complaint   Patient presents with    Depression    Anxiety    Insomnia            Subjective:    44 y.o. white female diagnosed with MDD, CANDE, insomnia, psoriatic arthritis, VINOD - not on CPAP, presents today for follow-up. On Effexor, Clonidine, melatonin. Discontinued Elavil and BuSpar. Added hydroxyzine.  No side effects. She had COVID and flu recently.      Pt is calm and cooperative. Brighter affect. Smiled.  Mood and sleep are better.  She got a new job which she likes.  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  Some anxiety. 2 kids - son will be graduating . Daughter turned 21.   Sleep is poor. She is coping well. In therapy.            Objective:       /77   Pulse 88   Temp 97.2 °F (36.2 °C)   Ht 1.626 m (5' 4\")   Wt 104.3 kg (229 lb 14.4 oz)   SpO2 97%   BMI 39.46 kg/m²         Review of Systems - 14 point review:  Negative except for     Constitutional: (fevers, chills, night

## 2025-06-23 ENCOUNTER — OFFICE VISIT (OUTPATIENT)
Dept: CARDIOLOGY | Facility: CLINIC | Age: 45
End: 2025-06-23
Payer: COMMERCIAL

## 2025-06-23 VITALS
SYSTOLIC BLOOD PRESSURE: 100 MMHG | DIASTOLIC BLOOD PRESSURE: 60 MMHG | HEART RATE: 84 BPM | WEIGHT: 230 LBS | HEIGHT: 64 IN | BODY MASS INDEX: 39.27 KG/M2

## 2025-06-23 DIAGNOSIS — I25.10 CORONARY ARTERY DISEASE INVOLVING NATIVE CORONARY ARTERY OF NATIVE HEART WITHOUT ANGINA PECTORIS: Primary | Chronic | ICD-10-CM

## 2025-06-23 DIAGNOSIS — Z72.0 TOBACCO ABUSE: Chronic | ICD-10-CM

## 2025-06-23 DIAGNOSIS — E66.812 CLASS 2 OBESITY WITH ALVEOLAR HYPOVENTILATION, SERIOUS COMORBIDITY, AND BODY MASS INDEX (BMI) OF 36.0 TO 36.9 IN ADULT: ICD-10-CM

## 2025-06-23 DIAGNOSIS — E66.2 CLASS 2 OBESITY WITH ALVEOLAR HYPOVENTILATION, SERIOUS COMORBIDITY, AND BODY MASS INDEX (BMI) OF 36.0 TO 36.9 IN ADULT: ICD-10-CM

## 2025-06-23 DIAGNOSIS — E78.2 MIXED HYPERLIPIDEMIA: Chronic | ICD-10-CM

## 2025-06-23 PROCEDURE — 93000 ELECTROCARDIOGRAM COMPLETE: CPT | Performed by: NURSE PRACTITIONER

## 2025-06-23 PROCEDURE — 99214 OFFICE O/P EST MOD 30 MIN: CPT | Performed by: NURSE PRACTITIONER

## 2025-06-23 RX ORDER — HYDROXYZINE HYDROCHLORIDE 25 MG/1
25 TABLET, FILM COATED ORAL 3 TIMES DAILY PRN
COMMUNITY
Start: 2025-05-22

## 2025-06-23 RX ORDER — SELENIUM SULFIDE 2.5 MG/100ML
LOTION TOPICAL
COMMUNITY
Start: 2025-05-22

## 2025-06-23 RX ORDER — CLOBETASOL PROPIONATE 0.5 MG/ML
SOLUTION TOPICAL
COMMUNITY
Start: 2025-05-22

## 2025-06-23 NOTE — PROGRESS NOTES
Subjective:     Encounter Date: 06/23/2025      Patient ID: Leidy Lerma is a 44 y.o. female with known coronary artery disease, prior RCA PCI in 2017 with AMI presentation, hyperlipidemia, tobacco abuse, obesity who presents today for routine follow up.     Chief Complaint: CAD Follow Up  Coronary Artery Disease  Presents for follow-up visit. Pertinent negatives include no chest pain, chest pressure, chest tightness, dizziness, leg swelling, palpitations or shortness of breath. The symptoms have been stable. Compliance with diet is good. Compliance with exercise is good. Compliance with medications is variable.     Ms. Lerma presents today for routine follow up. She reports to be feeling well. She has no cardiac complaints today. She reports compliance with her home medicines. She sees her PCP regularly.  She denies any chest pain, chest pressure, or tightness. She has no shortness of breath, dizziness, lightheadedness, palpitations, or fatigue.  She continues to smoke cigarettes.  She smokes approximately 1 pack/day.  Overall she has felt well without any complaints.        The following portions of the patient's history were reviewed and updated as appropriate: allergies, current medications, past family history, past medical history, past social history, and past surgical history.    Allergies   Allergen Reactions    Penicillins Hives       Current Outpatient Medications:     aspirin 81 MG EC tablet, Take 1 tablet by mouth Daily., Disp: 30 tablet, Rfl: 11    atorvastatin (LIPITOR) 80 MG tablet, Take 1 tablet by mouth every night at bedtime., Disp: 90 tablet, Rfl: 3    clobetasol (TEMOVATE) 0.05 % external solution, APPLY SOLUTION TOPICALLY TO AFFECTED AREA OF SCALP TWICE DAILY WHEN & WHERE PSORIASIS IS FLARED, Disp: , Rfl:     cloNIDine (CATAPRES) 0.1 MG tablet, Take 1 tablet by mouth Every Night., Disp: , Rfl:     hydrOXYzine (ATARAX) 25 MG tablet, Take 1 tablet by mouth 3 (Three) Times a Day As Needed. for  anxiety, Disp: , Rfl:     metoprolol tartrate (LOPRESSOR) 25 MG tablet, Take 1 tablet by mouth Every 12 (Twelve) Hours., Disp: 180 tablet, Rfl: 3    nitroglycerin (NITROSTAT) 0.4 MG SL tablet, Place 1 tablet under the tongue Every 5 (Five) Minutes As Needed for Chest Pain. Take no more than 3 doses in 15 minutes., Disp: 30 tablet, Rfl: 12    selenium sulfide (SELSUN) 2.5 % lotion, APPLY ONCE DAILY TO THE SKIN, FROM THE NECK TO THE WAIST. LEAVE ON FOR 10 MINUTES. THEN RINSE OFF IN THE SHOWER. REPEAT DAILY FOR 5 DAYS AT THE BEGINNING OF EVERY MONTH, Disp: , Rfl:     VENLAFAXINE HCL PO, Take 150 mg by mouth Daily., Disp: , Rfl:     amitriptyline (ELAVIL) 25 MG tablet, Take 1 tablet by mouth Every Night., Disp: , Rfl:     busPIRone (BUSPAR) 30 MG tablet, Take 1 tablet by mouth 2 (Two) Times a Day., Disp: , Rfl:     Review of Systems   Constitutional: Negative for diaphoresis, fever and malaise/fatigue.   Cardiovascular:  Negative for chest pain, dyspnea on exertion, irregular heartbeat, leg swelling, near-syncope, palpitations, paroxysmal nocturnal dyspnea and syncope.   Respiratory:  Negative for chest tightness, cough, shortness of breath and wheezing.    Hematologic/Lymphatic: Negative for bleeding problem.   Gastrointestinal:  Negative for nausea and vomiting.   Neurological:  Negative for dizziness, focal weakness, headaches, light-headedness and weakness.   Psychiatric/Behavioral:  Negative for altered mental status.          ECG 12 Lead    Date/Time: 6/23/2025 11:52 PM  Performed by: Magy Richard APRN    Authorized by: Magy Richard APRN  Comparison: compared with previous ECG from 6/17/2024  Similar to previous ECG  Rhythm: sinus rhythm  Rate: normal  BPM: 84  Conduction: conduction normal  Q waves: II, III and aVF    ST Segments: ST segments normal  T Waves: T waves normal  QRS axis: normal    Clinical impression: abnormal EKG           Objective:     Vitals reviewed.   Constitutional:       General: Not  "in acute distress.     Appearance: Normal and healthy appearance. Well-developed, well-groomed and not in distress. Obese.   HENT:      Head: Normocephalic and atraumatic.   Pulmonary:      Effort: Pulmonary effort is normal.      Breath sounds: Normal breath sounds. No wheezing. No rhonchi. No rales.   Cardiovascular:      Normal rate. Regular rhythm.      Murmurs: There is no murmur.      No gallop.  No rub.   Edema:     Peripheral edema absent.   Musculoskeletal:      Cervical back: Normal range of motion and neck supple. Skin:     General: Skin is warm and dry.   Neurological:      Mental Status: Alert, oriented to person, place, and time and oriented to person, place and time.   Psychiatric:         Attention and Perception: Attention normal.         Mood and Affect: Mood normal.         Speech: Speech normal.         Behavior: Behavior normal. Behavior is cooperative.         Thought Content: Thought content normal.         Cognition and Memory: Cognition normal.         Judgment: Judgment normal.       /60   Pulse 84   Ht 162.6 cm (64\")   Wt 104 kg (230 lb)   BMI 39.48 kg/m²       Lab Review:     Cardiac Catheterization October 2017 ([facility])  Impression:  1. Acute inferior ST segment elevation myocardial infarction due to right coronary artery occlusion/stenosis, now status post successful percutaneous coronary intervention to the mid right coronary artery with Xience drug eluting stent.  2.  Mild disease in the left anterior descending artery and left circumflex.    Results for orders placed during the hospital encounter of 10/05/17    Adult Transthoracic Echo Complete W/ Cont if Necessary Per Protocol    Interpretation Summary  · Left ventricular systolic function is normal. Estimated EF appears to be in the range of 56 - 60%.  · The following left ventricular wall segments are hypokinetic: basal inferior.  · Left ventricular wall thickness is consistent with borderline concentric " hypertrophy.  · Trace tricuspid valve regurgitation is present. Estimated right ventricular systolic pressure from tricuspid regurgitation is normal (<35 mmHg).        Assessment:          Diagnosis Plan   1. Coronary artery disease involving native coronary artery of native heart without angina pectoris        2. Mixed hyperlipidemia        3. Tobacco abuse        4. Class 2 obesity with alveolar hypoventilation, serious comorbidity, and body mass index (BMI) of 36.0 to 36.9 in adult             Plan:       CAD: Stable.  Feels well.  Denies shortness of breath or chest pain.  History of acute myocardial infarction in 2017.  Continues on appropriate medical therapy with aspirin, statin, beta-blocker.  No changes today.    HLD: Goal LDL less than 70 given known coronary artery disease.  Currently managed on statin therapy.  Her labs are drawn through his primary care office.    Tobacco Abuse: Daily Smoker, 1 pack per day. smoking cessation advised.     Obesity: BMI 39.48 - ongoing recommendations of diet, exercise, and weight loss.       Continue current medications.  Labs with PCP  LDL goal < 70.   Follow up in 1 year, sooner if needed.       I attest that all portions of this note have been reviewed and updated to reflect the patient's current status.

## 2025-08-21 RX ORDER — METOPROLOL TARTRATE 25 MG/1
25 TABLET, FILM COATED ORAL EVERY 12 HOURS
Qty: 180 TABLET | Refills: 3 | Status: SHIPPED | OUTPATIENT
Start: 2025-08-21

## 2025-08-26 ENCOUNTER — TELEPHONE (OUTPATIENT)
Dept: PSYCHIATRY | Age: 45
End: 2025-08-26

## 2025-08-27 ENCOUNTER — OFFICE VISIT (OUTPATIENT)
Dept: PSYCHIATRY | Age: 45
End: 2025-08-27
Payer: COMMERCIAL

## 2025-08-27 VITALS
HEART RATE: 84 BPM | WEIGHT: 231.2 LBS | TEMPERATURE: 97.7 F | BODY MASS INDEX: 39.47 KG/M2 | OXYGEN SATURATION: 96 % | SYSTOLIC BLOOD PRESSURE: 112 MMHG | DIASTOLIC BLOOD PRESSURE: 80 MMHG | HEIGHT: 64 IN

## 2025-08-27 DIAGNOSIS — F41.1 GENERALIZED ANXIETY DISORDER: ICD-10-CM

## 2025-08-27 DIAGNOSIS — G47.00 INSOMNIA, UNSPECIFIED TYPE: ICD-10-CM

## 2025-08-27 DIAGNOSIS — F33.0 MILD EPISODE OF RECURRENT MAJOR DEPRESSIVE DISORDER: Primary | ICD-10-CM

## 2025-08-27 PROCEDURE — 99214 OFFICE O/P EST MOD 30 MIN: CPT | Performed by: PSYCHIATRY & NEUROLOGY

## 2025-08-27 ASSESSMENT — PATIENT HEALTH QUESTIONNAIRE - PHQ9
SUM OF ALL RESPONSES TO PHQ QUESTIONS 1-9: 7
2. FEELING DOWN, DEPRESSED OR HOPELESS: SEVERAL DAYS
6. FEELING BAD ABOUT YOURSELF - OR THAT YOU ARE A FAILURE OR HAVE LET YOURSELF OR YOUR FAMILY DOWN: NOT AT ALL
5. POOR APPETITE OR OVEREATING: SEVERAL DAYS
1. LITTLE INTEREST OR PLEASURE IN DOING THINGS: SEVERAL DAYS
4. FEELING TIRED OR HAVING LITTLE ENERGY: SEVERAL DAYS
3. TROUBLE FALLING OR STAYING ASLEEP: MORE THAN HALF THE DAYS
9. THOUGHTS THAT YOU WOULD BE BETTER OFF DEAD, OR OF HURTING YOURSELF: NOT AT ALL
10. IF YOU CHECKED OFF ANY PROBLEMS, HOW DIFFICULT HAVE THESE PROBLEMS MADE IT FOR YOU TO DO YOUR WORK, TAKE CARE OF THINGS AT HOME, OR GET ALONG WITH OTHER PEOPLE: SOMEWHAT DIFFICULT
SUM OF ALL RESPONSES TO PHQ QUESTIONS 1-9: 7
SUM OF ALL RESPONSES TO PHQ QUESTIONS 1-9: 7
8. MOVING OR SPEAKING SO SLOWLY THAT OTHER PEOPLE COULD HAVE NOTICED. OR THE OPPOSITE, BEING SO FIGETY OR RESTLESS THAT YOU HAVE BEEN MOVING AROUND A LOT MORE THAN USUAL: NOT AT ALL
7. TROUBLE CONCENTRATING ON THINGS, SUCH AS READING THE NEWSPAPER OR WATCHING TELEVISION: SEVERAL DAYS
SUM OF ALL RESPONSES TO PHQ QUESTIONS 1-9: 7

## 2025-09-02 RX ORDER — HYDROXYZINE HYDROCHLORIDE 25 MG/1
25 TABLET, FILM COATED ORAL 3 TIMES DAILY PRN
Qty: 60 TABLET | Refills: 2 | Status: SHIPPED | OUTPATIENT
Start: 2025-09-02

## (undated) DEVICE — GW PTCA ASAHI PROWATER .014 180CM

## (undated) DEVICE — GW STARTER FXD CORE J .035 3X150CM 3MM

## (undated) DEVICE — KT VLV HEMO MAP ACC PLS LG/BORE MTL/INTRO

## (undated) DEVICE — INF TL MULIPACK FR6: Brand: INFINITI

## (undated) DEVICE — PK CATH CARD 30

## (undated) DEVICE — SOLIDIFIER LIQUI LOC PLUS 2000CC

## (undated) DEVICE — MODEL AT P65, P/N 701554-001KIT CONTENTS: HAND CONTROLLER, 3-WAY HIGH-PRESSURE STOPCOCK WITH ROTATING END AND PREMIUM HIGH-PRESSURE TUBING: Brand: ANGIOTOUCH® KIT

## (undated) DEVICE — SOL IRR NACL 0.9PCT BT 1000ML

## (undated) DEVICE — NC TREK CORONARY DILATATION CATHETER 4.0 MM X 12 MM / RAPID-EXCHANGE: Brand: NC TREK

## (undated) DEVICE — ELECTRD PAD DEFIB A/

## (undated) DEVICE — 6F .070 JR 4 100CM: Brand: CORDIS

## (undated) DEVICE — INFLATION DEVICE: Brand: ENCORE™ 26

## (undated) DEVICE — MODEL BT2000 P/N 700287-012KIT CONTENTS: MANIFOLD WITH SALINE AND CONTRAST PORTS, SALINE TUBING WITH SPIKE AND HAND SYRINGE, TRANSDUCER: Brand: BT2000 AUTOMATED MANIFOLD KIT

## (undated) DEVICE — MINI TREK CORONARY DILATATION CATHETER 2.0 MM X 12 MM / RAPID-EXCHANGE: Brand: MINI TREK

## (undated) DEVICE — DEV TORQ GW HOT/PINK

## (undated) DEVICE — CANN CO2/O2 NASL A/

## (undated) DEVICE — PINNACLE INTRODUCER SHEATH: Brand: PINNACLE